# Patient Record
Sex: FEMALE | Race: WHITE | NOT HISPANIC OR LATINO | Employment: OTHER | URBAN - METROPOLITAN AREA
[De-identification: names, ages, dates, MRNs, and addresses within clinical notes are randomized per-mention and may not be internally consistent; named-entity substitution may affect disease eponyms.]

---

## 2020-03-14 ENCOUNTER — NURSE TRIAGE (OUTPATIENT)
Dept: ADMINISTRATIVE | Facility: CLINIC | Age: 40
End: 2020-03-14

## 2020-03-14 NOTE — TELEPHONE ENCOUNTER
Patient's fiance reports throat itching and burning, cough and test tightness and a fever. Patient's fiance wasn't present with the patient and was advised to have th patient with her when calling and also was advised to download anywhere care since the patient's fiance is concerned about patient having COVID-19. The patient's fiance verbalized understanding and was advised to call back with questions, concerns or worsening symptoms. The patient's fiance verbalized understanding.     Reason for Disposition   Question about upcoming scheduled test, no triage required and triager able to answer question    Additional Information   Negative: [1] Caller is not with the adult (patient) AND [2] reporting urgent symptoms   Negative: Lab result questions   Negative: Medication questions   Negative: Caller can't be reached by phone   Negative: Caller has already spoken to PCP or another triager   Negative: RN needs further essential information from caller in order to complete triage   Negative: Requesting regular office appointment   Negative: [1] Caller requesting NON-URGENT health information AND [2] PCP's office is the best resource   Negative: Health Information question, no triage required and triager able to answer question   Negative: General information question, no triage required and triager able to answer question    Protocols used: INFORMATION ONLY CALL-A-

## 2020-04-29 PROBLEM — Z12.11 SCREENING FOR COLORECTAL CANCER: Status: ACTIVE | Noted: 2020-04-29

## 2020-04-29 PROBLEM — Z12.12 SCREENING FOR COLORECTAL CANCER: Status: ACTIVE | Noted: 2020-04-29

## 2020-05-12 ENCOUNTER — NURSE TRIAGE (OUTPATIENT)
Dept: ADMINISTRATIVE | Facility: CLINIC | Age: 40
End: 2020-05-12

## 2020-05-12 NOTE — TELEPHONE ENCOUNTER
Asymptomatic per pt      Reason for Disposition   General information question, no triage required and triager able to answer question    Additional Information   Negative: [1] Caller is not with the adult (patient) AND [2] reporting urgent symptoms   Negative: Lab result questions   Negative: Medication questions   Negative: Caller can't be reached by phone   Negative: Caller has already spoken to PCP or another triager   Negative: RN needs further essential information from caller in order to complete triage   Negative: Requesting regular office appointment   Negative: [1] Caller requesting NON-URGENT health information AND [2] PCP's office is the best resource   Negative: Health Information question, no triage required and triager able to answer question    Protocols used: INFORMATION ONLY CALL-A-

## 2020-07-09 ENCOUNTER — HISTORICAL (OUTPATIENT)
Dept: ADMINISTRATIVE | Facility: HOSPITAL | Age: 40
End: 2020-07-09

## 2020-07-09 LAB
ALBUMIN SERPL BCP-MCNC: 3.9 G/DL (ref 3.5–5)
ALBUMIN/GLOB SERPL ELPH: 1.1 {RATIO} (ref 1.5–2.2)
ALP SERPL-CCNC: 69 U/L (ref 45–117)
ALT SERPL W P-5'-P-CCNC: 71 U/L (ref 13–56)
ANION GAP SERPL CALC-SCNC: 4.7 MEQ/L (ref 10–20)
AST SERPL-CCNC: 86 U/L (ref 15–37)
BASOPHILS NFR BLD: 0 10 (ref 0–0.1)
BASOPHILS NFR BLD: 0.4 % (ref 0–1.5)
BILIRUB SERPL-MCNC: 0.41 MG/DL (ref 0.2–1)
BUN SERPL-MCNC: 7 MG/DL (ref 7–18)
CALCIUM SERPL-MCNC: 9 MG/DL (ref 8.5–10.1)
CHLORIDE SERPL-SCNC: 108 MMOL/L (ref 98–107)
CO2 SERPL-SCNC: 29 MMOL/L (ref 22–32)
CREAT SERPL-MCNC: 1.09 MG/DL (ref 0.55–1.02)
EGFR: 59 ML/MIN/1.73M
EOSINOPHIL NFR BLD: 0.2 10 (ref 0–0.7)
EOSINOPHIL NFR BLD: 2.5 % (ref 0–7)
ERYTHROCYTE [DISTWIDTH] IN BLOOD BY AUTOMATED COUNT: 14.3 % (ref 11.5–14.5)
GLOBULIN: 3.7 G/DL (ref 2.3–3.5)
GLUCOSE SERPL-MCNC: 100 MG/DL (ref 70–99)
GRAN #: 4.16 10 (ref 2–7.5)
GRAN%: 0.3 %
GRAN%: 61.7 % (ref 50–80)
HCT VFR BLD AUTO: 42.3 % (ref 37.7–47.9)
HGB BLD-MCNC: 14.9 G/DL (ref 12.2–16.2)
IMMATURE GRANULOCYTES #: 0.02 10
IPF: 4.8
LIPASE SERPL-CCNC: 48 U/L (ref 73–393)
LYMPH #: 1.8 10 (ref 1–3.5)
LYMPH%: 26.9 % (ref 12–50)
MCH RBC QN AUTO: 34 PG (ref 27–31)
MCHC RBC AUTO-ENTMCNC: 35.2 G% (ref 32–35)
MCV RBC AUTO: 96.6 FL (ref 80–97)
MONO #: 0.6 10 (ref 0–0.8)
MONO%: 8.2 % (ref 0–12)
OSMOC: 274 MOSM/KG (ref 275–295)
PMV BLD AUTO: 269 10 (ref 142–424)
PMV BLD AUTO: 9.1 FL (ref 7.4–10.4)
POTASSIUM SERPL-SCNC: 3.7 MMOL/L (ref 3.5–5.1)
PROT SERPL-MCNC: 7.6 G/DL (ref 6.4–8.2)
RBC # BLD AUTO: 4.38 M/UL (ref 4.04–5.48)
SODIUM BLD-SCNC: 138 MMOL/L (ref 136–145)
WBC # BLD AUTO: 6.7 10 (ref 4–10.2)

## 2020-07-13 PROBLEM — K50.90 EXACERBATION OF CROHN'S DISEASE WITHOUT COMPLICATION: Status: ACTIVE | Noted: 2020-07-13

## 2020-07-13 PROBLEM — F32.A ANXIETY AND DEPRESSION: Status: ACTIVE | Noted: 2020-07-13

## 2020-07-13 PROBLEM — E78.5 HLD (HYPERLIPIDEMIA): Status: ACTIVE | Noted: 2020-07-13

## 2020-07-13 PROBLEM — K50.90 CROHN DISEASE: Status: ACTIVE | Noted: 2020-07-13

## 2020-07-13 PROBLEM — F41.9 ANXIETY AND DEPRESSION: Status: ACTIVE | Noted: 2020-07-13

## 2020-07-14 PROBLEM — N17.9 AKI (ACUTE KIDNEY INJURY): Status: ACTIVE | Noted: 2020-07-14

## 2020-07-15 PROBLEM — R63.8 INCREASED NUTRITIONAL NEEDS: Status: ACTIVE | Noted: 2020-07-15

## 2020-10-07 LAB
HPV APTIMA: POSITIVE
HPV GENOTYPE 16: NEGATIVE
HPV GENOTYPE 18,45: POSITIVE
PAP RECOMMENDATION EXT: NORMAL

## 2021-01-07 ENCOUNTER — HOSPITAL ENCOUNTER (EMERGENCY)
Facility: HOSPITAL | Age: 41
Discharge: HOME | End: 2021-01-07
Attending: EMERGENCY MEDICINE
Payer: MEDICAID

## 2021-01-07 VITALS
DIASTOLIC BLOOD PRESSURE: 77 MMHG | BODY MASS INDEX: 37.95 KG/M2 | HEART RATE: 80 BPM | HEIGHT: 60 IN | OXYGEN SATURATION: 99 % | TEMPERATURE: 98 F | WEIGHT: 193.31 LBS | SYSTOLIC BLOOD PRESSURE: 133 MMHG | RESPIRATION RATE: 14 BRPM

## 2021-01-07 DIAGNOSIS — K04.7 DENTAL ABSCESS: Primary | ICD-10-CM

## 2021-01-07 PROCEDURE — 99284 EMERGENCY DEPT VISIT MOD MDM: CPT

## 2021-01-07 PROCEDURE — 25000003 PHARM REV CODE 250: Performed by: EMERGENCY MEDICINE

## 2021-01-07 RX ORDER — HYDROCODONE BITARTRATE AND ACETAMINOPHEN 7.5; 325 MG/15ML; MG/15ML
5 SOLUTION ORAL
Status: COMPLETED | OUTPATIENT
Start: 2021-01-07 | End: 2021-01-07

## 2021-01-07 RX ORDER — AMOXICILLIN AND CLAVULANATE POTASSIUM 400; 57 MG/5ML; MG/5ML
800 POWDER, FOR SUSPENSION ORAL
Status: COMPLETED | OUTPATIENT
Start: 2021-01-07 | End: 2021-01-07

## 2021-01-07 RX ORDER — AMOXICILLIN AND CLAVULANATE POTASSIUM 400; 57 MG/5ML; MG/5ML
800 POWDER, FOR SUSPENSION ORAL 2 TIMES DAILY
Qty: 200 ML | Refills: 0 | Status: SHIPPED | OUTPATIENT
Start: 2021-01-07 | End: 2021-01-17

## 2021-01-07 RX ORDER — TRAMADOL HYDROCHLORIDE 50 MG/1
50 TABLET ORAL EVERY 6 HOURS PRN
Qty: 12 TABLET | Refills: 0 | Status: SHIPPED | OUTPATIENT
Start: 2021-01-07 | End: 2023-02-16

## 2021-01-07 RX ADMIN — HYDROCODONE BITARTRATE AND ACETAMINOPHEN 5 ML: 7.5; 325 SOLUTION ORAL at 09:01

## 2021-01-07 RX ADMIN — AMOXICILLIN AND CLAVULANATE POTASSIUM 800 MG: 400; 57 POWDER, FOR SUSPENSION ORAL at 09:01

## 2021-04-13 DIAGNOSIS — Z12.31 ENCOUNTER FOR SCREENING MAMMOGRAM FOR MALIGNANT NEOPLASM OF BREAST: Primary | ICD-10-CM

## 2021-05-13 ENCOUNTER — HOSPITAL ENCOUNTER (OUTPATIENT)
Dept: RADIOLOGY | Facility: HOSPITAL | Age: 41
Discharge: HOME | End: 2021-05-13
Attending: NURSE PRACTITIONER
Payer: MEDICAID

## 2021-05-13 VITALS — WEIGHT: 206 LBS | HEIGHT: 63 IN | BODY MASS INDEX: 36.5 KG/M2

## 2021-05-13 DIAGNOSIS — Z12.31 ENCOUNTER FOR SCREENING MAMMOGRAM FOR MALIGNANT NEOPLASM OF BREAST: ICD-10-CM

## 2021-05-13 PROCEDURE — 77067 SCR MAMMO BI INCL CAD: CPT | Mod: TC

## 2021-08-08 ENCOUNTER — HOSPITAL ENCOUNTER (EMERGENCY)
Facility: HOSPITAL | Age: 41
Discharge: HOME | End: 2021-08-08
Attending: EMERGENCY MEDICINE
Payer: MEDICAID

## 2021-08-08 VITALS
TEMPERATURE: 98 F | SYSTOLIC BLOOD PRESSURE: 120 MMHG | HEIGHT: 63 IN | RESPIRATION RATE: 16 BRPM | OXYGEN SATURATION: 98 % | HEART RATE: 91 BPM | WEIGHT: 204 LBS | DIASTOLIC BLOOD PRESSURE: 75 MMHG | BODY MASS INDEX: 36.14 KG/M2

## 2021-08-08 DIAGNOSIS — U07.1 COVID-19: Primary | ICD-10-CM

## 2021-08-08 LAB
CTP QC/QA: YES
SARS-COV-2 RDRP RESP QL NAA+PROBE: POSITIVE

## 2021-08-08 PROCEDURE — U0002 COVID-19 LAB TEST NON-CDC: HCPCS | Performed by: EMERGENCY MEDICINE

## 2021-08-08 PROCEDURE — 63700000 PHARM REV CODE 250 ALT 637 W/O HCPCS: Performed by: EMERGENCY MEDICINE

## 2021-08-08 PROCEDURE — 63600175 PHARM REV CODE 636 W HCPCS: Performed by: EMERGENCY MEDICINE

## 2021-08-08 PROCEDURE — 99284 EMERGENCY DEPT VISIT MOD MDM: CPT

## 2021-08-08 RX ORDER — PREDNISONE 10 MG/1
10 TABLET ORAL DAILY
Qty: 5 TABLET | Refills: 0 | Status: SHIPPED | OUTPATIENT
Start: 2021-08-08 | End: 2021-08-13

## 2021-08-08 RX ORDER — AZITHROMYCIN 500 MG/1
500 TABLET, FILM COATED ORAL DAILY
Qty: 4 TABLET | Refills: 0 | Status: SHIPPED | OUTPATIENT
Start: 2021-08-08 | End: 2021-08-12

## 2021-08-08 RX ORDER — ALBUTEROL SULFATE 90 UG/1
2 AEROSOL, METERED RESPIRATORY (INHALATION) EVERY 6 HOURS PRN
Qty: 6.7 G | Refills: 0 | Status: SHIPPED | OUTPATIENT
Start: 2021-08-08 | End: 2023-02-16

## 2021-08-08 RX ORDER — PREDNISONE 20 MG/1
40 TABLET ORAL
Status: COMPLETED | OUTPATIENT
Start: 2021-08-08 | End: 2021-08-08

## 2021-08-08 RX ORDER — AZITHROMYCIN 250 MG/1
500 TABLET, FILM COATED ORAL
Status: COMPLETED | OUTPATIENT
Start: 2021-08-08 | End: 2021-08-08

## 2021-08-08 RX ADMIN — PREDNISONE 40 MG: 20 TABLET ORAL at 11:08

## 2021-08-08 RX ADMIN — AZITHROMYCIN 500 MG: 250 TABLET, FILM COATED ORAL at 11:08

## 2021-08-09 DIAGNOSIS — U07.1 COVID-19 VIRUS DETECTED: ICD-10-CM

## 2021-08-19 ENCOUNTER — HOSPITAL ENCOUNTER (INPATIENT)
Facility: HOSPITAL | Age: 41
LOS: 6 days | Discharge: HOME | DRG: 177 | End: 2021-08-25
Attending: EMERGENCY MEDICINE | Admitting: INTERNAL MEDICINE
Payer: MEDICAID

## 2021-08-19 DIAGNOSIS — E87.6 HYPOKALEMIA: ICD-10-CM

## 2021-08-19 DIAGNOSIS — J12.82 PNEUMONIA DUE TO COVID-19 VIRUS: ICD-10-CM

## 2021-08-19 DIAGNOSIS — K52.9 GASTROENTERITIS: ICD-10-CM

## 2021-08-19 DIAGNOSIS — N17.9 ACUTE KIDNEY INJURY: ICD-10-CM

## 2021-08-19 DIAGNOSIS — U07.1 COVID-19 VIRUS INFECTION: Primary | ICD-10-CM

## 2021-08-19 DIAGNOSIS — R06.00 DYSPNEA: ICD-10-CM

## 2021-08-19 DIAGNOSIS — U07.1 PNEUMONIA DUE TO COVID-19 VIRUS: ICD-10-CM

## 2021-08-19 LAB
ALBUMIN SERPL BCP-MCNC: 3.5 G/DL (ref 3.5–5.2)
ALP SERPL-CCNC: 77 U/L (ref 55–135)
ALT SERPL W/O P-5'-P-CCNC: 53 U/L (ref 10–44)
ANION GAP SERPL CALC-SCNC: 17 MMOL/L (ref 8–16)
AST SERPL-CCNC: 113 U/L (ref 10–40)
BASOPHILS # BLD AUTO: ABNORMAL K/UL (ref 0–0.2)
BASOPHILS NFR BLD: 0 % (ref 0–1.9)
BILIRUB SERPL-MCNC: 0.4 MG/DL (ref 0.1–1)
BUN SERPL-MCNC: 39 MG/DL (ref 6–20)
CALCIUM SERPL-MCNC: 8.6 MG/DL (ref 8.7–10.5)
CHLORIDE SERPL-SCNC: 96 MMOL/L (ref 95–110)
CO2 SERPL-SCNC: 22 MMOL/L (ref 23–29)
CREAT SERPL-MCNC: 1.7 MG/DL (ref 0.5–1.4)
DIFFERENTIAL METHOD: ABNORMAL
EOSINOPHIL # BLD AUTO: ABNORMAL K/UL (ref 0–0.5)
EOSINOPHIL NFR BLD: 0 % (ref 0–8)
ERYTHROCYTE [DISTWIDTH] IN BLOOD BY AUTOMATED COUNT: 13.2 % (ref 11.5–14.5)
EST. GFR  (AFRICAN AMERICAN): 42.3 ML/MIN/1.73 M^2
EST. GFR  (NON AFRICAN AMERICAN): 36.7 ML/MIN/1.73 M^2
GLUCOSE SERPL-MCNC: 128 MG/DL (ref 70–110)
HCT VFR BLD AUTO: 39.3 % (ref 37–48.5)
HGB BLD-MCNC: 14.7 G/DL (ref 12–16)
IMM GRANULOCYTES # BLD AUTO: ABNORMAL K/UL (ref 0–0.04)
IMM GRANULOCYTES NFR BLD AUTO: ABNORMAL % (ref 0–0.5)
LACTATE SERPL-SCNC: 2.1 MMOL/L (ref 0.5–2.2)
LYMPHOCYTES # BLD AUTO: ABNORMAL K/UL (ref 1–4.8)
LYMPHOCYTES NFR BLD: 24 % (ref 18–48)
MAGNESIUM SERPL-MCNC: 2.1 MG/DL (ref 1.6–2.6)
MCH RBC QN AUTO: 38 PG (ref 27–31)
MCHC RBC AUTO-ENTMCNC: 37.4 G/DL (ref 32–36)
MCV RBC AUTO: 102 FL (ref 82–98)
MONOCYTES # BLD AUTO: ABNORMAL K/UL (ref 0.3–1)
MONOCYTES NFR BLD: 5 % (ref 4–15)
NEUTROPHILS NFR BLD: 67 % (ref 38–73)
NEUTS BAND NFR BLD MANUAL: 4 %
NRBC BLD-RTO: 0 /100 WBC
PLATELET # BLD AUTO: 116 K/UL (ref 150–450)
PLATELET BLD QL SMEAR: ABNORMAL
PMV BLD AUTO: 10.6 FL (ref 9.2–12.9)
POTASSIUM SERPL-SCNC: 2.6 MMOL/L (ref 3.5–5.1)
PROT SERPL-MCNC: 8.3 G/DL (ref 6–8.4)
RBC # BLD AUTO: 3.87 M/UL (ref 4–5.4)
SODIUM SERPL-SCNC: 135 MMOL/L (ref 136–145)
WBC # BLD AUTO: 3.17 K/UL (ref 3.9–12.7)

## 2021-08-19 PROCEDURE — 83735 ASSAY OF MAGNESIUM: CPT | Performed by: EMERGENCY MEDICINE

## 2021-08-19 PROCEDURE — 25000242 PHARM REV CODE 250 ALT 637 W/ HCPCS: Performed by: EMERGENCY MEDICINE

## 2021-08-19 PROCEDURE — 63600175 PHARM REV CODE 636 W HCPCS: Performed by: EMERGENCY MEDICINE

## 2021-08-19 PROCEDURE — 25000003 PHARM REV CODE 250: Performed by: EMERGENCY MEDICINE

## 2021-08-19 PROCEDURE — 85027 COMPLETE CBC AUTOMATED: CPT | Performed by: EMERGENCY MEDICINE

## 2021-08-19 PROCEDURE — 99900035 HC TECH TIME PER 15 MIN (STAT)

## 2021-08-19 PROCEDURE — 99285 EMERGENCY DEPT VISIT HI MDM: CPT | Mod: 25

## 2021-08-19 PROCEDURE — 96361 HYDRATE IV INFUSION ADD-ON: CPT

## 2021-08-19 PROCEDURE — 96374 THER/PROPH/DIAG INJ IV PUSH: CPT

## 2021-08-19 PROCEDURE — 94761 N-INVAS EAR/PLS OXIMETRY MLT: CPT

## 2021-08-19 PROCEDURE — 85007 BL SMEAR W/DIFF WBC COUNT: CPT | Performed by: EMERGENCY MEDICINE

## 2021-08-19 PROCEDURE — 87040 BLOOD CULTURE FOR BACTERIA: CPT | Mod: 59 | Performed by: EMERGENCY MEDICINE

## 2021-08-19 PROCEDURE — 83605 ASSAY OF LACTIC ACID: CPT | Performed by: EMERGENCY MEDICINE

## 2021-08-19 PROCEDURE — 27000207 HC ISOLATION

## 2021-08-19 PROCEDURE — 94640 AIRWAY INHALATION TREATMENT: CPT

## 2021-08-19 PROCEDURE — 11000001 HC ACUTE MED/SURG PRIVATE ROOM

## 2021-08-19 PROCEDURE — 80053 COMPREHEN METABOLIC PANEL: CPT | Performed by: EMERGENCY MEDICINE

## 2021-08-19 RX ORDER — POTASSIUM CHLORIDE 20 MEQ/1
40 TABLET, EXTENDED RELEASE ORAL
Status: COMPLETED | OUTPATIENT
Start: 2021-08-19 | End: 2021-08-19

## 2021-08-19 RX ORDER — ACETAMINOPHEN 325 MG/1
650 TABLET ORAL EVERY 8 HOURS PRN
Status: DISCONTINUED | OUTPATIENT
Start: 2021-08-19 | End: 2021-08-25 | Stop reason: HOSPADM

## 2021-08-19 RX ORDER — SODIUM CHLORIDE 0.9 % (FLUSH) 0.9 %
10 SYRINGE (ML) INJECTION
Status: DISCONTINUED | OUTPATIENT
Start: 2021-08-19 | End: 2021-08-25 | Stop reason: HOSPADM

## 2021-08-19 RX ORDER — ONDANSETRON 2 MG/ML
4 INJECTION INTRAMUSCULAR; INTRAVENOUS EVERY 8 HOURS PRN
Status: DISCONTINUED | OUTPATIENT
Start: 2021-08-19 | End: 2021-08-25 | Stop reason: HOSPADM

## 2021-08-19 RX ORDER — DEXAMETHASONE SODIUM PHOSPHATE 4 MG/ML
6 INJECTION, SOLUTION INTRA-ARTICULAR; INTRALESIONAL; INTRAMUSCULAR; INTRAVENOUS; SOFT TISSUE EVERY 24 HOURS
Status: DISCONTINUED | OUTPATIENT
Start: 2021-08-19 | End: 2021-08-25 | Stop reason: HOSPADM

## 2021-08-19 RX ORDER — ONDANSETRON 2 MG/ML
4 INJECTION INTRAMUSCULAR; INTRAVENOUS
Status: COMPLETED | OUTPATIENT
Start: 2021-08-19 | End: 2021-08-19

## 2021-08-19 RX ORDER — TALC
6 POWDER (GRAM) TOPICAL NIGHTLY PRN
Status: DISCONTINUED | OUTPATIENT
Start: 2021-08-19 | End: 2021-08-25 | Stop reason: HOSPADM

## 2021-08-19 RX ORDER — IPRATROPIUM BROMIDE AND ALBUTEROL SULFATE 2.5; .5 MG/3ML; MG/3ML
3 SOLUTION RESPIRATORY (INHALATION) EVERY 6 HOURS PRN
Status: DISCONTINUED | OUTPATIENT
Start: 2021-08-19 | End: 2021-08-25 | Stop reason: HOSPADM

## 2021-08-19 RX ORDER — IBUPROFEN 600 MG/1
600 TABLET ORAL
Status: COMPLETED | OUTPATIENT
Start: 2021-08-19 | End: 2021-08-19

## 2021-08-19 RX ORDER — ACETAMINOPHEN 325 MG/1
650 TABLET ORAL
Status: COMPLETED | OUTPATIENT
Start: 2021-08-19 | End: 2021-08-19

## 2021-08-19 RX ORDER — SODIUM CHLORIDE AND POTASSIUM CHLORIDE 150; 900 MG/100ML; MG/100ML
INJECTION, SOLUTION INTRAVENOUS CONTINUOUS
Status: DISCONTINUED | OUTPATIENT
Start: 2021-08-19 | End: 2021-08-22

## 2021-08-19 RX ORDER — IPRATROPIUM BROMIDE AND ALBUTEROL SULFATE 2.5; .5 MG/3ML; MG/3ML
3 SOLUTION RESPIRATORY (INHALATION)
Status: COMPLETED | OUTPATIENT
Start: 2021-08-19 | End: 2021-08-19

## 2021-08-19 RX ADMIN — ONDANSETRON 4 MG: 2 INJECTION INTRAMUSCULAR; INTRAVENOUS at 08:08

## 2021-08-19 RX ADMIN — IPRATROPIUM BROMIDE AND ALBUTEROL SULFATE 3 ML: .5; 3 SOLUTION RESPIRATORY (INHALATION) at 07:08

## 2021-08-19 RX ADMIN — ACETAMINOPHEN 650 MG: 325 TABLET ORAL at 08:08

## 2021-08-19 RX ADMIN — IBUPROFEN 600 MG: 600 TABLET, FILM COATED ORAL at 09:08

## 2021-08-19 RX ADMIN — SODIUM CHLORIDE 1000 ML: 0.9 INJECTION, SOLUTION INTRAVENOUS at 07:08

## 2021-08-19 RX ADMIN — POTASSIUM CHLORIDE 40 MEQ: 1500 TABLET, EXTENDED RELEASE ORAL at 09:08

## 2021-08-20 PROBLEM — E87.6 HYPOKALEMIA: Status: ACTIVE | Noted: 2021-08-20

## 2021-08-20 PROBLEM — J12.82 PNEUMONIA DUE TO COVID-19 VIRUS: Status: ACTIVE | Noted: 2021-08-19

## 2021-08-20 PROBLEM — R79.89 ELEVATED LFTS: Status: ACTIVE | Noted: 2021-08-20

## 2021-08-20 LAB
ALBUMIN SERPL BCP-MCNC: 3 G/DL (ref 3.5–5.2)
ALP SERPL-CCNC: 68 U/L (ref 55–135)
ALT SERPL W/O P-5'-P-CCNC: 47 U/L (ref 10–44)
ANION GAP SERPL CALC-SCNC: 9 MMOL/L (ref 8–16)
AST SERPL-CCNC: 106 U/L (ref 10–40)
BASOPHILS # BLD AUTO: ABNORMAL K/UL (ref 0–0.2)
BASOPHILS NFR BLD: 0 % (ref 0–1.9)
BILIRUB SERPL-MCNC: 0.4 MG/DL (ref 0.1–1)
BUN SERPL-MCNC: 36 MG/DL (ref 6–20)
CALCIUM SERPL-MCNC: 8.1 MG/DL (ref 8.7–10.5)
CHLORIDE SERPL-SCNC: 106 MMOL/L (ref 95–110)
CO2 SERPL-SCNC: 22 MMOL/L (ref 23–29)
CREAT SERPL-MCNC: 1.3 MG/DL (ref 0.5–1.4)
DIFFERENTIAL METHOD: ABNORMAL
EOSINOPHIL # BLD AUTO: ABNORMAL K/UL (ref 0–0.5)
EOSINOPHIL NFR BLD: 0 % (ref 0–8)
ERYTHROCYTE [DISTWIDTH] IN BLOOD BY AUTOMATED COUNT: 13.3 % (ref 11.5–14.5)
EST. GFR  (AFRICAN AMERICAN): 58.5 ML/MIN/1.73 M^2
EST. GFR  (NON AFRICAN AMERICAN): 50.7 ML/MIN/1.73 M^2
GLUCOSE SERPL-MCNC: 191 MG/DL (ref 70–110)
HCT VFR BLD AUTO: 36.2 % (ref 37–48.5)
HGB BLD-MCNC: 13.4 G/DL (ref 12–16)
IMM GRANULOCYTES # BLD AUTO: ABNORMAL K/UL (ref 0–0.04)
IMM GRANULOCYTES NFR BLD AUTO: ABNORMAL % (ref 0–0.5)
LYMPHOCYTES # BLD AUTO: ABNORMAL K/UL (ref 1–4.8)
LYMPHOCYTES NFR BLD: 16 % (ref 18–48)
MCH RBC QN AUTO: 38.3 PG (ref 27–31)
MCHC RBC AUTO-ENTMCNC: 37 G/DL (ref 32–36)
MCV RBC AUTO: 103 FL (ref 82–98)
MONOCYTES # BLD AUTO: ABNORMAL K/UL (ref 0.3–1)
MONOCYTES NFR BLD: 2 % (ref 4–15)
NEUTROPHILS NFR BLD: 78 % (ref 38–73)
NEUTS BAND NFR BLD MANUAL: 4 %
NRBC BLD-RTO: 0 /100 WBC
PLATELET # BLD AUTO: 101 K/UL (ref 150–450)
PMV BLD AUTO: 11.1 FL (ref 9.2–12.9)
POTASSIUM SERPL-SCNC: 3.3 MMOL/L (ref 3.5–5.1)
PROT SERPL-MCNC: 7.3 G/DL (ref 6–8.4)
RBC # BLD AUTO: 3.5 M/UL (ref 4–5.4)
SODIUM SERPL-SCNC: 137 MMOL/L (ref 136–145)
WBC # BLD AUTO: 3.75 K/UL (ref 3.9–12.7)

## 2021-08-20 PROCEDURE — 80053 COMPREHEN METABOLIC PANEL: CPT | Performed by: EMERGENCY MEDICINE

## 2021-08-20 PROCEDURE — 63600175 PHARM REV CODE 636 W HCPCS: Performed by: EMERGENCY MEDICINE

## 2021-08-20 PROCEDURE — 85027 COMPLETE CBC AUTOMATED: CPT | Performed by: EMERGENCY MEDICINE

## 2021-08-20 PROCEDURE — 63600175 PHARM REV CODE 636 W HCPCS: Performed by: INTERNAL MEDICINE

## 2021-08-20 PROCEDURE — 94761 N-INVAS EAR/PLS OXIMETRY MLT: CPT

## 2021-08-20 PROCEDURE — 36415 COLL VENOUS BLD VENIPUNCTURE: CPT | Performed by: EMERGENCY MEDICINE

## 2021-08-20 PROCEDURE — 99900035 HC TECH TIME PER 15 MIN (STAT)

## 2021-08-20 PROCEDURE — 85007 BL SMEAR W/DIFF WBC COUNT: CPT | Performed by: EMERGENCY MEDICINE

## 2021-08-20 PROCEDURE — 11000001 HC ACUTE MED/SURG PRIVATE ROOM

## 2021-08-20 PROCEDURE — 27000207 HC ISOLATION

## 2021-08-20 PROCEDURE — 25000003 PHARM REV CODE 250: Performed by: EMERGENCY MEDICINE

## 2021-08-20 PROCEDURE — 27000221 HC OXYGEN, UP TO 24 HOURS

## 2021-08-20 PROCEDURE — 25000003 PHARM REV CODE 250: Performed by: INTERNAL MEDICINE

## 2021-08-20 RX ORDER — POTASSIUM CHLORIDE 20 MEQ/1
20 TABLET, EXTENDED RELEASE ORAL 2 TIMES DAILY
Status: DISCONTINUED | OUTPATIENT
Start: 2021-08-20 | End: 2021-08-25 | Stop reason: HOSPADM

## 2021-08-20 RX ORDER — LACTULOSE 10 G/15ML
10 SOLUTION ORAL
Status: DISCONTINUED | OUTPATIENT
Start: 2021-08-20 | End: 2021-08-20

## 2021-08-20 RX ORDER — PSEUDOEPHEDRINE/ACETAMINOPHEN 30MG-500MG
100 TABLET ORAL
Status: DISCONTINUED | OUTPATIENT
Start: 2021-08-20 | End: 2021-08-20

## 2021-08-20 RX ORDER — ENOXAPARIN SODIUM 100 MG/ML
40 INJECTION SUBCUTANEOUS EVERY 24 HOURS
Status: DISCONTINUED | OUTPATIENT
Start: 2021-08-20 | End: 2021-08-25 | Stop reason: HOSPADM

## 2021-08-20 RX ORDER — SYRING-NEEDL,DISP,INSUL,0.3 ML 29 G X1/2"
296 SYRINGE, EMPTY DISPOSABLE MISCELLANEOUS
Status: DISCONTINUED | OUTPATIENT
Start: 2021-08-20 | End: 2021-08-20

## 2021-08-20 RX ORDER — LOPERAMIDE HYDROCHLORIDE 2 MG/1
4 CAPSULE ORAL
Status: COMPLETED | OUTPATIENT
Start: 2021-08-20 | End: 2021-08-20

## 2021-08-20 RX ADMIN — AZITHROMYCIN MONOHYDRATE 500 MG: 500 INJECTION, POWDER, LYOPHILIZED, FOR SOLUTION INTRAVENOUS at 12:08

## 2021-08-20 RX ADMIN — SODIUM CHLORIDE AND POTASSIUM CHLORIDE: 9; 1.49 INJECTION, SOLUTION INTRAVENOUS at 12:08

## 2021-08-20 RX ADMIN — LOPERAMIDE HYDROCHLORIDE 4 MG: 2 CAPSULE ORAL at 03:08

## 2021-08-20 RX ADMIN — REMDESIVIR 200 MG: 100 INJECTION, POWDER, LYOPHILIZED, FOR SOLUTION INTRAVENOUS at 09:08

## 2021-08-20 RX ADMIN — CEFTRIAXONE SODIUM 1 G: 1 INJECTION, POWDER, FOR SOLUTION INTRAMUSCULAR; INTRAVENOUS at 10:08

## 2021-08-20 RX ADMIN — SODIUM CHLORIDE AND POTASSIUM CHLORIDE: 9; 1.49 INJECTION, SOLUTION INTRAVENOUS at 09:08

## 2021-08-20 RX ADMIN — POTASSIUM CHLORIDE 20 MEQ: 1500 TABLET, EXTENDED RELEASE ORAL at 08:08

## 2021-08-20 RX ADMIN — POTASSIUM CHLORIDE 20 MEQ: 1500 TABLET, EXTENDED RELEASE ORAL at 09:08

## 2021-08-20 RX ADMIN — CEFTRIAXONE SODIUM 1 G: 1 INJECTION, POWDER, FOR SOLUTION INTRAMUSCULAR; INTRAVENOUS at 12:08

## 2021-08-20 RX ADMIN — DEXAMETHASONE SODIUM PHOSPHATE 6 MG: 4 INJECTION, SOLUTION INTRA-ARTICULAR; INTRALESIONAL; INTRAMUSCULAR; INTRAVENOUS; SOFT TISSUE at 12:08

## 2021-08-20 RX ADMIN — AZITHROMYCIN MONOHYDRATE 500 MG: 500 INJECTION, POWDER, LYOPHILIZED, FOR SOLUTION INTRAVENOUS at 11:08

## 2021-08-20 RX ADMIN — ENOXAPARIN SODIUM 40 MG: 40 INJECTION SUBCUTANEOUS at 09:08

## 2021-08-20 RX ADMIN — ONDANSETRON 4 MG: 2 INJECTION INTRAMUSCULAR; INTRAVENOUS at 08:08

## 2021-08-20 RX ADMIN — ONDANSETRON 4 MG: 2 INJECTION INTRAMUSCULAR; INTRAVENOUS at 10:08

## 2021-08-21 LAB
ALBUMIN SERPL BCP-MCNC: 2.5 G/DL (ref 3.5–5.2)
ALP SERPL-CCNC: 57 U/L (ref 55–135)
ALT SERPL W/O P-5'-P-CCNC: 41 U/L (ref 10–44)
ANION GAP SERPL CALC-SCNC: 9 MMOL/L (ref 8–16)
AST SERPL-CCNC: 70 U/L (ref 10–40)
BASOPHILS # BLD AUTO: ABNORMAL K/UL (ref 0–0.2)
BASOPHILS NFR BLD: 0 % (ref 0–1.9)
BILIRUB SERPL-MCNC: 0.3 MG/DL (ref 0.1–1)
BUN SERPL-MCNC: 17 MG/DL (ref 6–20)
CALCIUM SERPL-MCNC: 8.2 MG/DL (ref 8.7–10.5)
CHLORIDE SERPL-SCNC: 118 MMOL/L (ref 95–110)
CO2 SERPL-SCNC: 18 MMOL/L (ref 23–29)
CREAT SERPL-MCNC: 0.7 MG/DL (ref 0.5–1.4)
DIFFERENTIAL METHOD: ABNORMAL
EOSINOPHIL # BLD AUTO: ABNORMAL K/UL (ref 0–0.5)
EOSINOPHIL NFR BLD: 1 % (ref 0–8)
ERYTHROCYTE [DISTWIDTH] IN BLOOD BY AUTOMATED COUNT: 13.4 % (ref 11.5–14.5)
EST. GFR  (AFRICAN AMERICAN): >60 ML/MIN/1.73 M^2
EST. GFR  (NON AFRICAN AMERICAN): >60 ML/MIN/1.73 M^2
GLUCOSE SERPL-MCNC: 86 MG/DL (ref 70–110)
HCT VFR BLD AUTO: 29.6 % (ref 37–48.5)
HGB BLD-MCNC: 10.8 G/DL (ref 12–16)
IMM GRANULOCYTES # BLD AUTO: ABNORMAL K/UL (ref 0–0.04)
IMM GRANULOCYTES NFR BLD AUTO: ABNORMAL % (ref 0–0.5)
LYMPHOCYTES # BLD AUTO: ABNORMAL K/UL (ref 1–4.8)
LYMPHOCYTES NFR BLD: 21 % (ref 18–48)
MAGNESIUM SERPL-MCNC: 2.2 MG/DL (ref 1.6–2.6)
MCH RBC QN AUTO: 37.9 PG (ref 27–31)
MCHC RBC AUTO-ENTMCNC: 36.5 G/DL (ref 32–36)
MCV RBC AUTO: 104 FL (ref 82–98)
MONOCYTES # BLD AUTO: ABNORMAL K/UL (ref 0.3–1)
MONOCYTES NFR BLD: 3 % (ref 4–15)
NEUTROPHILS NFR BLD: 69 % (ref 38–73)
NEUTS BAND NFR BLD MANUAL: 6 %
NRBC BLD-RTO: 0 /100 WBC
PLATELET # BLD AUTO: 118 K/UL (ref 150–450)
PMV BLD AUTO: 11.4 FL (ref 9.2–12.9)
POTASSIUM SERPL-SCNC: 3.4 MMOL/L (ref 3.5–5.1)
PROT SERPL-MCNC: 6.1 G/DL (ref 6–8.4)
RBC # BLD AUTO: 2.85 M/UL (ref 4–5.4)
SODIUM SERPL-SCNC: 145 MMOL/L (ref 136–145)
WBC # BLD AUTO: 4.97 K/UL (ref 3.9–12.7)

## 2021-08-21 PROCEDURE — 11000001 HC ACUTE MED/SURG PRIVATE ROOM

## 2021-08-21 PROCEDURE — 27000221 HC OXYGEN, UP TO 24 HOURS

## 2021-08-21 PROCEDURE — 25000003 PHARM REV CODE 250: Performed by: INTERNAL MEDICINE

## 2021-08-21 PROCEDURE — 83735 ASSAY OF MAGNESIUM: CPT | Performed by: INTERNAL MEDICINE

## 2021-08-21 PROCEDURE — 27000207 HC ISOLATION

## 2021-08-21 PROCEDURE — 63600175 PHARM REV CODE 636 W HCPCS: Performed by: INTERNAL MEDICINE

## 2021-08-21 PROCEDURE — 25000242 PHARM REV CODE 250 ALT 637 W/ HCPCS: Performed by: INTERNAL MEDICINE

## 2021-08-21 PROCEDURE — 80053 COMPREHEN METABOLIC PANEL: CPT | Performed by: INTERNAL MEDICINE

## 2021-08-21 PROCEDURE — 94761 N-INVAS EAR/PLS OXIMETRY MLT: CPT

## 2021-08-21 PROCEDURE — 99900035 HC TECH TIME PER 15 MIN (STAT)

## 2021-08-21 PROCEDURE — 94640 AIRWAY INHALATION TREATMENT: CPT

## 2021-08-21 PROCEDURE — 85027 COMPLETE CBC AUTOMATED: CPT | Performed by: INTERNAL MEDICINE

## 2021-08-21 PROCEDURE — 99900031 HC PATIENT EDUCATION (STAT)

## 2021-08-21 PROCEDURE — 85007 BL SMEAR W/DIFF WBC COUNT: CPT | Performed by: INTERNAL MEDICINE

## 2021-08-21 RX ORDER — GUAIFENESIN/DEXTROMETHORPHAN 100-10MG/5
5 SYRUP ORAL EVERY 4 HOURS PRN
Status: DISCONTINUED | OUTPATIENT
Start: 2021-08-21 | End: 2021-08-21

## 2021-08-21 RX ORDER — PROMETHAZINE HYDROCHLORIDE AND CODEINE PHOSPHATE 6.25; 1 MG/5ML; MG/5ML
5 SOLUTION ORAL EVERY 4 HOURS PRN
Status: DISCONTINUED | OUTPATIENT
Start: 2021-08-21 | End: 2021-08-25 | Stop reason: HOSPADM

## 2021-08-21 RX ORDER — LOPERAMIDE HYDROCHLORIDE 2 MG/1
2 CAPSULE ORAL 4 TIMES DAILY PRN
Status: DISCONTINUED | OUTPATIENT
Start: 2021-08-21 | End: 2021-08-25 | Stop reason: HOSPADM

## 2021-08-21 RX ORDER — ZOLPIDEM TARTRATE 5 MG/1
5 TABLET ORAL NIGHTLY PRN
Status: DISCONTINUED | OUTPATIENT
Start: 2021-08-21 | End: 2021-08-25 | Stop reason: HOSPADM

## 2021-08-21 RX ORDER — GUAIFENESIN/DEXTROMETHORPHAN 100-10MG/5
10 SYRUP ORAL EVERY 4 HOURS PRN
Status: DISCONTINUED | OUTPATIENT
Start: 2021-08-21 | End: 2021-08-21

## 2021-08-21 RX ADMIN — SODIUM CHLORIDE AND POTASSIUM CHLORIDE: 9; 1.49 INJECTION, SOLUTION INTRAVENOUS at 04:08

## 2021-08-21 RX ADMIN — PROMETHAZINE HYDROCHLORIDE AND CODEINE PHOSPHATE 5 ML: 10; 6.25 SOLUTION ORAL at 09:08

## 2021-08-21 RX ADMIN — ACETAMINOPHEN 650 MG: 325 TABLET ORAL at 09:08

## 2021-08-21 RX ADMIN — ENOXAPARIN SODIUM 40 MG: 40 INJECTION SUBCUTANEOUS at 03:08

## 2021-08-21 RX ADMIN — IPRATROPIUM BROMIDE AND ALBUTEROL SULFATE 3 ML: .5; 2.5 SOLUTION RESPIRATORY (INHALATION) at 07:08

## 2021-08-21 RX ADMIN — LOPERAMIDE HYDROCHLORIDE 2 MG: 2 CAPSULE ORAL at 04:08

## 2021-08-21 RX ADMIN — GUAIFENESIN AND DEXTROMETHORPHAN 5 ML: 100; 10 SYRUP ORAL at 04:08

## 2021-08-21 RX ADMIN — POTASSIUM CHLORIDE 20 MEQ: 1500 TABLET, EXTENDED RELEASE ORAL at 09:08

## 2021-08-21 RX ADMIN — ZOLPIDEM TARTRATE 5 MG: 5 TABLET ORAL at 09:08

## 2021-08-21 RX ADMIN — DEXAMETHASONE SODIUM PHOSPHATE 6 MG: 4 INJECTION, SOLUTION INTRA-ARTICULAR; INTRALESIONAL; INTRAMUSCULAR; INTRAVENOUS; SOFT TISSUE at 09:08

## 2021-08-21 RX ADMIN — REMDESIVIR 100 MG: 100 INJECTION, POWDER, LYOPHILIZED, FOR SOLUTION INTRAVENOUS at 09:08

## 2021-08-22 LAB
ALBUMIN SERPL BCP-MCNC: 2.4 G/DL (ref 3.5–5.2)
ALP SERPL-CCNC: 57 U/L (ref 55–135)
ALT SERPL W/O P-5'-P-CCNC: 39 U/L (ref 10–44)
ANION GAP SERPL CALC-SCNC: 6 MMOL/L (ref 8–16)
AST SERPL-CCNC: 49 U/L (ref 10–40)
BASOPHILS # BLD AUTO: ABNORMAL K/UL (ref 0–0.2)
BASOPHILS NFR BLD: 0 % (ref 0–1.9)
BILIRUB SERPL-MCNC: 0.4 MG/DL (ref 0.1–1)
BUN SERPL-MCNC: 15 MG/DL (ref 6–20)
CALCIUM SERPL-MCNC: 8.2 MG/DL (ref 8.7–10.5)
CHLORIDE SERPL-SCNC: 118 MMOL/L (ref 95–110)
CO2 SERPL-SCNC: 21 MMOL/L (ref 23–29)
CREAT SERPL-MCNC: 0.7 MG/DL (ref 0.5–1.4)
DIFFERENTIAL METHOD: ABNORMAL
EOSINOPHIL # BLD AUTO: ABNORMAL K/UL (ref 0–0.5)
EOSINOPHIL NFR BLD: 0 % (ref 0–8)
ERYTHROCYTE [DISTWIDTH] IN BLOOD BY AUTOMATED COUNT: 13.7 % (ref 11.5–14.5)
EST. GFR  (AFRICAN AMERICAN): >60 ML/MIN/1.73 M^2
EST. GFR  (NON AFRICAN AMERICAN): >60 ML/MIN/1.73 M^2
GLUCOSE SERPL-MCNC: 89 MG/DL (ref 70–110)
HCT VFR BLD AUTO: 28 % (ref 37–48.5)
HGB BLD-MCNC: 10.1 G/DL (ref 12–16)
IMM GRANULOCYTES # BLD AUTO: ABNORMAL K/UL (ref 0–0.04)
IMM GRANULOCYTES NFR BLD AUTO: ABNORMAL % (ref 0–0.5)
LYMPHOCYTES # BLD AUTO: ABNORMAL K/UL (ref 1–4.8)
LYMPHOCYTES NFR BLD: 25 % (ref 18–48)
MAGNESIUM SERPL-MCNC: 2 MG/DL (ref 1.6–2.6)
MCH RBC QN AUTO: 38.1 PG (ref 27–31)
MCHC RBC AUTO-ENTMCNC: 36.1 G/DL (ref 32–36)
MCV RBC AUTO: 106 FL (ref 82–98)
MONOCYTES # BLD AUTO: ABNORMAL K/UL (ref 0.3–1)
MONOCYTES NFR BLD: 4 % (ref 4–15)
NEUTROPHILS NFR BLD: 66 % (ref 38–73)
NEUTS BAND NFR BLD MANUAL: 5 %
NRBC BLD-RTO: 0 /100 WBC
PLATELET # BLD AUTO: 114 K/UL (ref 150–450)
PMV BLD AUTO: 10.6 FL (ref 9.2–12.9)
POTASSIUM SERPL-SCNC: 3.6 MMOL/L (ref 3.5–5.1)
PROT SERPL-MCNC: 5.8 G/DL (ref 6–8.4)
RBC # BLD AUTO: 2.65 M/UL (ref 4–5.4)
SODIUM SERPL-SCNC: 145 MMOL/L (ref 136–145)
WBC # BLD AUTO: 4.17 K/UL (ref 3.9–12.7)

## 2021-08-22 PROCEDURE — 25000003 PHARM REV CODE 250: Performed by: INTERNAL MEDICINE

## 2021-08-22 PROCEDURE — 85027 COMPLETE CBC AUTOMATED: CPT | Performed by: INTERNAL MEDICINE

## 2021-08-22 PROCEDURE — 63600175 PHARM REV CODE 636 W HCPCS: Performed by: INTERNAL MEDICINE

## 2021-08-22 PROCEDURE — 99900035 HC TECH TIME PER 15 MIN (STAT)

## 2021-08-22 PROCEDURE — 99900031 HC PATIENT EDUCATION (STAT)

## 2021-08-22 PROCEDURE — 27000221 HC OXYGEN, UP TO 24 HOURS

## 2021-08-22 PROCEDURE — 83735 ASSAY OF MAGNESIUM: CPT | Performed by: INTERNAL MEDICINE

## 2021-08-22 PROCEDURE — 94761 N-INVAS EAR/PLS OXIMETRY MLT: CPT

## 2021-08-22 PROCEDURE — 80053 COMPREHEN METABOLIC PANEL: CPT | Performed by: INTERNAL MEDICINE

## 2021-08-22 PROCEDURE — 36415 COLL VENOUS BLD VENIPUNCTURE: CPT | Performed by: INTERNAL MEDICINE

## 2021-08-22 PROCEDURE — 27000207 HC ISOLATION

## 2021-08-22 PROCEDURE — 11000001 HC ACUTE MED/SURG PRIVATE ROOM

## 2021-08-22 PROCEDURE — 85007 BL SMEAR W/DIFF WBC COUNT: CPT | Performed by: INTERNAL MEDICINE

## 2021-08-22 RX ORDER — FUROSEMIDE 10 MG/ML
40 INJECTION INTRAMUSCULAR; INTRAVENOUS
Status: DISCONTINUED | OUTPATIENT
Start: 2021-08-22 | End: 2021-08-25 | Stop reason: HOSPADM

## 2021-08-22 RX ADMIN — POTASSIUM CHLORIDE 20 MEQ: 1500 TABLET, EXTENDED RELEASE ORAL at 08:08

## 2021-08-22 RX ADMIN — CEFTRIAXONE SODIUM 1 G: 1 INJECTION, POWDER, FOR SOLUTION INTRAMUSCULAR; INTRAVENOUS at 10:08

## 2021-08-22 RX ADMIN — AZITHROMYCIN MONOHYDRATE 500 MG: 500 INJECTION, POWDER, LYOPHILIZED, FOR SOLUTION INTRAVENOUS at 12:08

## 2021-08-22 RX ADMIN — ONDANSETRON 4 MG: 2 INJECTION INTRAMUSCULAR; INTRAVENOUS at 12:08

## 2021-08-22 RX ADMIN — AZITHROMYCIN MONOHYDRATE 500 MG: 500 INJECTION, POWDER, LYOPHILIZED, FOR SOLUTION INTRAVENOUS at 10:08

## 2021-08-22 RX ADMIN — FUROSEMIDE 40 MG: 10 INJECTION, SOLUTION INTRAVENOUS at 03:08

## 2021-08-22 RX ADMIN — ENOXAPARIN SODIUM 40 MG: 40 INJECTION SUBCUTANEOUS at 04:08

## 2021-08-22 RX ADMIN — POTASSIUM CHLORIDE 20 MEQ: 1500 TABLET, EXTENDED RELEASE ORAL at 09:08

## 2021-08-22 RX ADMIN — CEFTRIAXONE SODIUM 1 G: 1 INJECTION, POWDER, FOR SOLUTION INTRAMUSCULAR; INTRAVENOUS at 12:08

## 2021-08-22 RX ADMIN — PROMETHAZINE HYDROCHLORIDE AND CODEINE PHOSPHATE 5 ML: 10; 6.25 SOLUTION ORAL at 09:08

## 2021-08-22 RX ADMIN — REMDESIVIR 100 MG: 100 INJECTION, POWDER, LYOPHILIZED, FOR SOLUTION INTRAVENOUS at 09:08

## 2021-08-22 RX ADMIN — DEXAMETHASONE SODIUM PHOSPHATE 6 MG: 4 INJECTION, SOLUTION INTRA-ARTICULAR; INTRALESIONAL; INTRAMUSCULAR; INTRAVENOUS; SOFT TISSUE at 08:08

## 2021-08-22 RX ADMIN — ZOLPIDEM TARTRATE 5 MG: 5 TABLET ORAL at 09:08

## 2021-08-23 LAB
ALBUMIN SERPL BCP-MCNC: 3 G/DL (ref 3.5–5.2)
ALP SERPL-CCNC: 84 U/L (ref 55–135)
ALT SERPL W/O P-5'-P-CCNC: 45 U/L (ref 10–44)
ANION GAP SERPL CALC-SCNC: 9 MMOL/L (ref 8–16)
AST SERPL-CCNC: 39 U/L (ref 10–40)
BASOPHILS # BLD AUTO: ABNORMAL K/UL (ref 0–0.2)
BASOPHILS NFR BLD: 0 % (ref 0–1.9)
BILIRUB SERPL-MCNC: 0.5 MG/DL (ref 0.1–1)
BUN SERPL-MCNC: 15 MG/DL (ref 6–20)
CALCIUM SERPL-MCNC: 8.7 MG/DL (ref 8.7–10.5)
CHLORIDE SERPL-SCNC: 110 MMOL/L (ref 95–110)
CO2 SERPL-SCNC: 25 MMOL/L (ref 23–29)
CREAT SERPL-MCNC: 0.8 MG/DL (ref 0.5–1.4)
DIFFERENTIAL METHOD: ABNORMAL
EOSINOPHIL # BLD AUTO: ABNORMAL K/UL (ref 0–0.5)
EOSINOPHIL NFR BLD: 1 % (ref 0–8)
ERYTHROCYTE [DISTWIDTH] IN BLOOD BY AUTOMATED COUNT: 13.7 % (ref 11.5–14.5)
EST. GFR  (AFRICAN AMERICAN): >60 ML/MIN/1.73 M^2
EST. GFR  (NON AFRICAN AMERICAN): >60 ML/MIN/1.73 M^2
GLUCOSE SERPL-MCNC: 129 MG/DL (ref 70–110)
HCT VFR BLD AUTO: 34.1 % (ref 37–48.5)
HGB BLD-MCNC: 11.9 G/DL (ref 12–16)
IMM GRANULOCYTES # BLD AUTO: ABNORMAL K/UL (ref 0–0.04)
IMM GRANULOCYTES NFR BLD AUTO: ABNORMAL % (ref 0–0.5)
LYMPHOCYTES # BLD AUTO: ABNORMAL K/UL (ref 1–4.8)
LYMPHOCYTES NFR BLD: 25 % (ref 18–48)
MAGNESIUM SERPL-MCNC: 2.1 MG/DL (ref 1.6–2.6)
MCH RBC QN AUTO: 37.3 PG (ref 27–31)
MCHC RBC AUTO-ENTMCNC: 34.9 G/DL (ref 32–36)
MCV RBC AUTO: 107 FL (ref 82–98)
MONOCYTES # BLD AUTO: ABNORMAL K/UL (ref 0.3–1)
MONOCYTES NFR BLD: 2 % (ref 4–15)
NEUTROPHILS NFR BLD: 69 % (ref 38–73)
NEUTS BAND NFR BLD MANUAL: 3 %
NRBC BLD-RTO: 0 /100 WBC
PLATELET # BLD AUTO: 152 K/UL (ref 150–450)
PMV BLD AUTO: 11.1 FL (ref 9.2–12.9)
POTASSIUM SERPL-SCNC: 3.4 MMOL/L (ref 3.5–5.1)
PROT SERPL-MCNC: 7.3 G/DL (ref 6–8.4)
RBC # BLD AUTO: 3.19 M/UL (ref 4–5.4)
SODIUM SERPL-SCNC: 144 MMOL/L (ref 136–145)
WBC # BLD AUTO: 4.9 K/UL (ref 3.9–12.7)

## 2021-08-23 PROCEDURE — 63600175 PHARM REV CODE 636 W HCPCS: Performed by: INTERNAL MEDICINE

## 2021-08-23 PROCEDURE — 99900031 HC PATIENT EDUCATION (STAT)

## 2021-08-23 PROCEDURE — 27000221 HC OXYGEN, UP TO 24 HOURS

## 2021-08-23 PROCEDURE — 11000001 HC ACUTE MED/SURG PRIVATE ROOM

## 2021-08-23 PROCEDURE — 94761 N-INVAS EAR/PLS OXIMETRY MLT: CPT

## 2021-08-23 PROCEDURE — 25000003 PHARM REV CODE 250: Performed by: INTERNAL MEDICINE

## 2021-08-23 PROCEDURE — 83735 ASSAY OF MAGNESIUM: CPT | Performed by: INTERNAL MEDICINE

## 2021-08-23 PROCEDURE — 27000207 HC ISOLATION

## 2021-08-23 PROCEDURE — 85027 COMPLETE CBC AUTOMATED: CPT | Performed by: INTERNAL MEDICINE

## 2021-08-23 PROCEDURE — 97162 PT EVAL MOD COMPLEX 30 MIN: CPT

## 2021-08-23 PROCEDURE — 36415 COLL VENOUS BLD VENIPUNCTURE: CPT | Performed by: INTERNAL MEDICINE

## 2021-08-23 PROCEDURE — 99900035 HC TECH TIME PER 15 MIN (STAT)

## 2021-08-23 PROCEDURE — 85007 BL SMEAR W/DIFF WBC COUNT: CPT | Performed by: INTERNAL MEDICINE

## 2021-08-23 PROCEDURE — 80053 COMPREHEN METABOLIC PANEL: CPT | Performed by: INTERNAL MEDICINE

## 2021-08-23 RX ADMIN — FUROSEMIDE 40 MG: 10 INJECTION, SOLUTION INTRAVENOUS at 04:08

## 2021-08-23 RX ADMIN — ENOXAPARIN SODIUM 40 MG: 40 INJECTION SUBCUTANEOUS at 04:08

## 2021-08-23 RX ADMIN — POTASSIUM CHLORIDE 20 MEQ: 1500 TABLET, EXTENDED RELEASE ORAL at 09:08

## 2021-08-23 RX ADMIN — ZOLPIDEM TARTRATE 5 MG: 5 TABLET ORAL at 08:08

## 2021-08-23 RX ADMIN — REMDESIVIR 100 MG: 100 INJECTION, POWDER, LYOPHILIZED, FOR SOLUTION INTRAVENOUS at 09:08

## 2021-08-23 RX ADMIN — CEFTRIAXONE SODIUM 1 G: 1 INJECTION, POWDER, FOR SOLUTION INTRAMUSCULAR; INTRAVENOUS at 11:08

## 2021-08-23 RX ADMIN — DEXAMETHASONE SODIUM PHOSPHATE 6 MG: 4 INJECTION, SOLUTION INTRA-ARTICULAR; INTRALESIONAL; INTRAMUSCULAR; INTRAVENOUS; SOFT TISSUE at 09:08

## 2021-08-23 RX ADMIN — AZITHROMYCIN MONOHYDRATE 500 MG: 500 INJECTION, POWDER, LYOPHILIZED, FOR SOLUTION INTRAVENOUS at 10:08

## 2021-08-23 RX ADMIN — PROMETHAZINE HYDROCHLORIDE AND CODEINE PHOSPHATE 5 ML: 10; 6.25 SOLUTION ORAL at 08:08

## 2021-08-23 RX ADMIN — POTASSIUM CHLORIDE 20 MEQ: 1500 TABLET, EXTENDED RELEASE ORAL at 08:08

## 2021-08-23 RX ADMIN — ONDANSETRON 4 MG: 2 INJECTION INTRAMUSCULAR; INTRAVENOUS at 01:08

## 2021-08-24 LAB
ALBUMIN SERPL BCP-MCNC: 2.6 G/DL (ref 3.5–5.2)
ALP SERPL-CCNC: 75 U/L (ref 55–135)
ALT SERPL W/O P-5'-P-CCNC: 47 U/L (ref 10–44)
ANION GAP SERPL CALC-SCNC: 8 MMOL/L (ref 8–16)
AST SERPL-CCNC: 36 U/L (ref 10–40)
BASOPHILS # BLD AUTO: ABNORMAL K/UL (ref 0–0.2)
BASOPHILS NFR BLD: 0 % (ref 0–1.9)
BILIRUB SERPL-MCNC: 0.4 MG/DL (ref 0.1–1)
BUN SERPL-MCNC: 17 MG/DL (ref 6–20)
CALCIUM SERPL-MCNC: 8.3 MG/DL (ref 8.7–10.5)
CHLORIDE SERPL-SCNC: 111 MMOL/L (ref 95–110)
CO2 SERPL-SCNC: 26 MMOL/L (ref 23–29)
CREAT SERPL-MCNC: 0.7 MG/DL (ref 0.5–1.4)
DIFFERENTIAL METHOD: ABNORMAL
EOSINOPHIL # BLD AUTO: ABNORMAL K/UL (ref 0–0.5)
EOSINOPHIL NFR BLD: 0 % (ref 0–8)
ERYTHROCYTE [DISTWIDTH] IN BLOOD BY AUTOMATED COUNT: 13.6 % (ref 11.5–14.5)
EST. GFR  (AFRICAN AMERICAN): >60 ML/MIN/1.73 M^2
EST. GFR  (NON AFRICAN AMERICAN): >60 ML/MIN/1.73 M^2
GLUCOSE SERPL-MCNC: 106 MG/DL (ref 70–110)
HCT VFR BLD AUTO: 30.8 % (ref 37–48.5)
HGB BLD-MCNC: 11 G/DL (ref 12–16)
IMM GRANULOCYTES # BLD AUTO: ABNORMAL K/UL (ref 0–0.04)
IMM GRANULOCYTES NFR BLD AUTO: ABNORMAL % (ref 0–0.5)
LYMPHOCYTES # BLD AUTO: ABNORMAL K/UL (ref 1–4.8)
LYMPHOCYTES NFR BLD: 38 % (ref 18–48)
MAGNESIUM SERPL-MCNC: 2.2 MG/DL (ref 1.6–2.6)
MCH RBC QN AUTO: 37.9 PG (ref 27–31)
MCHC RBC AUTO-ENTMCNC: 35.7 G/DL (ref 32–36)
MCV RBC AUTO: 106 FL (ref 82–98)
MONOCYTES # BLD AUTO: ABNORMAL K/UL (ref 0.3–1)
MONOCYTES NFR BLD: 5 % (ref 4–15)
NEUTROPHILS NFR BLD: 56 % (ref 38–73)
NEUTS BAND NFR BLD MANUAL: 1 %
NRBC BLD-RTO: 0 /100 WBC
PLATELET # BLD AUTO: 143 K/UL (ref 150–450)
PMV BLD AUTO: 10.6 FL (ref 9.2–12.9)
POTASSIUM SERPL-SCNC: 3.6 MMOL/L (ref 3.5–5.1)
PROT SERPL-MCNC: 6.2 G/DL (ref 6–8.4)
RBC # BLD AUTO: 2.9 M/UL (ref 4–5.4)
SODIUM SERPL-SCNC: 145 MMOL/L (ref 136–145)
WBC # BLD AUTO: 3.81 K/UL (ref 3.9–12.7)

## 2021-08-24 PROCEDURE — 36415 COLL VENOUS BLD VENIPUNCTURE: CPT | Performed by: INTERNAL MEDICINE

## 2021-08-24 PROCEDURE — 25000003 PHARM REV CODE 250: Performed by: INTERNAL MEDICINE

## 2021-08-24 PROCEDURE — 63600175 PHARM REV CODE 636 W HCPCS: Performed by: INTERNAL MEDICINE

## 2021-08-24 PROCEDURE — 11000001 HC ACUTE MED/SURG PRIVATE ROOM

## 2021-08-24 PROCEDURE — 27000221 HC OXYGEN, UP TO 24 HOURS

## 2021-08-24 PROCEDURE — 99900035 HC TECH TIME PER 15 MIN (STAT)

## 2021-08-24 PROCEDURE — 85007 BL SMEAR W/DIFF WBC COUNT: CPT | Performed by: INTERNAL MEDICINE

## 2021-08-24 PROCEDURE — 94761 N-INVAS EAR/PLS OXIMETRY MLT: CPT

## 2021-08-24 PROCEDURE — 27000207 HC ISOLATION

## 2021-08-24 PROCEDURE — 99900031 HC PATIENT EDUCATION (STAT)

## 2021-08-24 PROCEDURE — 63700000 PHARM REV CODE 250 ALT 637 W/O HCPCS: Performed by: NURSE PRACTITIONER

## 2021-08-24 PROCEDURE — 85027 COMPLETE CBC AUTOMATED: CPT | Performed by: INTERNAL MEDICINE

## 2021-08-24 PROCEDURE — 80053 COMPREHEN METABOLIC PANEL: CPT | Performed by: INTERNAL MEDICINE

## 2021-08-24 PROCEDURE — 83735 ASSAY OF MAGNESIUM: CPT | Performed by: INTERNAL MEDICINE

## 2021-08-24 RX ORDER — FLUCONAZOLE 100 MG/1
200 TABLET ORAL DAILY
Status: DISCONTINUED | OUTPATIENT
Start: 2021-08-24 | End: 2021-08-25 | Stop reason: HOSPADM

## 2021-08-24 RX ADMIN — POTASSIUM CHLORIDE 20 MEQ: 1500 TABLET, EXTENDED RELEASE ORAL at 09:08

## 2021-08-24 RX ADMIN — ENOXAPARIN SODIUM 40 MG: 40 INJECTION SUBCUTANEOUS at 04:08

## 2021-08-24 RX ADMIN — PROMETHAZINE HYDROCHLORIDE AND CODEINE PHOSPHATE 5 ML: 10; 6.25 SOLUTION ORAL at 09:08

## 2021-08-24 RX ADMIN — ZOLPIDEM TARTRATE 5 MG: 5 TABLET ORAL at 09:08

## 2021-08-24 RX ADMIN — ONDANSETRON 4 MG: 2 INJECTION INTRAMUSCULAR; INTRAVENOUS at 10:08

## 2021-08-24 RX ADMIN — FLUCONAZOLE 200 MG: 100 TABLET ORAL at 12:08

## 2021-08-24 RX ADMIN — REMDESIVIR 100 MG: 100 INJECTION, POWDER, LYOPHILIZED, FOR SOLUTION INTRAVENOUS at 09:08

## 2021-08-24 RX ADMIN — AZITHROMYCIN MONOHYDRATE 500 MG: 500 INJECTION, POWDER, LYOPHILIZED, FOR SOLUTION INTRAVENOUS at 11:08

## 2021-08-24 RX ADMIN — CEFTRIAXONE SODIUM 1 G: 1 INJECTION, POWDER, FOR SOLUTION INTRAMUSCULAR; INTRAVENOUS at 11:08

## 2021-08-24 RX ADMIN — FUROSEMIDE 40 MG: 10 INJECTION, SOLUTION INTRAVENOUS at 03:08

## 2021-08-24 RX ADMIN — DEXAMETHASONE SODIUM PHOSPHATE 6 MG: 4 INJECTION, SOLUTION INTRA-ARTICULAR; INTRALESIONAL; INTRAMUSCULAR; INTRAVENOUS; SOFT TISSUE at 09:08

## 2021-08-25 VITALS
TEMPERATURE: 98 F | HEIGHT: 63 IN | SYSTOLIC BLOOD PRESSURE: 101 MMHG | BODY MASS INDEX: 36.14 KG/M2 | WEIGHT: 204 LBS | RESPIRATION RATE: 14 BRPM | HEART RATE: 68 BPM | DIASTOLIC BLOOD PRESSURE: 72 MMHG | OXYGEN SATURATION: 97 %

## 2021-08-25 LAB
ALBUMIN SERPL BCP-MCNC: 2.8 G/DL (ref 3.5–5.2)
ALP SERPL-CCNC: 77 U/L (ref 55–135)
ALT SERPL W/O P-5'-P-CCNC: 64 U/L (ref 10–44)
ANION GAP SERPL CALC-SCNC: 8 MMOL/L (ref 8–16)
AST SERPL-CCNC: 37 U/L (ref 10–40)
BACTERIA BLD CULT: NORMAL
BACTERIA BLD CULT: NORMAL
BASOPHILS # BLD AUTO: ABNORMAL K/UL (ref 0–0.2)
BASOPHILS NFR BLD: 0 % (ref 0–1.9)
BILIRUB SERPL-MCNC: 0.4 MG/DL (ref 0.1–1)
BUN SERPL-MCNC: 19 MG/DL (ref 6–20)
CALCIUM SERPL-MCNC: 8.2 MG/DL (ref 8.7–10.5)
CHLORIDE SERPL-SCNC: 107 MMOL/L (ref 95–110)
CO2 SERPL-SCNC: 28 MMOL/L (ref 23–29)
CREAT SERPL-MCNC: 0.7 MG/DL (ref 0.5–1.4)
DIFFERENTIAL METHOD: ABNORMAL
EOSINOPHIL # BLD AUTO: ABNORMAL K/UL (ref 0–0.5)
EOSINOPHIL NFR BLD: 0 % (ref 0–8)
ERYTHROCYTE [DISTWIDTH] IN BLOOD BY AUTOMATED COUNT: 13.3 % (ref 11.5–14.5)
EST. GFR  (AFRICAN AMERICAN): >60 ML/MIN/1.73 M^2
EST. GFR  (NON AFRICAN AMERICAN): >60 ML/MIN/1.73 M^2
GLUCOSE SERPL-MCNC: 112 MG/DL (ref 70–110)
HCT VFR BLD AUTO: 32.2 % (ref 37–48.5)
HGB BLD-MCNC: 11.6 G/DL (ref 12–16)
IMM GRANULOCYTES # BLD AUTO: ABNORMAL K/UL (ref 0–0.04)
IMM GRANULOCYTES NFR BLD AUTO: ABNORMAL % (ref 0–0.5)
LYMPHOCYTES # BLD AUTO: ABNORMAL K/UL (ref 1–4.8)
LYMPHOCYTES NFR BLD: 40 % (ref 18–48)
MAGNESIUM SERPL-MCNC: 1.9 MG/DL (ref 1.6–2.6)
MCH RBC QN AUTO: 37.9 PG (ref 27–31)
MCHC RBC AUTO-ENTMCNC: 36 G/DL (ref 32–36)
MCV RBC AUTO: 105 FL (ref 82–98)
MONOCYTES # BLD AUTO: ABNORMAL K/UL (ref 0.3–1)
MONOCYTES NFR BLD: 5 % (ref 4–15)
NEUTROPHILS NFR BLD: 53 % (ref 38–73)
NEUTS BAND NFR BLD MANUAL: 2 %
NRBC BLD-RTO: 0 /100 WBC
PLATELET # BLD AUTO: 139 K/UL (ref 150–450)
PMV BLD AUTO: 11.1 FL (ref 9.2–12.9)
POTASSIUM SERPL-SCNC: 3.6 MMOL/L (ref 3.5–5.1)
PROT SERPL-MCNC: 6.7 G/DL (ref 6–8.4)
RBC # BLD AUTO: 3.06 M/UL (ref 4–5.4)
SODIUM SERPL-SCNC: 143 MMOL/L (ref 136–145)
WBC # BLD AUTO: 3.32 K/UL (ref 3.9–12.7)

## 2021-08-25 PROCEDURE — 80053 COMPREHEN METABOLIC PANEL: CPT | Performed by: INTERNAL MEDICINE

## 2021-08-25 PROCEDURE — 85007 BL SMEAR W/DIFF WBC COUNT: CPT | Performed by: INTERNAL MEDICINE

## 2021-08-25 PROCEDURE — 85027 COMPLETE CBC AUTOMATED: CPT | Performed by: INTERNAL MEDICINE

## 2021-08-25 PROCEDURE — 25000003 PHARM REV CODE 250: Performed by: INTERNAL MEDICINE

## 2021-08-25 PROCEDURE — 63600175 PHARM REV CODE 636 W HCPCS: Performed by: INTERNAL MEDICINE

## 2021-08-25 PROCEDURE — 36415 COLL VENOUS BLD VENIPUNCTURE: CPT | Performed by: INTERNAL MEDICINE

## 2021-08-25 PROCEDURE — 63700000 PHARM REV CODE 250 ALT 637 W/O HCPCS: Performed by: NURSE PRACTITIONER

## 2021-08-25 PROCEDURE — 83735 ASSAY OF MAGNESIUM: CPT | Performed by: INTERNAL MEDICINE

## 2021-08-25 RX ORDER — PROMETHAZINE HYDROCHLORIDE AND DEXTROMETHORPHAN HYDROBROMIDE 6.25; 15 MG/5ML; MG/5ML
5 SYRUP ORAL EVERY 6 HOURS PRN
Qty: 100 ML | Refills: 0 | Status: SHIPPED | OUTPATIENT
Start: 2021-08-25 | End: 2021-09-04

## 2021-08-25 RX ORDER — CEFUROXIME AXETIL 500 MG/1
500 TABLET ORAL EVERY 12 HOURS
Qty: 10 TABLET | Refills: 0 | Status: SHIPPED | OUTPATIENT
Start: 2021-08-25 | End: 2021-08-30

## 2021-08-25 RX ORDER — FLUCONAZOLE 200 MG/1
200 TABLET ORAL DAILY
Qty: 1 TABLET | Refills: 0 | Status: SHIPPED | OUTPATIENT
Start: 2021-08-26 | End: 2021-08-27

## 2021-08-25 RX ORDER — PREDNISONE 20 MG/1
20 TABLET ORAL DAILY
Qty: 7 TABLET | Refills: 0 | Status: SHIPPED | OUTPATIENT
Start: 2021-08-25 | End: 2021-09-01

## 2021-08-25 RX ORDER — LOPERAMIDE HYDROCHLORIDE 2 MG/1
2 CAPSULE ORAL 4 TIMES DAILY PRN
Refills: 0
Start: 2021-08-25 | End: 2021-09-04

## 2021-08-25 RX ADMIN — POTASSIUM CHLORIDE 20 MEQ: 1500 TABLET, EXTENDED RELEASE ORAL at 08:08

## 2021-08-25 RX ADMIN — FUROSEMIDE 40 MG: 10 INJECTION, SOLUTION INTRAVENOUS at 03:08

## 2021-08-25 RX ADMIN — FLUCONAZOLE 200 MG: 100 TABLET ORAL at 08:08

## 2021-08-25 RX ADMIN — DEXAMETHASONE SODIUM PHOSPHATE 6 MG: 4 INJECTION, SOLUTION INTRA-ARTICULAR; INTRALESIONAL; INTRAMUSCULAR; INTRAVENOUS; SOFT TISSUE at 08:08

## 2021-09-16 ENCOUNTER — HOSPITAL ENCOUNTER (OUTPATIENT)
Dept: RADIOLOGY | Facility: HOSPITAL | Age: 41
Discharge: HOME | End: 2021-09-16
Attending: NURSE PRACTITIONER
Payer: MEDICAID

## 2021-09-16 DIAGNOSIS — R05.9 COUGH: ICD-10-CM

## 2021-09-16 DIAGNOSIS — R05.9 COUGH: Primary | ICD-10-CM

## 2021-09-16 PROCEDURE — 71046 X-RAY EXAM CHEST 2 VIEWS: CPT | Mod: TC

## 2021-10-25 DIAGNOSIS — R05.9 COUGH: Primary | ICD-10-CM

## 2021-10-26 ENCOUNTER — HOSPITAL ENCOUNTER (EMERGENCY)
Facility: HOSPITAL | Age: 41
Discharge: HOME | End: 2021-10-26
Attending: STUDENT IN AN ORGANIZED HEALTH CARE EDUCATION/TRAINING PROGRAM
Payer: MEDICAID

## 2021-10-26 VITALS
WEIGHT: 207 LBS | RESPIRATION RATE: 16 BRPM | OXYGEN SATURATION: 98 % | DIASTOLIC BLOOD PRESSURE: 74 MMHG | TEMPERATURE: 99 F | HEART RATE: 75 BPM | SYSTOLIC BLOOD PRESSURE: 132 MMHG | BODY MASS INDEX: 36.67 KG/M2

## 2021-10-26 DIAGNOSIS — M25.571 ACUTE RIGHT ANKLE PAIN: ICD-10-CM

## 2021-10-26 DIAGNOSIS — S99.921A INJURY OF RIGHT FOOT, INITIAL ENCOUNTER: Primary | ICD-10-CM

## 2021-10-26 DIAGNOSIS — M25.579 ANKLE PAIN: ICD-10-CM

## 2021-10-26 PROCEDURE — 99283 EMERGENCY DEPT VISIT LOW MDM: CPT | Mod: 25

## 2022-02-10 ENCOUNTER — HOSPITAL ENCOUNTER (EMERGENCY)
Facility: HOSPITAL | Age: 42
Discharge: HOME | End: 2022-02-10
Attending: EMERGENCY MEDICINE
Payer: MEDICAID

## 2022-02-10 VITALS
SYSTOLIC BLOOD PRESSURE: 131 MMHG | DIASTOLIC BLOOD PRESSURE: 82 MMHG | HEART RATE: 88 BPM | BODY MASS INDEX: 37.55 KG/M2 | RESPIRATION RATE: 18 BRPM | TEMPERATURE: 98 F | WEIGHT: 212 LBS | OXYGEN SATURATION: 97 %

## 2022-02-10 DIAGNOSIS — J06.9 UPPER RESPIRATORY TRACT INFECTION, UNSPECIFIED TYPE: Primary | ICD-10-CM

## 2022-02-10 LAB
CTP QC/QA: YES
CTP QC/QA: YES
POC MOLECULAR INFLUENZA A AGN: NEGATIVE
POC MOLECULAR INFLUENZA B AGN: NEGATIVE
SARS-COV-2 RDRP RESP QL NAA+PROBE: NEGATIVE

## 2022-02-10 PROCEDURE — 99282 EMERGENCY DEPT VISIT SF MDM: CPT

## 2022-02-10 PROCEDURE — U0002 COVID-19 LAB TEST NON-CDC: HCPCS | Performed by: EMERGENCY MEDICINE

## 2022-02-10 NOTE — ED PROVIDER NOTES
Encounter Date: 2/10/2022       History     Chief Complaint   Patient presents with    Diarrhea     Pt c/o fever, nausea, body aches and diarrhea.  Onset yesterday.     41-year-old female presents the emergency department with body aches, sinus congestion, cough, nausea diarrhea for the past 2 days.  States having fever at home as well to 102 this morning, took Tylenol.  Denies any other issues.  She is not ill appearing, denies any shortness of breath.  Alert oriented x4, GCS is 15, afebrile        Review of patient's allergies indicates:   Allergen Reactions    Clindamycin Swelling     Tongue swellong    Iodine and iodide containing products Nausea And Vomiting     Past Medical History:   Diagnosis Date    Allergies     Arthritis     Chronic nausea     Crohn's colitis     Heart murmur     High cholesterol     History of rectal surgery     Neuropathy      Past Surgical History:   Procedure Laterality Date    APPENDECTOMY       SECTION      CHOLECYSTECTOMY      COLONOSCOPY N/A 2020    Procedure: COLONOSCOPY;  Surgeon: Brittnee Dueñas MD;  Location: [facility];  Service: Endoscopy;  Laterality: N/A;    COLONOSCOPY N/A 2021    Procedure: COLONOSCOPY;  Surgeon: Brittnee Dueñas MD;  Location: [facility];  Service: Endoscopy;  Laterality: N/A;  needs rapid COVID    NASAL FRACTURE SURGERY      tubes in ears 6 times       Family History   Problem Relation Age of Onset    Breast cancer Mother     Ovarian cancer Mother     Lung cancer Mother     No Known Problems Father     No Known Problems Sister     No Known Problems Daughter     No Known Problems Maternal Aunt     No Known Problems Maternal Uncle     No Known Problems Paternal Aunt     No Known Problems Paternal Uncle     No Known Problems Maternal Grandmother     No Known Problems Maternal Grandfather     No Known Problems Paternal Grandmother     No Known Problems Paternal Grandfather     BRCA 1/2 Neg Hx       Social History     Tobacco Use    Smoking status: Current Every Day Smoker     Packs/day: 1.00     Years: 19.00     Pack years: 19.00     Types: Cigarettes    Smokeless tobacco: Never Used   Substance Use Topics    Alcohol use: Never    Drug use: Not Currently     Types: Marijuana     Review of Systems   Constitutional: Negative for fever.   HENT: Positive for congestion. Negative for sore throat.    Respiratory: Negative for shortness of breath.    Cardiovascular: Negative for chest pain.   Gastrointestinal: Positive for diarrhea and nausea.   Genitourinary: Negative for dysuria.   Musculoskeletal: Positive for myalgias. Negative for back pain.   Skin: Negative for rash.   Neurological: Negative for weakness.   Hematological: Does not bruise/bleed easily.   All other systems reviewed and are negative.      Physical Exam     Initial Vitals [02/10/22 0718]   BP Pulse Resp Temp SpO2   131/82 88 18 98.3 °F (36.8 °C) 97 %      MAP       --         Physical Exam    Nursing note and vitals reviewed.  Constitutional: She appears well-developed and well-nourished. She is not diaphoretic. No distress.   HENT:   Head: Normocephalic and atraumatic.   Eyes: Conjunctivae and EOM are normal. Pupils are equal, round, and reactive to light.   Neck: Neck supple.   Normal range of motion.  Cardiovascular: Normal rate, regular rhythm, normal heart sounds and intact distal pulses.   No murmur heard.  Pulmonary/Chest: Breath sounds normal. No respiratory distress. She has no wheezes. She has no rhonchi. She has no rales. She exhibits no tenderness.   Abdominal: Abdomen is soft. Bowel sounds are normal.   Musculoskeletal:         General: No tenderness or edema. Normal range of motion.      Cervical back: Normal range of motion and neck supple.     Neurological: She is alert and oriented to person, place, and time. She has normal strength. No cranial nerve deficit. GCS score is 15. GCS eye subscore is 4. GCS verbal subscore is 5.  GCS motor subscore is 6.   Skin: Skin is warm and dry. Capillary refill takes less than 2 seconds.         ED Course   Procedures  Labs Reviewed   SARS-COV-2 RDRP GENE    Narrative:     This test utilizes isothermal nucleic acid amplification   technology to detect the SARS-CoV-2 RdRp nucleic acid segment.   The analytical sensitivity (limit of detection) is 125 genome   equivalents/mL.   A POSITIVE result implies infection with the SARS-CoV-2 virus;   the patient is presumed to be contagious.     A NEGATIVE result means that SARS-CoV-2 nucleic acids are not   present above the limit of detection. A NEGATIVE result should be   treated as presumptive. It does not rule out the possibility of   COVID-19 and should not be the sole basis for treatment decisions.   If COVID-19 is strongly suspected based on clinical and exposure   history, re-testing using an alternate molecular assay should be   considered.   This test is only for use under the Food and Drug   Administration s Emergency Use Authorization (EUA).   Commercial kits are provided by [employer].   Performance characteristics of the EUA have been independently   verified by [facility] Department of   Pathology and Laboratory Medicine.   _________________________________________________________________   The authorized Fact Sheet for Healthcare Providers and the authorized Fact   Sheet for Patients of the ID NOW COVID-19 are available on the FDA   website:     https://www.fda.gov/media/845298/download  https://www.fda.gov/media/613643/download         POCT INFLUENZA A/B MOLECULAR          Imaging Results    None          Medications - No data to display  Medical Decision Making:   Differential Diagnosis:   URI, viral syndrome, COVID-19, influenza                      Clinical Impression:   Final diagnoses:  [J06.9] Upper respiratory tract infection, unspecified type (Primary)          ED Disposition Condition    Discharge Stable        ED  Prescriptions     None        Follow-up Information     Follow up With Specialties Details Why Contact Info    Primary care physician  In 2 days             Satinder Rosa MD  02/10/22 8176

## 2022-02-14 ENCOUNTER — PATIENT OUTREACH (OUTPATIENT)
Dept: EMERGENCY MEDICINE | Facility: HOSPITAL | Age: 42
End: 2022-02-14
Payer: MEDICAID

## 2022-02-14 NOTE — PROGRESS NOTES
Patient declined assistance making a follow-up appointment with her PCP at this time. Patient declined ED navigation assessment and denied any needs/resources at this time.     Regina Tijerina  ED Navigator- [place]

## 2022-07-27 ENCOUNTER — HOSPITAL ENCOUNTER (EMERGENCY)
Facility: HOSPITAL | Age: 42
Discharge: HOME | End: 2022-07-27
Attending: EMERGENCY MEDICINE
Payer: MEDICAID

## 2022-07-27 VITALS
TEMPERATURE: 99 F | RESPIRATION RATE: 18 BRPM | BODY MASS INDEX: 34.73 KG/M2 | WEIGHT: 196 LBS | OXYGEN SATURATION: 99 % | HEIGHT: 63 IN | DIASTOLIC BLOOD PRESSURE: 85 MMHG | HEART RATE: 97 BPM | SYSTOLIC BLOOD PRESSURE: 139 MMHG

## 2022-07-27 DIAGNOSIS — J06.9 UPPER RESPIRATORY TRACT INFECTION, UNSPECIFIED TYPE: Primary | ICD-10-CM

## 2022-07-27 LAB
CTP QC/QA: YES
SARS-COV-2 RDRP RESP QL NAA+PROBE: NEGATIVE

## 2022-07-27 PROCEDURE — 99284 EMERGENCY DEPT VISIT MOD MDM: CPT | Mod: 25

## 2022-07-27 PROCEDURE — U0002 COVID-19 LAB TEST NON-CDC: HCPCS | Performed by: CLINICAL NURSE SPECIALIST

## 2022-07-27 RX ORDER — PREDNISONE 20 MG/1
20 TABLET ORAL DAILY
Qty: 5 TABLET | Refills: 0 | Status: SHIPPED | OUTPATIENT
Start: 2022-07-27 | End: 2022-08-01

## 2022-07-27 RX ORDER — BENZONATATE 100 MG/1
100 CAPSULE ORAL 3 TIMES DAILY PRN
Qty: 20 CAPSULE | Refills: 0 | Status: SHIPPED | OUTPATIENT
Start: 2022-07-27 | End: 2022-08-06

## 2022-07-27 RX ORDER — MECLIZINE HYDROCHLORIDE 25 MG/1
25 TABLET ORAL 3 TIMES DAILY PRN
Qty: 20 TABLET | Refills: 0 | Status: SHIPPED | OUTPATIENT
Start: 2022-07-27 | End: 2023-02-16

## 2022-07-27 NOTE — Clinical Note
"Sadaf "Alin Dhaliwal was seen and treated in our emergency department on 7/27/2022.     COVID-19 is present in our communities across the state. There is limited testing for COVID at this time, so not all patients can be tested. In this situation, your employee meets the following criteria:    Sadaf Dhaliwal has met the criteria for COVID-19 testing and has a NEGATIVE result. The employee can return to work once they are asymptomatic for 24 hours without the use of fever reducing medications (Tylenol, Motrin, etc).     If the employee is not fully vaccinated and had a close contact:  · Retest at 5 to 7 days post-exposure  · If possible, it is recommended that they quarantine for 5 days from the time of contact regardless of their test status.  · A mask should be worn post quarantine for 5 days.    If you have any questions or concerns, or if I can be of further assistance, please do not hesitate to contact me.    Sincerely,             Gavin Phillips MD"

## 2022-07-27 NOTE — Clinical Note
"Sadaf Marie" Madhuri was seen and treated in our emergency department on 7/27/2022.  She may return to work on 07/30/2022.       If you have any questions or concerns, please don't hesitate to call.      Shayla Campos RN    "

## 2022-07-28 NOTE — ED PROVIDER NOTES
Encounter Date: 2022       History     Chief Complaint   Patient presents with    COVID-19 Concerns     Cough, sore throat, weakness, dizziness x 4 days. Reports covid exposure.      Sadaf Dhaliwal is an 41 y.o. female who complains of cough, sore throat, weakness, dizziness for the last 4 days.  Positive COVID exposure.  Requesting COVID swab.        Review of patient's allergies indicates:   Allergen Reactions    Clindamycin Swelling     Tongue swellong    Iodine and iodide containing products Nausea And Vomiting     Past Medical History:   Diagnosis Date    Allergies     Arthritis     Chronic nausea     Crohn's colitis     Heart murmur     High cholesterol     History of rectal surgery     Neuropathy      Past Surgical History:   Procedure Laterality Date    APPENDECTOMY       SECTION      CHOLECYSTECTOMY      COLONOSCOPY N/A 2020    Procedure: COLONOSCOPY;  Surgeon: Brittnee Dueñas MD;  Location: [facility];  Service: Endoscopy;  Laterality: N/A;    COLONOSCOPY N/A 2021    Procedure: COLONOSCOPY;  Surgeon: Brittnee Dueñas MD;  Location: [facility];  Service: Endoscopy;  Laterality: N/A;  needs rapid COVID    NASAL FRACTURE SURGERY      tubes in ears 6 times       Family History   Problem Relation Age of Onset    Breast cancer Mother     Ovarian cancer Mother     Lung cancer Mother     No Known Problems Father     No Known Problems Sister     No Known Problems Daughter     No Known Problems Maternal Aunt     No Known Problems Maternal Uncle     No Known Problems Paternal Aunt     No Known Problems Paternal Uncle     No Known Problems Maternal Grandmother     No Known Problems Maternal Grandfather     No Known Problems Paternal Grandmother     No Known Problems Paternal Grandfather     BRCA 1/2 Neg Hx      Social History     Tobacco Use    Smoking status: Current Every Day Smoker     Packs/day: 1.00     Years: 19.00     Pack years: 19.00      Types: Cigarettes    Smokeless tobacco: Never Used   Substance Use Topics    Alcohol use: Never    Drug use: Not Currently     Types: Marijuana     Review of Systems   Constitutional: Negative for fever.   HENT: Positive for sore throat.    Respiratory: Positive for cough. Negative for shortness of breath.    Cardiovascular: Negative for chest pain.   Gastrointestinal: Negative for nausea.   Genitourinary: Negative for dysuria.   Musculoskeletal: Negative for back pain.   Skin: Negative for rash.   Neurological: Positive for dizziness and weakness.   Hematological: Does not bruise/bleed easily.   All other systems reviewed and are negative.      Physical Exam     Initial Vitals [07/27/22 1900]   BP Pulse Resp Temp SpO2   139/85 97 18 99.1 °F (37.3 °C) 99 %      MAP       --         Physical Exam    Nursing note and vitals reviewed.  Constitutional: She appears well-developed and well-nourished.   HENT:   Head: Normocephalic and atraumatic.   Eyes: Pupils are equal, round, and reactive to light.   Neck:   Normal range of motion.  Cardiovascular: Normal rate and regular rhythm.   Pulmonary/Chest: She has wheezes.   Abdominal: Abdomen is soft. Bowel sounds are normal.   Musculoskeletal:         General: Normal range of motion.      Cervical back: Normal range of motion.     Neurological: She is alert and oriented to person, place, and time.   Skin: Skin is warm and dry.   Psychiatric: She has a normal mood and affect.         ED Course   Procedures  Labs Reviewed   SARS-COV-2 RDRP GENE          Imaging Results    None          Medications - No data to display  Medical Decision Making:   Differential Diagnosis:   COVID, URI  Clinical Tests:   Lab Tests: Ordered and Reviewed                      Clinical Impression:   Final diagnoses:  [J06.9] Upper respiratory tract infection, unspecified type (Primary)          ED Disposition Condition    Discharge Stable        ED Prescriptions     Medication Sig Dispense Start  Date End Date Auth. Provider    benzonatate (TESSALON) 100 MG capsule Take 1 capsule (100 mg total) by mouth 3 (three) times daily as needed for Cough. 20 capsule 7/27/2022 8/6/2022 Ella Tolentino NP    predniSONE (DELTASONE) 20 MG tablet Take 1 tablet (20 mg total) by mouth once daily. for 5 days 5 tablet 7/27/2022 8/1/2022 Ella Tolentino NP    meclizine (ANTIVERT) 25 mg tablet Take 1 tablet (25 mg total) by mouth 3 (three) times daily as needed for Dizziness. 20 tablet 7/27/2022  Ella Tolentino NP        Follow-up Information     Follow up With Specialties Details Why Contact Info    Myranda Tolentino NP Family Medicine  As needed [address] [place] [id number]  [phone]             Ella Tolentino NP  07/27/22 2041

## 2023-02-16 ENCOUNTER — OFFICE VISIT (OUTPATIENT)
Dept: PRIMARY CARE CLINIC | Facility: CLINIC | Age: 43
End: 2023-02-16
Payer: MEDICAID

## 2023-02-16 VITALS
OXYGEN SATURATION: 98 % | DIASTOLIC BLOOD PRESSURE: 100 MMHG | SYSTOLIC BLOOD PRESSURE: 175 MMHG | HEIGHT: 63 IN | RESPIRATION RATE: 14 BRPM | HEART RATE: 90 BPM | WEIGHT: 195 LBS | BODY MASS INDEX: 34.55 KG/M2 | TEMPERATURE: 99 F

## 2023-02-16 DIAGNOSIS — E03.9 HYPOTHYROIDISM, UNSPECIFIED TYPE: ICD-10-CM

## 2023-02-16 DIAGNOSIS — I10 PRIMARY HYPERTENSION: Primary | ICD-10-CM

## 2023-02-16 DIAGNOSIS — F32.A ANXIETY AND DEPRESSION: ICD-10-CM

## 2023-02-16 DIAGNOSIS — F41.9 RECURRENT MILD MAJOR DEPRESSIVE DISORDER WITH ANXIETY: ICD-10-CM

## 2023-02-16 DIAGNOSIS — E66.9 CLASS 1 OBESITY WITH SERIOUS COMORBIDITY AND BODY MASS INDEX (BMI) OF 34.0 TO 34.9 IN ADULT, UNSPECIFIED OBESITY TYPE: ICD-10-CM

## 2023-02-16 DIAGNOSIS — F41.9 ANXIETY AND DEPRESSION: ICD-10-CM

## 2023-02-16 DIAGNOSIS — M25.512 ACUTE PAIN OF LEFT SHOULDER: ICD-10-CM

## 2023-02-16 DIAGNOSIS — Z59.9 FINANCIAL DIFFICULTIES: ICD-10-CM

## 2023-02-16 DIAGNOSIS — F33.0 RECURRENT MILD MAJOR DEPRESSIVE DISORDER WITH ANXIETY: ICD-10-CM

## 2023-02-16 DIAGNOSIS — K50.114 CROHN'S DISEASE OF LARGE INTESTINE WITH ABSCESS: ICD-10-CM

## 2023-02-16 DIAGNOSIS — R79.89 ELEVATED LFTS: ICD-10-CM

## 2023-02-16 DIAGNOSIS — E78.5 HYPERLIPIDEMIA, UNSPECIFIED HYPERLIPIDEMIA TYPE: ICD-10-CM

## 2023-02-16 DIAGNOSIS — Z12.39 ENCOUNTER FOR SCREENING FOR MALIGNANT NEOPLASM OF BREAST, UNSPECIFIED SCREENING MODALITY: ICD-10-CM

## 2023-02-16 PROBLEM — E66.01 SEVERE OBESITY (BMI >= 40): Status: ACTIVE | Noted: 2023-02-16

## 2023-02-16 PROBLEM — E66.811 CLASS 1 OBESITY WITH SERIOUS COMORBIDITY AND BODY MASS INDEX (BMI) OF 34.0 TO 34.9 IN ADULT: Status: ACTIVE | Noted: 2023-02-16

## 2023-02-16 LAB
ALBUMIN SERPL BCP-MCNC: 3.8 G/DL (ref 3.5–5.2)
ALP SERPL-CCNC: 115 U/L (ref 55–135)
ALT SERPL W/O P-5'-P-CCNC: 39 U/L (ref 10–44)
ANION GAP SERPL CALC-SCNC: 3 MMOL/L (ref 8–16)
AST SERPL-CCNC: 31 U/L (ref 10–40)
BILIRUB SERPL-MCNC: 0.2 MG/DL (ref 0.1–1)
BUN SERPL-MCNC: 8 MG/DL (ref 6–20)
CALCIUM SERPL-MCNC: 9.1 MG/DL (ref 8.7–10.5)
CHLORIDE SERPL-SCNC: 109 MMOL/L (ref 95–110)
CHOLEST SERPL-MCNC: 284 MG/DL (ref 120–199)
CHOLEST/HDLC SERPL: 7.9 {RATIO} (ref 2–5)
CO2 SERPL-SCNC: 29 MMOL/L (ref 23–29)
CREAT SERPL-MCNC: 0.9 MG/DL (ref 0.5–1.4)
EST. GFR  (NO RACE VARIABLE): >60 ML/MIN/1.73 M^2
GLUCOSE SERPL-MCNC: 73 MG/DL (ref 70–110)
HDLC SERPL-MCNC: 36 MG/DL (ref 40–75)
HDLC SERPL: 12.7 % (ref 20–50)
LDLC SERPL CALC-MCNC: 208.6 MG/DL (ref 63–159)
NONHDLC SERPL-MCNC: 248 MG/DL
POTASSIUM SERPL-SCNC: 4 MMOL/L (ref 3.5–5.1)
PROT SERPL-MCNC: 8.1 G/DL (ref 6–8.4)
SODIUM SERPL-SCNC: 141 MMOL/L (ref 136–145)
T4 FREE SERPL-MCNC: 0.86 NG/DL (ref 0.71–1.51)
TRIGL SERPL-MCNC: 197 MG/DL (ref 30–150)
TSH SERPL DL<=0.005 MIU/L-ACNC: 2.58 UIU/ML (ref 0.4–4)

## 2023-02-16 PROCEDURE — 3077F PR MOST RECENT SYSTOLIC BLOOD PRESSURE >= 140 MM HG: ICD-10-PCS | Mod: CPTII,,, | Performed by: STUDENT IN AN ORGANIZED HEALTH CARE EDUCATION/TRAINING PROGRAM

## 2023-02-16 PROCEDURE — 3008F BODY MASS INDEX DOCD: CPT | Mod: CPTII,,, | Performed by: STUDENT IN AN ORGANIZED HEALTH CARE EDUCATION/TRAINING PROGRAM

## 2023-02-16 PROCEDURE — 99999 PR PBB SHADOW E&M-EST. PATIENT-LVL V: ICD-10-PCS | Mod: PBBFAC,,, | Performed by: STUDENT IN AN ORGANIZED HEALTH CARE EDUCATION/TRAINING PROGRAM

## 2023-02-16 PROCEDURE — 99204 PR OFFICE/OUTPT VISIT, NEW, LEVL IV, 45-59 MIN: ICD-10-PCS | Mod: S$PBB,,, | Performed by: STUDENT IN AN ORGANIZED HEALTH CARE EDUCATION/TRAINING PROGRAM

## 2023-02-16 PROCEDURE — 4010F PR ACE/ARB THEARPY RXD/TAKEN: ICD-10-PCS | Mod: CPTII,,, | Performed by: STUDENT IN AN ORGANIZED HEALTH CARE EDUCATION/TRAINING PROGRAM

## 2023-02-16 PROCEDURE — 1159F MED LIST DOCD IN RCRD: CPT | Mod: CPTII,,, | Performed by: STUDENT IN AN ORGANIZED HEALTH CARE EDUCATION/TRAINING PROGRAM

## 2023-02-16 PROCEDURE — 4010F ACE/ARB THERAPY RXD/TAKEN: CPT | Mod: CPTII,,, | Performed by: STUDENT IN AN ORGANIZED HEALTH CARE EDUCATION/TRAINING PROGRAM

## 2023-02-16 PROCEDURE — 99204 OFFICE O/P NEW MOD 45 MIN: CPT | Mod: S$PBB,,, | Performed by: STUDENT IN AN ORGANIZED HEALTH CARE EDUCATION/TRAINING PROGRAM

## 2023-02-16 PROCEDURE — 99215 OFFICE O/P EST HI 40 MIN: CPT | Mod: PBBFAC,PN | Performed by: STUDENT IN AN ORGANIZED HEALTH CARE EDUCATION/TRAINING PROGRAM

## 2023-02-16 PROCEDURE — 1159F PR MEDICATION LIST DOCUMENTED IN MEDICAL RECORD: ICD-10-PCS | Mod: CPTII,,, | Performed by: STUDENT IN AN ORGANIZED HEALTH CARE EDUCATION/TRAINING PROGRAM

## 2023-02-16 PROCEDURE — 3008F PR BODY MASS INDEX (BMI) DOCUMENTED: ICD-10-PCS | Mod: CPTII,,, | Performed by: STUDENT IN AN ORGANIZED HEALTH CARE EDUCATION/TRAINING PROGRAM

## 2023-02-16 PROCEDURE — 3077F SYST BP >= 140 MM HG: CPT | Mod: CPTII,,, | Performed by: STUDENT IN AN ORGANIZED HEALTH CARE EDUCATION/TRAINING PROGRAM

## 2023-02-16 PROCEDURE — 99999 PR PBB SHADOW E&M-EST. PATIENT-LVL V: CPT | Mod: PBBFAC,,, | Performed by: STUDENT IN AN ORGANIZED HEALTH CARE EDUCATION/TRAINING PROGRAM

## 2023-02-16 PROCEDURE — 80061 LIPID PANEL: CPT | Performed by: STUDENT IN AN ORGANIZED HEALTH CARE EDUCATION/TRAINING PROGRAM

## 2023-02-16 PROCEDURE — 84439 ASSAY OF FREE THYROXINE: CPT | Performed by: STUDENT IN AN ORGANIZED HEALTH CARE EDUCATION/TRAINING PROGRAM

## 2023-02-16 PROCEDURE — 80053 COMPREHEN METABOLIC PANEL: CPT | Performed by: STUDENT IN AN ORGANIZED HEALTH CARE EDUCATION/TRAINING PROGRAM

## 2023-02-16 PROCEDURE — 3080F PR MOST RECENT DIASTOLIC BLOOD PRESSURE >= 90 MM HG: ICD-10-PCS | Mod: CPTII,,, | Performed by: STUDENT IN AN ORGANIZED HEALTH CARE EDUCATION/TRAINING PROGRAM

## 2023-02-16 PROCEDURE — 84443 ASSAY THYROID STIM HORMONE: CPT | Performed by: STUDENT IN AN ORGANIZED HEALTH CARE EDUCATION/TRAINING PROGRAM

## 2023-02-16 PROCEDURE — 3080F DIAST BP >= 90 MM HG: CPT | Mod: CPTII,,, | Performed by: STUDENT IN AN ORGANIZED HEALTH CARE EDUCATION/TRAINING PROGRAM

## 2023-02-16 RX ORDER — VENLAFAXINE HYDROCHLORIDE 150 MG/1
150 CAPSULE, EXTENDED RELEASE ORAL DAILY
Qty: 30 CAPSULE | Refills: 11 | Status: SHIPPED | OUTPATIENT
Start: 2023-02-16 | End: 2023-12-12

## 2023-02-16 RX ORDER — HYDROCHLOROTHIAZIDE 12.5 MG/1
12.5 TABLET ORAL DAILY
Qty: 30 TABLET | Refills: 2 | Status: SHIPPED | OUTPATIENT
Start: 2023-02-16 | End: 2023-11-21

## 2023-02-16 RX ORDER — GALCANEZUMAB 120 MG/ML
INJECTION, SOLUTION SUBCUTANEOUS
COMMUNITY
Start: 2023-01-24

## 2023-02-16 RX ORDER — PROPRANOLOL HYDROCHLORIDE 20 MG/1
20 TABLET ORAL DAILY PRN
COMMUNITY
Start: 2023-02-03 | End: 2023-02-16 | Stop reason: SDUPTHER

## 2023-02-16 RX ORDER — LOSARTAN POTASSIUM 100 MG/1
100 TABLET ORAL DAILY
Qty: 30 TABLET | Refills: 2 | Status: SHIPPED | OUTPATIENT
Start: 2023-02-16 | End: 2023-09-26

## 2023-02-16 RX ORDER — ROSUVASTATIN CALCIUM 40 MG/1
TABLET, COATED ORAL
COMMUNITY
End: 2023-02-16 | Stop reason: SDUPTHER

## 2023-02-16 RX ORDER — HYDROCHLOROTHIAZIDE 12.5 MG/1
12.5 TABLET ORAL
COMMUNITY
Start: 2023-02-03 | End: 2023-02-16 | Stop reason: SDUPTHER

## 2023-02-16 RX ORDER — VENLAFAXINE HYDROCHLORIDE 225 MG/1
1 TABLET, EXTENDED RELEASE ORAL EVERY MORNING
COMMUNITY
Start: 2023-02-03 | End: 2023-02-16

## 2023-02-16 RX ORDER — SUMATRIPTAN SUCCINATE 3 MG/1
INJECTION, SOLUTION SUBCUTANEOUS
COMMUNITY
Start: 2023-02-03

## 2023-02-16 RX ORDER — ROSUVASTATIN CALCIUM 40 MG/1
40 TABLET, COATED ORAL DAILY
Qty: 30 TABLET | Refills: 5 | Status: SHIPPED | OUTPATIENT
Start: 2023-02-16 | End: 2023-03-02

## 2023-02-16 RX ORDER — PROPRANOLOL HYDROCHLORIDE 20 MG/1
20 TABLET ORAL DAILY
Qty: 30 TABLET | Refills: 2 | Status: SHIPPED | OUTPATIENT
Start: 2023-02-16 | End: 2024-03-27

## 2023-02-16 RX ORDER — LEVOTHYROXINE SODIUM 88 UG/1
88 TABLET ORAL
COMMUNITY
Start: 2022-12-09 | End: 2023-12-07

## 2023-02-16 RX ORDER — DEXTROMETHORPHAN HYDROBROMIDE, GUAIFENESIN 5; 100 MG/5ML; MG/5ML
650 LIQUID ORAL EVERY 8 HOURS
Qty: 42 TABLET | Refills: 1 | Status: SHIPPED | OUTPATIENT
Start: 2023-02-16 | End: 2023-03-02

## 2023-02-16 RX ORDER — LOSARTAN POTASSIUM 100 MG/1
100 TABLET ORAL
COMMUNITY
Start: 2023-02-03 | End: 2023-02-16 | Stop reason: SDUPTHER

## 2023-02-16 RX ORDER — AMITRIPTYLINE HYDROCHLORIDE 75 MG/1
75 TABLET ORAL NIGHTLY
COMMUNITY
Start: 2023-02-03

## 2023-02-16 RX ORDER — BACLOFEN 10 MG/1
10 TABLET ORAL 2 TIMES DAILY
COMMUNITY
Start: 2023-02-03

## 2023-02-16 SDOH — SOCIAL DETERMINANTS OF HEALTH (SDOH): PROBLEM RELATED TO HOUSING AND ECONOMIC CIRCUMSTANCES, UNSPECIFIED: Z59.9

## 2023-02-16 NOTE — ASSESSMENT & PLAN NOTE
Per patient stable, denies new symptoms at this time. Currently taking Humira and azathioprine.  - follow up with GI in 3 months (May/June 2023)

## 2023-02-16 NOTE — ASSESSMENT & PLAN NOTE
Left hand dominant. ROM in left reduced compared to left, passive ROM intact bilaterally. Encourage daily physical activity as tolerated.  - heating pad application to site of pain, 15-20 minutes 2-3 times daily for pain relief and promote circulation  - when pain well controlled, then keep joints mobile  - massage the tender areas as tolerated, stretch, wrist flexion and extension/RD/UD isometrics as demonstrated  - avoid all activities that are provocative or painful to area of tenderness  - strengthen muscles surrounding to protect the joint space   - provided shoulder exercises to incorporate  - f/u 4 weeks

## 2023-02-16 NOTE — ASSESSMENT & PLAN NOTE
Patient has persistent depression which is moderate and is currently controlled. Will adjust anti-depressant medications. Patient does not display psychosis at this time. Continue to monitor closely and adjust plan of care as needed.  - reduce venlafaxine 150 mg daily  - continue amitriptyline 75 mg daily  - practice good sleep hygiene as discussed  - follow-up 4 weeks

## 2023-02-16 NOTE — PROGRESS NOTES
[facility] Note    HPI:  Sadaf Dhaliwal is a 42 y.o. female who presents today for Establish Care    Shoulder Pain  Patient complains of left shoulder pain. The symptoms began about a month ago. Aggravating factors: repetitive activity, denies any trauma or fall to shoulder. Pain is located around the acromioclavicular (AC) joint. Discomfort is described as aching, sharp/stabbing, and throbbing. Symptoms are exacerbated by repetitive movements, overhead movements, and lying on the shoulder. Evaluation to date: none. Therapy to date includes: nothing specific.  Migraine headaches are currently managed and controlled with monthly injectable medications per neurology specialist.   Denies fever, chills, excessive headache, vision changes, eye pain ear pain, neck pain, chest pain, palpitation, shortness of breath, nausea, vomiting.    ROS   A review of systems was performed and was negative except as noted above.    I personally reviewed allergies, past medical, surgical, social and family history and updated as appropriate.    Medications:    Current Outpatient Medications:     adalimumab (HUMIRA PEN CROHNS-UC-HS START) 40 mg/0.8 mL PnKt, , Disp: , Rfl:     amitriptyline (ELAVIL) 75 MG tablet, Take 75 mg by mouth every evening., Disp: , Rfl:     azaTHIOprine (IMURAN) 50 mg Tab, , Disp: , Rfl:     baclofen (LIORESAL) 10 MG tablet, Take 10 mg by mouth 2 (two) times daily., Disp: , Rfl:     EMGALITY  mg/mL PnIj, Inject into the skin., Disp: , Rfl:     levothyroxine (SYNTHROID) 88 MCG tablet, Take 88 mcg by mouth., Disp: , Rfl:     ondansetron (ZOFRAN) 8 MG tablet, Take 1 tablet (8 mg total) by mouth every 8 (eight) hours as needed for Nausea., Disp: 60 tablet, Rfl: 2    valACYclovir (VALTREX) 500 MG tablet, Take 500 mg by mouth 2 (two) times daily., Disp: , Rfl:     ZEMBRACE SYMTOUCH 3 mg/0.5 mL PnIj, Inject into the skin., Disp: , Rfl:     acetaminophen (TYLENOL) 650 MG TbSR, Take 1 tablet  (650 mg total) by mouth every 8 (eight) hours. for 14 days, Disp: 42 tablet, Rfl: 1    albuterol (PROVENTIL/VENTOLIN HFA) 90 mcg/actuation inhaler, Inhale 2 puffs into the lungs every 6 (six) hours as needed for Wheezing. Rescue (Patient not taking: Reported on 2/16/2023), Disp: 6.7 g, Rfl: 0    cetirizine (ZYRTEC) 10 MG tablet, Take 10 mg by mouth once daily., Disp: , Rfl:     hydroCHLOROthiazide (HYDRODIURIL) 12.5 MG Tab, Take 1 tablet (12.5 mg total) by mouth once daily., Disp: 30 tablet, Rfl: 2    levocetirizine (XYZAL) 5 MG tablet, Take 5 mg by mouth every evening., Disp: , Rfl:     losartan (COZAAR) 100 MG tablet, Take 1 tablet (100 mg total) by mouth once daily., Disp: 30 tablet, Rfl: 2    propranoloL (INDERAL) 20 MG tablet, Take 1 tablet (20 mg total) by mouth once daily., Disp: 30 tablet, Rfl: 2    rosuvastatin (CRESTOR) 40 MG Tab, Take 1 tablet (40 mg total) by mouth once daily., Disp: 30 tablet, Rfl: 5    venlafaxine (EFFEXOR-XR) 150 MG Cp24, Take 1 capsule (150 mg total) by mouth once daily., Disp: 30 capsule, Rfl: 11     Health Maintenance:    There is no immunization history on file for this patient.   Health Maintenance   Topic Date Due    Lipid Panel  Never done    TETANUS VACCINE  Never done    Mammogram  05/13/2022    Hepatitis C Screening  Completed     Health Maintenance Topics with due status: Not Due       Topic Last Completion Date    Hemoglobin A1c (Diabetic Prevention Screening) 06/24/2021     Health Maintenance Due   Topic Date Due    Cervical Cancer Screening  Never done    Lipid Panel  Never done    Pneumococcal Vaccines (Age 0-64) (1 - PCV) Never done    HIV Screening  Never done    TETANUS VACCINE  Never done    Mammogram  05/13/2022     PHYSICAL EXAM:  Vitals:    02/16/23 1314 02/16/23 1343   BP: (!) 157/89 (!) 175/100   BP Location: Right arm    Patient Position: Sitting    BP Method: Large (Automatic)    Pulse: 90    Resp: 14    Temp: 98.9 °F (37.2 °C)    TempSrc: Oral    SpO2: 98%   "  Weight: 88.5 kg (195 lb)    Height: 5' 3" (1.6 m)      Body mass index is 34.54 kg/m².  Physical Exam  Vitals reviewed.   Constitutional:       General: She is not in acute distress.     Appearance: She is normal weight. She is not ill-appearing or toxic-appearing.      Comments: Poor hygiene   HENT:      Head: Normocephalic and atraumatic.      Right Ear: External ear normal.      Left Ear: External ear normal.      Nose: Nose normal.      Mouth/Throat:      Mouth: Mucous membranes are moist.      Pharynx: Oropharynx is clear.   Eyes:      Extraocular Movements: Extraocular movements intact.      Conjunctiva/sclera: Conjunctivae normal.   Cardiovascular:      Rate and Rhythm: Normal rate and regular rhythm.      Pulses: Normal pulses.      Heart sounds: Normal heart sounds.   Pulmonary:      Effort: Pulmonary effort is normal. No respiratory distress.      Breath sounds: No stridor. No wheezing, rhonchi or rales.   Abdominal:      General: Abdomen is flat. Bowel sounds are normal.      Palpations: Abdomen is soft.      Tenderness: There is no right CVA tenderness or left CVA tenderness.   Musculoskeletal:         General: No tenderness. Normal range of motion.      Cervical back: Normal range of motion and neck supple. No rigidity or tenderness.      Right lower leg: No edema.      Left lower leg: No edema.   Skin:     General: Skin is warm and dry.      Capillary Refill: Capillary refill takes less than 2 seconds.      Findings: No erythema or rash.   Neurological:      General: No focal deficit present.      Mental Status: She is alert and oriented to person, place, and time. Mental status is at baseline.      Motor: No weakness.      Gait: Gait normal.   Psychiatric:         Mood and Affect: Mood normal.         Behavior: Behavior normal.         Thought Content: Thought content normal.         Judgment: Judgment normal.      ASSESSMENT/PLAN:  1. Primary hypertension  -     losartan (COZAAR) 100 MG tablet; " Take 1 tablet (100 mg total) by mouth once daily.  Dispense: 30 tablet; Refill: 2  -     hydroCHLOROthiazide (HYDRODIURIL) 12.5 MG Tab; Take 1 tablet (12.5 mg total) by mouth once daily.  Dispense: 30 tablet; Refill: 2  -     propranoloL (INDERAL) 20 MG tablet; Take 1 tablet (20 mg total) by mouth once daily.  Dispense: 30 tablet; Refill: 2  -     Comprehensive Metabolic Panel; Future; Expected date: 02/16/2023    2. Encounter for screening for malignant neoplasm of breast, unspecified screening modality  -     Mammo Digital Screening Bilat w/ [place]; Future; Expected date: 02/16/2023    3. Hypothyroidism, unspecified type  -     TSH; Future; Expected date: 02/16/2023  -     T4, Free; Future; Expected date: 02/16/2023    4. Hyperlipidemia, unspecified hyperlipidemia type  Assessment & Plan:  Continue pravastatin 40 mg daily  Follow-up lipid panel      Orders:  -     Lipid Panel; Future; Expected date: 02/16/2023  -     rosuvastatin (CRESTOR) 40 MG Tab; Take 1 tablet (40 mg total) by mouth once daily.  Dispense: 30 tablet; Refill: 5    5. Acute pain of left shoulder  Assessment & Plan:  Left hand dominant. ROM in left reduced compared to left, passive ROM intact bilaterally. Encourage daily physical activity as tolerated.  - heating pad application to site of pain, 15-20 minutes 2-3 times daily for pain relief and promote circulation  - when pain well controlled, then keep joints mobile  - massage the tender areas as tolerated, stretch, wrist flexion and extension/RD/UD isometrics as demonstrated  - avoid all activities that are provocative or painful to area of tenderness  - strengthen muscles surrounding to protect the joint space   - provided shoulder exercises to incorporate  - f/u 4 weeks      Orders:  -     acetaminophen (TYLENOL) 650 MG TbSR; Take 1 tablet (650 mg total) by mouth every 8 (eight) hours. for 14 days  Dispense: 42 tablet; Refill: 1    6. Anxiety and depression  Overview:  In the past took  -  Deysrel, Paxil, and Buspar  Now taking daily,   - Amitriptyline 75 mg  - Venlafaxine 225 mg        Assessment & Plan:  Patient has persistent depression which is moderate and is currently controlled. Will adjust anti-depressant medications. Patient does not display psychosis at this time. Continue to monitor closely and adjust plan of care as needed.  - reduce venlafaxine 150 mg daily  - continue amitriptyline 75 mg daily  - practice good sleep hygiene as discussed  - follow-up 4 weeks          7. Recurrent mild major depressive disorder with anxiety  Overview:  In the past took  - Deysrel, Paxil, and Buspar  Now taking daily,   - Amitriptyline 75 mg  - Venlafaxine 225 mg        Assessment & Plan:  Patient has persistent depression which is moderate and is currently controlled. Will adjust anti-depressant medications. Patient does not display psychosis at this time. Continue to monitor closely and adjust plan of care as needed.  - reduce venlafaxine 150 mg daily  - continue amitriptyline 75 mg daily  - practice good sleep hygiene as discussed  - follow-up 4 weeks        Orders:  -     venlafaxine (EFFEXOR-XR) 150 MG Cp24; Take 1 capsule (150 mg total) by mouth once daily.  Dispense: 30 capsule; Refill: 11    8. Crohn's disease of large intestine with abscess  Assessment & Plan:  Per patient stable, denies new symptoms at this time. Currently taking Humira and azathioprine.  - follow up with GI in 3 months (May/June 2023)           9. Elevated LFTs  Assessment & Plan:  Follow up LFTs.         10. Financial difficulties  -     Ambulatory referral/consult to Social Work; Future; Expected date: 02/23/2023    11. Class 1 obesity with serious comorbidity and body mass index (BMI) of 34.0 to 34.9 in adult, unspecified obesity type  Assessment & Plan:  Body mass index is 34.54 kg/m². Morbid obesity complicates all aspects of disease management from diagnostic modalities to treatment. Weight loss encouraged and health  benefits explained to patient.               Other than changes above, continue current medications and maintain follow up with specialists.      Follow up in about 3 months (around 5/16/2023) for BP check, Med recheck.   No results found for this or any previous visit (from the past 2016 hour(s)).      Kazumi G Yoshinaga, DO [facility]

## 2023-03-02 ENCOUNTER — OFFICE VISIT (OUTPATIENT)
Dept: PRIMARY CARE CLINIC | Facility: CLINIC | Age: 43
End: 2023-03-02
Payer: MEDICAID

## 2023-03-02 VITALS
SYSTOLIC BLOOD PRESSURE: 132 MMHG | OXYGEN SATURATION: 95 % | BODY MASS INDEX: 34.91 KG/M2 | WEIGHT: 197 LBS | HEIGHT: 63 IN | TEMPERATURE: 98 F | HEART RATE: 85 BPM | RESPIRATION RATE: 15 BRPM | DIASTOLIC BLOOD PRESSURE: 70 MMHG
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
## 2023-03-02 DIAGNOSIS — K04.7 TOOTH ABSCESS: ICD-10-CM

## 2023-03-02 DIAGNOSIS — E66.9 CLASS 1 OBESITY WITH SERIOUS COMORBIDITY AND BODY MASS INDEX (BMI) OF 34.0 TO 34.9 IN ADULT, UNSPECIFIED OBESITY TYPE: ICD-10-CM

## 2023-03-02 DIAGNOSIS — K50.114 CROHN'S DISEASE OF LARGE INTESTINE WITH ABSCESS: ICD-10-CM

## 2023-03-02 DIAGNOSIS — E78.5 HYPERLIPIDEMIA, UNSPECIFIED HYPERLIPIDEMIA TYPE: ICD-10-CM

## 2023-03-02 DIAGNOSIS — K50.10 CROHN'S DISEASE OF COLON WITHOUT COMPLICATION: ICD-10-CM

## 2023-03-02 DIAGNOSIS — E78.01 FAMILIAL HYPERCHOLESTEROLEMIA: ICD-10-CM

## 2023-03-02 DIAGNOSIS — I10 PRIMARY HYPERTENSION: Primary | ICD-10-CM

## 2023-03-02 DIAGNOSIS — E03.9 HYPOTHYROIDISM, UNSPECIFIED TYPE: ICD-10-CM

## 2023-03-02 PROCEDURE — 90715 TDAP VACCINE 7 YRS/> IM: CPT | Mod: PBBFAC,PN

## 2023-03-02 PROCEDURE — 99999 PR PBB SHADOW E&M-EST. PATIENT-LVL V: CPT | Mod: PBBFAC,,, | Performed by: STUDENT IN AN ORGANIZED HEALTH CARE EDUCATION/TRAINING PROGRAM

## 2023-03-02 PROCEDURE — 99214 OFFICE O/P EST MOD 30 MIN: CPT | Mod: S$PBB,,, | Performed by: STUDENT IN AN ORGANIZED HEALTH CARE EDUCATION/TRAINING PROGRAM

## 2023-03-02 PROCEDURE — 99999 PR PBB SHADOW E&M-EST. PATIENT-LVL V: ICD-10-PCS | Mod: PBBFAC,,, | Performed by: STUDENT IN AN ORGANIZED HEALTH CARE EDUCATION/TRAINING PROGRAM

## 2023-03-02 PROCEDURE — 3078F PR MOST RECENT DIASTOLIC BLOOD PRESSURE < 80 MM HG: ICD-10-PCS | Mod: CPTII,,, | Performed by: STUDENT IN AN ORGANIZED HEALTH CARE EDUCATION/TRAINING PROGRAM

## 2023-03-02 PROCEDURE — 1159F MED LIST DOCD IN RCRD: CPT | Mod: CPTII,,, | Performed by: STUDENT IN AN ORGANIZED HEALTH CARE EDUCATION/TRAINING PROGRAM

## 2023-03-02 PROCEDURE — 3075F PR MOST RECENT SYSTOLIC BLOOD PRESS GE 130-139MM HG: ICD-10-PCS | Mod: CPTII,,, | Performed by: STUDENT IN AN ORGANIZED HEALTH CARE EDUCATION/TRAINING PROGRAM

## 2023-03-02 PROCEDURE — 90471 IMMUNIZATION ADMIN: CPT | Mod: PBBFAC,PN

## 2023-03-02 PROCEDURE — 99215 OFFICE O/P EST HI 40 MIN: CPT | Mod: PBBFAC,PN | Performed by: STUDENT IN AN ORGANIZED HEALTH CARE EDUCATION/TRAINING PROGRAM

## 2023-03-02 PROCEDURE — 99214 PR OFFICE/OUTPT VISIT, EST, LEVL IV, 30-39 MIN: ICD-10-PCS | Mod: S$PBB,,, | Performed by: STUDENT IN AN ORGANIZED HEALTH CARE EDUCATION/TRAINING PROGRAM

## 2023-03-02 PROCEDURE — 3075F SYST BP GE 130 - 139MM HG: CPT | Mod: CPTII,,, | Performed by: STUDENT IN AN ORGANIZED HEALTH CARE EDUCATION/TRAINING PROGRAM

## 2023-03-02 PROCEDURE — 3078F DIAST BP <80 MM HG: CPT | Mod: CPTII,,, | Performed by: STUDENT IN AN ORGANIZED HEALTH CARE EDUCATION/TRAINING PROGRAM

## 2023-03-02 PROCEDURE — 4010F ACE/ARB THERAPY RXD/TAKEN: CPT | Mod: CPTII,,, | Performed by: STUDENT IN AN ORGANIZED HEALTH CARE EDUCATION/TRAINING PROGRAM

## 2023-03-02 PROCEDURE — 1160F PR REVIEW ALL MEDS BY PRESCRIBER/CLIN PHARMACIST DOCUMENTED: ICD-10-PCS | Mod: CPTII,,, | Performed by: STUDENT IN AN ORGANIZED HEALTH CARE EDUCATION/TRAINING PROGRAM

## 2023-03-02 PROCEDURE — 4010F PR ACE/ARB THEARPY RXD/TAKEN: ICD-10-PCS | Mod: CPTII,,, | Performed by: STUDENT IN AN ORGANIZED HEALTH CARE EDUCATION/TRAINING PROGRAM

## 2023-03-02 PROCEDURE — 1160F RVW MEDS BY RX/DR IN RCRD: CPT | Mod: CPTII,,, | Performed by: STUDENT IN AN ORGANIZED HEALTH CARE EDUCATION/TRAINING PROGRAM

## 2023-03-02 PROCEDURE — 3008F PR BODY MASS INDEX (BMI) DOCUMENTED: ICD-10-PCS | Mod: CPTII,,, | Performed by: STUDENT IN AN ORGANIZED HEALTH CARE EDUCATION/TRAINING PROGRAM

## 2023-03-02 PROCEDURE — 3008F BODY MASS INDEX DOCD: CPT | Mod: CPTII,,, | Performed by: STUDENT IN AN ORGANIZED HEALTH CARE EDUCATION/TRAINING PROGRAM

## 2023-03-02 PROCEDURE — 1159F PR MEDICATION LIST DOCUMENTED IN MEDICAL RECORD: ICD-10-PCS | Mod: CPTII,,, | Performed by: STUDENT IN AN ORGANIZED HEALTH CARE EDUCATION/TRAINING PROGRAM

## 2023-03-02 RX ORDER — AMOXICILLIN AND CLAVULANATE POTASSIUM 875; 125 MG/1; MG/1
1 TABLET, FILM COATED ORAL EVERY 12 HOURS
Qty: 20 TABLET | Refills: 0 | Status: SHIPPED | OUTPATIENT
Start: 2023-03-02 | End: 2023-03-12

## 2023-03-02 RX ORDER — ROSUVASTATIN CALCIUM 40 MG/1
40 TABLET, COATED ORAL DAILY
Qty: 90 TABLET | Refills: 3 | Status: SHIPPED | OUTPATIENT
Start: 2023-03-02 | End: 2023-03-30 | Stop reason: SDUPTHER

## 2023-03-02 RX ORDER — CHLORHEXIDINE GLUCONATE ORAL RINSE 1.2 MG/ML
15 SOLUTION DENTAL 2 TIMES DAILY
Qty: 473 ML | Refills: 0 | Status: SHIPPED | OUTPATIENT
Start: 2023-03-02 | End: 2023-03-16

## 2023-03-02 NOTE — PROGRESS NOTES
[facility] Note    HPI:  Sadaf Dhaliwal is a 42 y.o. female who presents today for Follow-up (2 week follow up)  This past week, patient was bit by a dog, puncture wounds over right dorsum of hand, swelling and erythema over wound, no bleeding, or purulent drainage, ROM diminished.   Patient denies fever, chills, excessive headaches, vision changes, chest pain, palpitations, shortness of breath, excessive abdominal pain, changes in bowel habits, nausea, vomiting, constipation. Denies bloody diarrhea, dysuria.     ROS   A review of systems was performed and was negative except as noted above.    I personally reviewed allergies, past medical, surgical, social and family history and updated as appropriate.    Medications:    Current Outpatient Medications:     adalimumab (HUMIRA PEN CROHNS-UC-HS START) 40 mg/0.8 mL PnKt, , Disp: , Rfl:     amitriptyline (ELAVIL) 75 MG tablet, Take 75 mg by mouth every evening., Disp: , Rfl:     azaTHIOprine (IMURAN) 50 mg Tab, , Disp: , Rfl:     baclofen (LIORESAL) 10 MG tablet, Take 10 mg by mouth 2 (two) times daily., Disp: , Rfl:     EMGALITY  mg/mL PnIj, Inject into the skin., Disp: , Rfl:     hydroCHLOROthiazide (HYDRODIURIL) 12.5 MG Tab, Take 1 tablet (12.5 mg total) by mouth once daily., Disp: 30 tablet, Rfl: 2    levocetirizine (XYZAL) 5 MG tablet, Take 5 mg by mouth every evening., Disp: , Rfl:     levothyroxine (SYNTHROID) 88 MCG tablet, Take 88 mcg by mouth., Disp: , Rfl:     losartan (COZAAR) 100 MG tablet, Take 1 tablet (100 mg total) by mouth once daily., Disp: 30 tablet, Rfl: 2    ondansetron (ZOFRAN) 8 MG tablet, Take 1 tablet (8 mg total) by mouth every 8 (eight) hours as needed for Nausea., Disp: 60 tablet, Rfl: 2    propranoloL (INDERAL) 20 MG tablet, Take 1 tablet (20 mg total) by mouth once daily., Disp: 30 tablet, Rfl: 2    valACYclovir (VALTREX) 500 MG tablet, Take 500 mg by mouth 2 (two) times daily., Disp: , Rfl:     venlafaxine  "(EFFEXOR-XR) 150 MG Cp24, Take 1 capsule (150 mg total) by mouth once daily., Disp: 30 capsule, Rfl: 11    ZEMBRACE SYMTOUCH 3 mg/0.5 mL PnIj, Inject into the skin., Disp: , Rfl:     amoxicillin-clavulanate 875-125mg (AUGMENTIN) 875-125 mg per tablet, Take 1 tablet by mouth every 12 (twelve) hours. for 10 days, Disp: 20 tablet, Rfl: 0    chlorhexidine (PERIDEX) 0.12 % solution, Use as directed 15 mLs in the mouth or throat 2 (two) times daily. for 14 days, Disp: 473 mL, Rfl: 0    Lactobacillus acidoph-L.bulgar 1 million cell Chew, Take 4 tablets by mouth 3 (three) times daily with meals., Disp: 360 tablet, Rfl: 1    rosuvastatin (CRESTOR) 40 MG Tab, Take 1 tablet (40 mg total) by mouth once daily., Disp: 90 tablet, Rfl: 3     Health Maintenance:  Immunization History   Administered Date(s) Administered    Tdap 03/02/2023      Health Maintenance   Topic Date Due    Mammogram  05/13/2022    TETANUS VACCINE  03/02/2033    Hepatitis C Screening  Completed    Lipid Panel  Completed     Health Maintenance Topics with due status: Not Due       Topic Last Completion Date    Hemoglobin A1c (Diabetic Prevention Screening) 07/14/2020    TETANUS VACCINE 03/02/2023     Health Maintenance Due   Topic Date Due    Cervical Cancer Screening  Never done    Pneumococcal Vaccines (Age 0-64) (1 - PCV) Never done    HIV Screening  Never done    Mammogram  05/13/2022       PHYSICAL EXAM:  Vitals:    03/02/23 1405   BP: 132/70   BP Location: Right arm   Patient Position: Sitting   BP Method: Large (Automatic)   Pulse: 85   Resp: 15   Temp: 98.4 °F (36.9 °C)   TempSrc: Oral   SpO2: 95%   Weight: 89.4 kg (197 lb)   Height: 5' 3" (1.6 m)     Body mass index is 34.9 kg/m².  Physical Exam  Vitals reviewed.   Constitutional:       General: She is not in acute distress.     Appearance: Normal appearance. She is normal weight. She is not ill-appearing or toxic-appearing.      Comments: Poor hygiene   HENT:      Head: Normocephalic and atraumatic. "      Right Ear: External ear normal.      Left Ear: External ear normal.      Nose: Nose normal.      Mouth/Throat:      Pharynx: Oropharynx is clear.      Comments: Poor dentition, multiple caries, left molar chipped tooth, pulp exposed, gum swelling  Eyes:      Extraocular Movements: Extraocular movements intact.      Conjunctiva/sclera: Conjunctivae normal.   Cardiovascular:      Rate and Rhythm: Normal rate and regular rhythm.      Pulses: Normal pulses.      Heart sounds: Normal heart sounds.   Pulmonary:      Effort: Pulmonary effort is normal. No respiratory distress.      Breath sounds: No stridor. No wheezing, rhonchi or rales.   Abdominal:      General: Abdomen is flat. There is no distension.      Palpations: Abdomen is soft. There is no mass.      Tenderness: There is no abdominal tenderness. There is no right CVA tenderness, left CVA tenderness or guarding.   Musculoskeletal:         General: No swelling or tenderness. Normal range of motion.      Cervical back: Normal range of motion and neck supple. No rigidity or tenderness.      Right lower leg: No edema.      Left lower leg: No edema.   Lymphadenopathy:      Cervical: No cervical adenopathy.   Skin:     General: Skin is warm and dry.      Capillary Refill: Capillary refill takes less than 2 seconds.      Coloration: Skin is not pale.      Findings: Erythema and lesion present. No bruising or rash.      Comments: Right hand two puncture wounds scabbed, no bleeding, no purulent drainage. Mild tenderness over wound site, erythema localized to wound sites.    Neurological:      General: No focal deficit present.      Mental Status: She is alert and oriented to person, place, and time. Mental status is at baseline.      Motor: No weakness.      Gait: Gait normal.   Psychiatric:         Mood and Affect: Mood normal.         Behavior: Behavior normal.         Thought Content: Thought content normal.         Judgment: Judgment normal.       ASSESSMENT/PLAN:  1. Primary hypertension  Overview:  - losartan 100 mg daily  - HCTZ 12.5 mg daily  - propranolol 20 mg daily  - low sodium diet <2 g or 2 teaspoons daily  - increase daily whole foods, vegetables, fruits, meats in diet    Assessment & Plan:  Normotensive. Patient back on track taking BP meds regularly.   - continue with current regimen    Orders:  -     Hypertension Digital Medicine (HDMP) Enrollment Order  -     Hypertension Digital Medicine (HDMP): Assign Onboarding Questionnaires    2. Hyperlipidemia, unspecified hyperlipidemia type  -     rosuvastatin (CRESTOR) 40 MG Tab; Take 1 tablet (40 mg total) by mouth once daily.  Dispense: 90 tablet; Refill: 3  -     Lipids Digital Medicine (LDMP) Enrollment Order  -     Lipids Digital Medicine (LDMP): Assign Onboarding Questionnaires  -     Ambulatory referral/consult to Cardiology; Future; Expected date: 03/09/2023  -     Ambulatory referral/consult to Genetics; Future; Expected date: 03/09/2023    3. Familial hypercholesterolemia  Assessment & Plan:  Follow up referral to specialist. Patient with strong family history of CAD.       4. Dog bite, initial encounter  -     (In Office Administered) Tdap Vaccine  -     amoxicillin-clavulanate 875-125mg (AUGMENTIN) 875-125 mg per tablet; Take 1 tablet by mouth every 12 (twelve) hours. for 10 days  Dispense: 20 tablet; Refill: 0  -     Lactobacillus acidoph-L.bulgar 1 million cell Chew; Take 4 tablets by mouth 3 (three) times daily with meals.  Dispense: 360 tablet; Refill: 1    5. Tooth abscess  -     amoxicillin-clavulanate 875-125mg (AUGMENTIN) 875-125 mg per tablet; Take 1 tablet by mouth every 12 (twelve) hours. for 10 days  Dispense: 20 tablet; Refill: 0  -     chlorhexidine (PERIDEX) 0.12 % solution; Use as directed 15 mLs in the mouth or throat 2 (two) times daily. for 14 days  Dispense: 473 mL; Refill: 0  -     Lactobacillus acidoph-L.bulgar 1 million cell Chew; Take 4 tablets by mouth 3 (three)  times daily with meals.  Dispense: 360 tablet; Refill: 1    6. Crohn's disease of colon without complication    7. Class 1 obesity with serious comorbidity and body mass index (BMI) of 34.0 to 34.9 in adult, unspecified obesity type  Overview:  Wt Readings from Last 8 Encounters:   03/02/23 89.4 kg (197 lb)   02/16/23 88.5 kg (195 lb)   10/12/22 88.9 kg (196 lb)   07/27/22 88.9 kg (196 lb)   02/10/22 96.2 kg (212 lb)   10/26/21 93.9 kg (207 lb)   08/19/21 92.5 kg (204 lb)   08/08/21 92.5 kg (204 lb)   Recommendations:   Stay physically active. As tolerated alternate resistance training with stretching and cardio. Goal of 150 minutes per week of moderate intensity activity or 7,500 - 10,000 steps per day. Follow the Mediterranean Diet. Include whole fresh fruits, vegetables, olive oil, seeds, nuts, whole grains, cold water fish, salmon, mackerel and lean cuts of meat.  Do not drink sugary/diet carbonated beverages. Decrease portion sizes slightly which will result in an approximately 500-calorie deficit. Avoid fast or fried and processed food, especially canned foods. Avoid refined carbohydrates, white starchy foods, flour, white potato, bread, muffins, and cakes. Consider substituting one meal a day with a meal replacement such as Slim fast, lean cuisine, or weight watcher's. Follow a healthy diet that includes enough calcium, vitamin D and proteins for bone health.        8. Hypothyroidism, unspecified type  Assessment & Plan:  Lab Results   Component Value Date    TSH 2.580 02/16/2023    FREET4 0.86 02/16/2023   Thyroid function within normal limits. Continue with current levothyroxine 88 mcg daily.   - f/u 6 months        9. Crohn's disease of large intestine with abscess  Assessment & Plan:  Per patient stable, denies new symptoms at this time. Continue Humira and azathioprine.  - follow up with GI is coming up in May/June 2023             Other than changes above, continue current medications and maintain  follow up with specialists.      Follow up in about 3 months (around 6/2/2023) for Annual, Med recheck.   Recent Results (from the past 2016 hour(s))   Lipid Panel    Collection Time: 02/16/23  2:25 PM   Result Value Ref Range    Cholesterol 284 (H) 120 - 199 mg/dL    Triglycerides 197 (H) 30 - 150 mg/dL    HDL 36 (L) 40 - 75 mg/dL    LDL Cholesterol 208.6 (H) 63.0 - 159.0 mg/dL    HDL/Cholesterol Ratio 12.7 (L) 20.0 - 50.0 %    Total Cholesterol/HDL Ratio 7.9 (H) 2.0 - 5.0    Non-HDL Cholesterol 248 mg/dL   T4, Free    Collection Time: 02/16/23  2:25 PM   Result Value Ref Range    Free T4 0.86 0.71 - 1.51 ng/dL   TSH    Collection Time: 02/16/23  2:25 PM   Result Value Ref Range    TSH 2.580 0.400 - 4.000 uIU/mL   Comprehensive Metabolic Panel    Collection Time: 02/16/23  2:25 PM   Result Value Ref Range    Sodium 141 136 - 145 mmol/L    Potassium 4.0 3.5 - 5.1 mmol/L    Chloride 109 95 - 110 mmol/L    CO2 29 23 - 29 mmol/L    Glucose 73 70 - 110 mg/dL    BUN 8 6 - 20 mg/dL    Creatinine 0.9 0.5 - 1.4 mg/dL    Calcium 9.1 8.7 - 10.5 mg/dL    Total Protein 8.1 6.0 - 8.4 g/dL    Albumin 3.8 3.5 - 5.2 g/dL    Total Bilirubin 0.2 0.1 - 1.0 mg/dL    Alkaline Phosphatase 115 55 - 135 U/L    AST 31 10 - 40 U/L    ALT 39 10 - 44 U/L    Anion Gap 3 (L) 8 - 16 mmol/L    eGFR >60.0 >60 mL/min/1.73 m^2       Kazumi G Yoshinaga, DO [facility]

## 2023-03-02 NOTE — ASSESSMENT & PLAN NOTE
Lab Results   Component Value Date    TSH 2.580 02/16/2023    FREET4 0.86 02/16/2023   Thyroid function within normal limits. Continue with current levothyroxine 88 mcg daily.   - f/u 6 months

## 2023-03-02 NOTE — ASSESSMENT & PLAN NOTE
Per patient stable, denies new symptoms at this time. Continue Humira and azathioprine.  - follow up with GI is coming up in May/June 2023

## 2023-03-14 ENCOUNTER — HOSPITAL ENCOUNTER (OUTPATIENT)
Dept: RADIOLOGY | Facility: HOSPITAL | Age: 43
Discharge: HOME | End: 2023-03-14
Attending: STUDENT IN AN ORGANIZED HEALTH CARE EDUCATION/TRAINING PROGRAM
Payer: MEDICAID

## 2023-03-14 VITALS — HEIGHT: 63 IN | WEIGHT: 197 LBS | BODY MASS INDEX: 34.91 KG/M2

## 2023-03-14 DIAGNOSIS — Z12.39 ENCOUNTER FOR SCREENING FOR MALIGNANT NEOPLASM OF BREAST, UNSPECIFIED SCREENING MODALITY: ICD-10-CM

## 2023-03-14 PROCEDURE — 77067 SCR MAMMO BI INCL CAD: CPT | Mod: TC

## 2023-03-15 ENCOUNTER — PATIENT OUTREACH (OUTPATIENT)
Dept: ADMINISTRATIVE | Facility: HOSPITAL | Age: 43
End: 2023-03-15
Payer: MEDICAID

## 2023-03-16 ENCOUNTER — PATIENT OUTREACH (OUTPATIENT)
Dept: ADMINISTRATIVE | Facility: HOSPITAL | Age: 43
End: 2023-03-16
Payer: MEDICAID

## 2023-03-27 ENCOUNTER — PATIENT OUTREACH (OUTPATIENT)
Dept: ADMINISTRATIVE | Facility: HOSPITAL | Age: 43
End: 2023-03-27
Payer: MEDICAID

## 2023-03-30 ENCOUNTER — OFFICE VISIT (OUTPATIENT)
Dept: PRIMARY CARE CLINIC | Facility: CLINIC | Age: 43
End: 2023-03-30
Payer: MEDICAID

## 2023-03-30 VITALS
RESPIRATION RATE: 16 BRPM | TEMPERATURE: 98 F | WEIGHT: 193 LBS | HEART RATE: 83 BPM | DIASTOLIC BLOOD PRESSURE: 89 MMHG | SYSTOLIC BLOOD PRESSURE: 141 MMHG | HEIGHT: 63 IN | BODY MASS INDEX: 34.2 KG/M2 | OXYGEN SATURATION: 98 %

## 2023-03-30 DIAGNOSIS — R05.8 POST-VIRAL COUGH SYNDROME: Primary | ICD-10-CM

## 2023-03-30 DIAGNOSIS — E78.5 HYPERLIPIDEMIA, UNSPECIFIED HYPERLIPIDEMIA TYPE: ICD-10-CM

## 2023-03-30 PROCEDURE — 3079F PR MOST RECENT DIASTOLIC BLOOD PRESSURE 80-89 MM HG: ICD-10-PCS | Mod: CPTII,,, | Performed by: STUDENT IN AN ORGANIZED HEALTH CARE EDUCATION/TRAINING PROGRAM

## 2023-03-30 PROCEDURE — 3008F BODY MASS INDEX DOCD: CPT | Mod: CPTII,,, | Performed by: STUDENT IN AN ORGANIZED HEALTH CARE EDUCATION/TRAINING PROGRAM

## 2023-03-30 PROCEDURE — 3079F DIAST BP 80-89 MM HG: CPT | Mod: CPTII,,, | Performed by: STUDENT IN AN ORGANIZED HEALTH CARE EDUCATION/TRAINING PROGRAM

## 2023-03-30 PROCEDURE — 99215 OFFICE O/P EST HI 40 MIN: CPT | Mod: PBBFAC,PN | Performed by: STUDENT IN AN ORGANIZED HEALTH CARE EDUCATION/TRAINING PROGRAM

## 2023-03-30 PROCEDURE — 99999 PR PBB SHADOW E&M-EST. PATIENT-LVL V: CPT | Mod: PBBFAC,,, | Performed by: STUDENT IN AN ORGANIZED HEALTH CARE EDUCATION/TRAINING PROGRAM

## 2023-03-30 PROCEDURE — 1160F RVW MEDS BY RX/DR IN RCRD: CPT | Mod: CPTII,,, | Performed by: STUDENT IN AN ORGANIZED HEALTH CARE EDUCATION/TRAINING PROGRAM

## 2023-03-30 PROCEDURE — 3077F PR MOST RECENT SYSTOLIC BLOOD PRESSURE >= 140 MM HG: ICD-10-PCS | Mod: CPTII,,, | Performed by: STUDENT IN AN ORGANIZED HEALTH CARE EDUCATION/TRAINING PROGRAM

## 2023-03-30 PROCEDURE — 99999 PR PBB SHADOW E&M-EST. PATIENT-LVL V: ICD-10-PCS | Mod: PBBFAC,,, | Performed by: STUDENT IN AN ORGANIZED HEALTH CARE EDUCATION/TRAINING PROGRAM

## 2023-03-30 PROCEDURE — 3077F SYST BP >= 140 MM HG: CPT | Mod: CPTII,,, | Performed by: STUDENT IN AN ORGANIZED HEALTH CARE EDUCATION/TRAINING PROGRAM

## 2023-03-30 PROCEDURE — 99213 PR OFFICE/OUTPT VISIT, EST, LEVL III, 20-29 MIN: ICD-10-PCS | Mod: S$PBB,,, | Performed by: STUDENT IN AN ORGANIZED HEALTH CARE EDUCATION/TRAINING PROGRAM

## 2023-03-30 PROCEDURE — 1159F PR MEDICATION LIST DOCUMENTED IN MEDICAL RECORD: ICD-10-PCS | Mod: CPTII,,, | Performed by: STUDENT IN AN ORGANIZED HEALTH CARE EDUCATION/TRAINING PROGRAM

## 2023-03-30 PROCEDURE — 3008F PR BODY MASS INDEX (BMI) DOCUMENTED: ICD-10-PCS | Mod: CPTII,,, | Performed by: STUDENT IN AN ORGANIZED HEALTH CARE EDUCATION/TRAINING PROGRAM

## 2023-03-30 PROCEDURE — 99213 OFFICE O/P EST LOW 20 MIN: CPT | Mod: S$PBB,,, | Performed by: STUDENT IN AN ORGANIZED HEALTH CARE EDUCATION/TRAINING PROGRAM

## 2023-03-30 PROCEDURE — 1160F PR REVIEW ALL MEDS BY PRESCRIBER/CLIN PHARMACIST DOCUMENTED: ICD-10-PCS | Mod: CPTII,,, | Performed by: STUDENT IN AN ORGANIZED HEALTH CARE EDUCATION/TRAINING PROGRAM

## 2023-03-30 PROCEDURE — 4010F PR ACE/ARB THEARPY RXD/TAKEN: ICD-10-PCS | Mod: CPTII,,, | Performed by: STUDENT IN AN ORGANIZED HEALTH CARE EDUCATION/TRAINING PROGRAM

## 2023-03-30 PROCEDURE — 4010F ACE/ARB THERAPY RXD/TAKEN: CPT | Mod: CPTII,,, | Performed by: STUDENT IN AN ORGANIZED HEALTH CARE EDUCATION/TRAINING PROGRAM

## 2023-03-30 PROCEDURE — 1159F MED LIST DOCD IN RCRD: CPT | Mod: CPTII,,, | Performed by: STUDENT IN AN ORGANIZED HEALTH CARE EDUCATION/TRAINING PROGRAM

## 2023-03-30 RX ORDER — ROSUVASTATIN CALCIUM 40 MG/1
40 TABLET, COATED ORAL DAILY
Qty: 90 TABLET | Refills: 3 | Status: SHIPPED | OUTPATIENT
Start: 2023-03-30 | End: 2023-07-24

## 2023-03-30 RX ORDER — PROMETHAZINE HYDROCHLORIDE AND DEXTROMETHORPHAN HYDROBROMIDE 6.25; 15 MG/5ML; MG/5ML
5 SYRUP ORAL EVERY 8 HOURS PRN
Qty: 180 ML | Refills: 0 | Status: SHIPPED | OUTPATIENT
Start: 2023-03-30 | End: 2023-04-09

## 2023-03-30 RX ORDER — FLUTICASONE PROPIONATE 50 MCG
2 SPRAY, SUSPENSION (ML) NASAL DAILY
Qty: 16 G | Refills: 0 | Status: SHIPPED | OUTPATIENT
Start: 2023-03-30 | End: 2023-04-26

## 2023-03-30 RX ORDER — BENZONATATE 100 MG/1
100 CAPSULE ORAL 3 TIMES DAILY PRN
Qty: 30 CAPSULE | Refills: 1 | Status: SHIPPED | OUTPATIENT
Start: 2023-03-30 | End: 2023-04-09

## 2023-03-30 NOTE — PROGRESS NOTES
[facility] Note    HPI:  Sadaf Dhaliwal is a 42 y.o. female who presents today for Follow-up  Endorses overcoming recent upper respiratory infection. Has been able to focus on school work and completing assignments.   Denies fever, chills, nausea, vomiting, chest pain, palpitations.     Review of Systems   HENT:  Negative for hearing loss.    Eyes:  Negative for discharge.   Respiratory:  Positive for wheezing.    Cardiovascular:  Negative for chest pain and palpitations.   Gastrointestinal:  Negative for blood in stool, constipation, diarrhea and vomiting.   Genitourinary:  Negative for dysuria and hematuria.   Musculoskeletal:  Positive for neck pain.   Neurological:  Positive for headaches. Negative for weakness.   Endo/Heme/Allergies:  Negative for polydipsia.    A review of systems was performed and was negative except as noted above.  I personally reviewed allergies, past medical, surgical, social and family history and updated as appropriate.    Medications:    Current Outpatient Medications:     adalimumab (HUMIRA PEN CROHNS-UC-HS START) 40 mg/0.8 mL PnKt, , Disp: , Rfl:     amitriptyline (ELAVIL) 75 MG tablet, Take 75 mg by mouth every evening., Disp: , Rfl:     azaTHIOprine (IMURAN) 50 mg Tab, , Disp: , Rfl:     baclofen (LIORESAL) 10 MG tablet, Take 10 mg by mouth 2 (two) times daily., Disp: , Rfl:     EMGALITY  mg/mL PnIj, Inject into the skin., Disp: , Rfl:     hydroCHLOROthiazide (HYDRODIURIL) 12.5 MG Tab, Take 1 tablet (12.5 mg total) by mouth once daily., Disp: 30 tablet, Rfl: 2    Lactobacillus acidoph-L.bulgar 1 million cell Chew, Take 4 tablets by mouth 3 (three) times daily with meals., Disp: 360 tablet, Rfl: 1    levocetirizine (XYZAL) 5 MG tablet, Take 5 mg by mouth every evening., Disp: , Rfl:     levothyroxine (SYNTHROID) 88 MCG tablet, Take 88 mcg by mouth., Disp: , Rfl:     losartan (COZAAR) 100 MG tablet, Take 1 tablet (100 mg total) by mouth once daily.,  Disp: 30 tablet, Rfl: 2    propranoloL (INDERAL) 20 MG tablet, Take 1 tablet (20 mg total) by mouth once daily., Disp: 30 tablet, Rfl: 2    valACYclovir (VALTREX) 500 MG tablet, Take 500 mg by mouth 2 (two) times daily., Disp: , Rfl:     venlafaxine (EFFEXOR-XR) 150 MG Cp24, Take 1 capsule (150 mg total) by mouth once daily., Disp: 30 capsule, Rfl: 11    ZEMBRACE SYMTOUCH 3 mg/0.5 mL PnIj, Inject into the skin., Disp: , Rfl:     benzonatate (TESSALON) 100 MG capsule, Take 1 capsule (100 mg total) by mouth 3 (three) times daily as needed for Cough., Disp: 30 capsule, Rfl: 1    fluticasone propionate (FLONASE) 50 mcg/actuation nasal spray, 2 sprays (100 mcg total) by Each Nostril route once daily. for 14 days, Disp: 16 g, Rfl: 0    promethazine-dextromethorphan (PROMETHAZINE-DM) 6.25-15 mg/5 mL Syrp, Take 5 mLs by mouth every 8 (eight) hours as needed., Disp: 180 mL, Rfl: 0    rosuvastatin (CRESTOR) 40 MG Tab, Take 1 tablet (40 mg total) by mouth once daily., Disp: 90 tablet, Rfl: 3     Health Maintenance:  Immunization History   Administered Date(s) Administered    Tdap 03/02/2023      Health Maintenance   Topic Date Due    Mammogram  03/14/2024    TETANUS VACCINE  03/02/2033    Hepatitis C Screening  Completed    Lipid Panel  Completed     Health Maintenance Topics with due status: Not Due       Topic Last Completion Date    Hemoglobin A1c (Diabetic Prevention Screening) 07/14/2020    Cervical Cancer Screening 10/07/2020    TETANUS VACCINE 03/02/2023    Mammogram 03/14/2023     Health Maintenance Due   Topic Date Due    Pneumococcal Vaccines (Age 0-64) (1 - PCV) Never done    HIV Screening  Never done       PHYSICAL EXAM:  Vitals:    03/30/23 1430 03/30/23 1434   BP: (!) 146/93 (!) 141/89   BP Location: Right arm Right arm   Patient Position: Sitting Sitting   BP Method: Large (Automatic) Large (Automatic)   Pulse: 83    Resp: 16    Temp: 98.3 °F (36.8 °C)    TempSrc: Oral    SpO2: 98%    Weight: 87.5 kg (193 lb)   "  Height: 5' 3" (1.6 m)      Body mass index is 34.19 kg/m².  Physical Exam  Vitals and nursing note reviewed.   Constitutional:       General: She is not in acute distress.     Appearance: Normal appearance. She is normal weight. She is not ill-appearing or toxic-appearing.      Comments: Poor hygiene   HENT:      Head: Normocephalic and atraumatic.      Right Ear: External ear normal.      Left Ear: External ear normal.      Nose: Congestion and rhinorrhea present.      Comments: Hyperemic mucosa, +swelling     Mouth/Throat:      Mouth: Mucous membranes are dry.      Pharynx: Oropharynx is clear. No oropharyngeal exudate or posterior oropharyngeal erythema.      Comments: Poor dentition, multiple caries, left molar chipped tooth, pulp exposed, no bleeding, no swelling  Eyes:      Extraocular Movements: Extraocular movements intact.      Conjunctiva/sclera: Conjunctivae normal.   Cardiovascular:      Rate and Rhythm: Normal rate and regular rhythm.      Pulses: Normal pulses.      Heart sounds: Normal heart sounds.   Pulmonary:      Effort: Pulmonary effort is normal. No respiratory distress.      Breath sounds: No stridor. No wheezing, rhonchi or rales.   Abdominal:      General: Abdomen is flat. There is no distension.      Palpations: Abdomen is soft. There is no mass.      Tenderness: There is no abdominal tenderness. There is no right CVA tenderness, left CVA tenderness or guarding.   Musculoskeletal:         General: No swelling or tenderness. Normal range of motion.      Cervical back: Normal range of motion and neck supple. No rigidity or tenderness.      Right lower leg: No edema.      Left lower leg: No edema.   Lymphadenopathy:      Cervical: No cervical adenopathy.   Skin:     General: Skin is warm and dry.      Capillary Refill: Capillary refill takes less than 2 seconds.      Coloration: Skin is not pale.      Findings: No bruising or rash.   Neurological:      General: No focal deficit present.      " Mental Status: She is alert and oriented to person, place, and time. Mental status is at baseline.      Motor: No weakness.      Gait: Gait normal.   Psychiatric:         Mood and Affect: Mood normal.         Behavior: Behavior normal.         Thought Content: Thought content normal.         Judgment: Judgment normal.      ASSESSMENT/PLAN:  1. Post-viral cough syndrome  -     promethazine-dextromethorphan (PROMETHAZINE-DM) 6.25-15 mg/5 mL Syrp; Take 5 mLs by mouth every 8 (eight) hours as needed.  Dispense: 180 mL; Refill: 0  -     benzonatate (TESSALON) 100 MG capsule; Take 1 capsule (100 mg total) by mouth 3 (three) times daily as needed for Cough.  Dispense: 30 capsule; Refill: 1  -     fluticasone propionate (FLONASE) 50 mcg/actuation nasal spray; 2 sprays (100 mcg total) by Each Nostril route once daily. for 14 days  Dispense: 16 g; Refill: 0    2. Hyperlipidemia, unspecified hyperlipidemia type  -     rosuvastatin (CRESTOR) 40 MG Tab; Take 1 tablet (40 mg total) by mouth once daily.  Dispense: 90 tablet; Refill: 3      Other than changes above, continue current medications and maintain follow up with specialists.      No follow-ups on file.   Recent Results (from the past 2016 hour(s))   Lipid Panel    Collection Time: 02/16/23  2:25 PM   Result Value Ref Range    Cholesterol 284 (H) 120 - 199 mg/dL    Triglycerides 197 (H) 30 - 150 mg/dL    HDL 36 (L) 40 - 75 mg/dL    LDL Cholesterol 208.6 (H) 63.0 - 159.0 mg/dL    HDL/Cholesterol Ratio 12.7 (L) 20.0 - 50.0 %    Total Cholesterol/HDL Ratio 7.9 (H) 2.0 - 5.0    Non-HDL Cholesterol 248 mg/dL   T4, Free    Collection Time: 02/16/23  2:25 PM   Result Value Ref Range    Free T4 0.86 0.71 - 1.51 ng/dL   TSH    Collection Time: 02/16/23  2:25 PM   Result Value Ref Range    TSH 2.580 0.400 - 4.000 uIU/mL   Comprehensive Metabolic Panel    Collection Time: 02/16/23  2:25 PM   Result Value Ref Range    Sodium 141 136 - 145 mmol/L    Potassium 4.0 3.5 - 5.1 mmol/L     Chloride 109 95 - 110 mmol/L    CO2 29 23 - 29 mmol/L    Glucose 73 70 - 110 mg/dL    BUN 8 6 - 20 mg/dL    Creatinine 0.9 0.5 - 1.4 mg/dL    Calcium 9.1 8.7 - 10.5 mg/dL    Total Protein 8.1 6.0 - 8.4 g/dL    Albumin 3.8 3.5 - 5.2 g/dL    Total Bilirubin 0.2 0.1 - 1.0 mg/dL    Alkaline Phosphatase 115 55 - 135 U/L    AST 31 10 - 40 U/L    ALT 39 10 - 44 U/L    Anion Gap 3 (L) 8 - 16 mmol/L    eGFR >60.0 >60 mL/min/1.73 m^2         Kazumi G Yoshinaga, DO [facility]    Answers submitted by the patient for this visit:  Review of Systems Questionnaire (Submitted on 3/29/2023)  activity change: No  unexpected weight change: No  rhinorrhea: Yes  trouble swallowing: Yes  visual disturbance: No  chest tightness: No  polyuria: No  difficulty urinating: No  menstrual problem: No  joint swelling: No  arthralgias: No  confusion: No  dysphoric mood: No

## 2023-04-11 ENCOUNTER — PATIENT OUTREACH (OUTPATIENT)
Dept: ADMINISTRATIVE | Facility: HOSPITAL | Age: 43
End: 2023-04-11
Payer: MEDICAID

## 2023-04-26 ENCOUNTER — OFFICE VISIT (OUTPATIENT)
Dept: PRIMARY CARE CLINIC | Facility: CLINIC | Age: 43
End: 2023-04-26
Payer: MEDICAID

## 2023-04-26 VITALS
WEIGHT: 198 LBS | OXYGEN SATURATION: 95 % | RESPIRATION RATE: 16 BRPM | DIASTOLIC BLOOD PRESSURE: 78 MMHG | BODY MASS INDEX: 35.08 KG/M2 | SYSTOLIC BLOOD PRESSURE: 128 MMHG | HEIGHT: 63 IN | TEMPERATURE: 99 F | HEART RATE: 103 BPM

## 2023-04-26 DIAGNOSIS — I10 PRIMARY HYPERTENSION: ICD-10-CM

## 2023-04-26 DIAGNOSIS — J30.89 ENVIRONMENTAL AND SEASONAL ALLERGIES: ICD-10-CM

## 2023-04-26 DIAGNOSIS — R09.81 NOSE CONGESTION: Primary | ICD-10-CM

## 2023-04-26 PROCEDURE — 1159F MED LIST DOCD IN RCRD: CPT | Mod: CPTII,,, | Performed by: STUDENT IN AN ORGANIZED HEALTH CARE EDUCATION/TRAINING PROGRAM

## 2023-04-26 PROCEDURE — 1160F RVW MEDS BY RX/DR IN RCRD: CPT | Mod: CPTII,,, | Performed by: STUDENT IN AN ORGANIZED HEALTH CARE EDUCATION/TRAINING PROGRAM

## 2023-04-26 PROCEDURE — 1160F PR REVIEW ALL MEDS BY PRESCRIBER/CLIN PHARMACIST DOCUMENTED: ICD-10-PCS | Mod: CPTII,,, | Performed by: STUDENT IN AN ORGANIZED HEALTH CARE EDUCATION/TRAINING PROGRAM

## 2023-04-26 PROCEDURE — 99999 PR PBB SHADOW E&M-EST. PATIENT-LVL IV: ICD-10-PCS | Mod: PBBFAC,,, | Performed by: STUDENT IN AN ORGANIZED HEALTH CARE EDUCATION/TRAINING PROGRAM

## 2023-04-26 PROCEDURE — 1159F PR MEDICATION LIST DOCUMENTED IN MEDICAL RECORD: ICD-10-PCS | Mod: CPTII,,, | Performed by: STUDENT IN AN ORGANIZED HEALTH CARE EDUCATION/TRAINING PROGRAM

## 2023-04-26 PROCEDURE — 99213 OFFICE O/P EST LOW 20 MIN: CPT | Mod: S$PBB,,, | Performed by: STUDENT IN AN ORGANIZED HEALTH CARE EDUCATION/TRAINING PROGRAM

## 2023-04-26 PROCEDURE — 99213 PR OFFICE/OUTPT VISIT, EST, LEVL III, 20-29 MIN: ICD-10-PCS | Mod: S$PBB,,, | Performed by: STUDENT IN AN ORGANIZED HEALTH CARE EDUCATION/TRAINING PROGRAM

## 2023-04-26 PROCEDURE — 3008F PR BODY MASS INDEX (BMI) DOCUMENTED: ICD-10-PCS | Mod: CPTII,,, | Performed by: STUDENT IN AN ORGANIZED HEALTH CARE EDUCATION/TRAINING PROGRAM

## 2023-04-26 PROCEDURE — 4010F ACE/ARB THERAPY RXD/TAKEN: CPT | Mod: CPTII,,, | Performed by: STUDENT IN AN ORGANIZED HEALTH CARE EDUCATION/TRAINING PROGRAM

## 2023-04-26 PROCEDURE — 99999 PR PBB SHADOW E&M-EST. PATIENT-LVL IV: CPT | Mod: PBBFAC,,, | Performed by: STUDENT IN AN ORGANIZED HEALTH CARE EDUCATION/TRAINING PROGRAM

## 2023-04-26 PROCEDURE — 4010F PR ACE/ARB THEARPY RXD/TAKEN: ICD-10-PCS | Mod: CPTII,,, | Performed by: STUDENT IN AN ORGANIZED HEALTH CARE EDUCATION/TRAINING PROGRAM

## 2023-04-26 PROCEDURE — 3074F PR MOST RECENT SYSTOLIC BLOOD PRESSURE < 130 MM HG: ICD-10-PCS | Mod: CPTII,,, | Performed by: STUDENT IN AN ORGANIZED HEALTH CARE EDUCATION/TRAINING PROGRAM

## 2023-04-26 PROCEDURE — 99214 OFFICE O/P EST MOD 30 MIN: CPT | Mod: PBBFAC,PN | Performed by: STUDENT IN AN ORGANIZED HEALTH CARE EDUCATION/TRAINING PROGRAM

## 2023-04-26 PROCEDURE — 3078F DIAST BP <80 MM HG: CPT | Mod: CPTII,,, | Performed by: STUDENT IN AN ORGANIZED HEALTH CARE EDUCATION/TRAINING PROGRAM

## 2023-04-26 PROCEDURE — 3008F BODY MASS INDEX DOCD: CPT | Mod: CPTII,,, | Performed by: STUDENT IN AN ORGANIZED HEALTH CARE EDUCATION/TRAINING PROGRAM

## 2023-04-26 PROCEDURE — 3074F SYST BP LT 130 MM HG: CPT | Mod: CPTII,,, | Performed by: STUDENT IN AN ORGANIZED HEALTH CARE EDUCATION/TRAINING PROGRAM

## 2023-04-26 PROCEDURE — 3078F PR MOST RECENT DIASTOLIC BLOOD PRESSURE < 80 MM HG: ICD-10-PCS | Mod: CPTII,,, | Performed by: STUDENT IN AN ORGANIZED HEALTH CARE EDUCATION/TRAINING PROGRAM

## 2023-04-26 RX ORDER — PREDNISONE 20 MG/1
40 TABLET ORAL DAILY
Qty: 10 TABLET | Refills: 0 | Status: SHIPPED | OUTPATIENT
Start: 2023-04-26 | End: 2023-05-01

## 2023-04-26 RX ORDER — FLUTICASONE PROPIONATE 50 MCG
1 SPRAY, SUSPENSION (ML) NASAL DAILY
Qty: 10 ML | Refills: 2 | Status: CANCELLED | OUTPATIENT
Start: 2023-04-26 | End: 2023-05-26

## 2023-04-26 RX ORDER — LEVOCETIRIZINE DIHYDROCHLORIDE 5 MG/1
5 TABLET, FILM COATED ORAL NIGHTLY
Qty: 30 TABLET | Refills: 2 | Status: SHIPPED | OUTPATIENT
Start: 2023-04-26 | End: 2023-06-22

## 2023-04-26 RX ORDER — GUAIFENESIN 600 MG/1
1200 TABLET, EXTENDED RELEASE ORAL 2 TIMES DAILY
Qty: 56 TABLET | Refills: 0 | Status: SHIPPED | OUTPATIENT
Start: 2023-04-26 | End: 2023-05-10

## 2023-04-26 NOTE — ASSESSMENT & PLAN NOTE
Patient with sensitivity to environmental allergen.   - nasal mucosal hyperemic, flonase did not help, will give short course prednisone  - start zyxal   - follow up 2 weeks or sooner with worsening

## 2023-04-26 NOTE — PROGRESS NOTES
[facility] Note    HPI:  Sadaf Dhaliwal is a 42 y.o. female who presents today for Hypertension (Blood pressure check.   ) and Nasal Congestion (Patient wants something for congestion she been having since having covid. )  Endorses overcoming recent upper respiratory infection. Has been able to focus on school work and completing assignments.   Denies fever, chills, excessive headaches, dizziness, wheezing, coughing, nausea, vomiting, chest pain, palpitations.   Flonase did not work last time.      A review of systems was performed and was negative except as noted above.  I personally reviewed allergies, past medical, surgical, social and family history and updated as appropriate.    Medications:    Current Outpatient Medications:     adalimumab (HUMIRA PEN CROHNS-UC-HS START) 40 mg/0.8 mL PnKt, , Disp: , Rfl:     amitriptyline (ELAVIL) 75 MG tablet, Take 75 mg by mouth every evening., Disp: , Rfl:     azaTHIOprine (IMURAN) 50 mg Tab, , Disp: , Rfl:     baclofen (LIORESAL) 10 MG tablet, Take 10 mg by mouth 2 (two) times daily., Disp: , Rfl:     EMGALITY  mg/mL PnIj, Inject into the skin., Disp: , Rfl:     hydroCHLOROthiazide (HYDRODIURIL) 12.5 MG Tab, Take 1 tablet (12.5 mg total) by mouth once daily., Disp: 30 tablet, Rfl: 2    Lactobacillus acidoph-L.bulgar 1 million cell Chew, Take 4 tablets by mouth 3 (three) times daily with meals., Disp: 360 tablet, Rfl: 1    levocetirizine (XYZAL) 5 MG tablet, Take 5 mg by mouth every evening., Disp: , Rfl:     levothyroxine (SYNTHROID) 88 MCG tablet, Take 88 mcg by mouth., Disp: , Rfl:     losartan (COZAAR) 100 MG tablet, Take 1 tablet (100 mg total) by mouth once daily., Disp: 30 tablet, Rfl: 2    propranoloL (INDERAL) 20 MG tablet, Take 1 tablet (20 mg total) by mouth once daily., Disp: 30 tablet, Rfl: 2    rosuvastatin (CRESTOR) 40 MG Tab, Take 1 tablet (40 mg total) by mouth once daily., Disp: 90 tablet, Rfl: 3    valACYclovir (VALTREX) 500  "MG tablet, Take 500 mg by mouth 2 (two) times daily., Disp: , Rfl:     venlafaxine (EFFEXOR-XR) 150 MG Cp24, Take 1 capsule (150 mg total) by mouth once daily., Disp: 30 capsule, Rfl: 11    ZEMBRACE SYMTOUCH 3 mg/0.5 mL PnIj, Inject into the skin., Disp: , Rfl:     guaiFENesin (MUCINEX) 600 mg 12 hr tablet, Take 2 tablets (1,200 mg total) by mouth 2 (two) times daily. for 14 days, Disp: 56 tablet, Rfl: 0    levocetirizine (XYZAL) 5 MG tablet, Take 1 tablet (5 mg total) by mouth every evening., Disp: 30 tablet, Rfl: 2    predniSONE (DELTASONE) 20 MG tablet, Take 2 tablets (40 mg total) by mouth once daily. for 5 days, Disp: 10 tablet, Rfl: 0     Health Maintenance:  Immunization History   Administered Date(s) Administered    Tdap 03/02/2023      Health Maintenance   Topic Date Due    Mammogram  03/14/2024    TETANUS VACCINE  03/02/2033    Hepatitis C Screening  Completed    Lipid Panel  Completed     Health Maintenance Topics with due status: Not Due       Topic Last Completion Date    Hemoglobin A1c (Diabetic Prevention Screening) 07/14/2020    Cervical Cancer Screening 10/07/2020    TETANUS VACCINE 03/02/2023    Mammogram 03/14/2023     Health Maintenance Due   Topic Date Due    Pneumococcal Vaccines (Age 0-64) (1 - PCV) Never done    HIV Screening  Never done       PHYSICAL EXAM:  Vitals:    04/26/23 1517   BP: 128/78   BP Location: Left arm   Patient Position: Sitting   BP Method: Large (Automatic)   Pulse: 103   Resp: 16   Temp: 98.6 °F (37 °C)   TempSrc: Oral   SpO2: 95%   Weight: 89.8 kg (198 lb)   Height: 5' 3" (1.6 m)     Body mass index is 35.07 kg/m².  Physical Exam  Vitals and nursing note reviewed.   Constitutional:       General: She is not in acute distress.     Appearance: Normal appearance. She is normal weight. She is not ill-appearing or toxic-appearing.      Comments: Poor hygiene   HENT:      Head: Normocephalic and atraumatic.      Right Ear: External ear normal.      Left Ear: External ear " normal.      Nose: Congestion and rhinorrhea present.      Comments: Hyperemic mucosa, +swelling     Mouth/Throat:      Mouth: Mucous membranes are dry.      Pharynx: Oropharynx is clear. No oropharyngeal exudate or posterior oropharyngeal erythema.      Comments: Poor dentition, multiple caries, left molar chipped tooth, pulp exposed, no bleeding, no swelling  Eyes:      Extraocular Movements: Extraocular movements intact.      Conjunctiva/sclera: Conjunctivae normal.   Cardiovascular:      Rate and Rhythm: Normal rate and regular rhythm.      Pulses: Normal pulses.      Heart sounds: Normal heart sounds.   Pulmonary:      Effort: Pulmonary effort is normal. No respiratory distress.      Breath sounds: No stridor. No wheezing, rhonchi or rales.   Abdominal:      General: Abdomen is flat. There is no distension.      Palpations: Abdomen is soft. There is no mass.      Tenderness: There is no abdominal tenderness. There is no right CVA tenderness, left CVA tenderness or guarding.   Musculoskeletal:         General: No swelling or tenderness. Normal range of motion.      Cervical back: Normal range of motion and neck supple. No rigidity or tenderness.      Right lower leg: No edema.      Left lower leg: No edema.   Lymphadenopathy:      Cervical: No cervical adenopathy.   Skin:     General: Skin is warm and dry.      Capillary Refill: Capillary refill takes less than 2 seconds.      Coloration: Skin is not pale.      Findings: No bruising or rash.   Neurological:      General: No focal deficit present.      Mental Status: She is alert and oriented to person, place, and time. Mental status is at baseline.      Motor: No weakness.      Gait: Gait normal.   Psychiatric:         Mood and Affect: Mood normal.         Behavior: Behavior normal.         Thought Content: Thought content normal.         Judgment: Judgment normal.      ASSESSMENT/PLAN:  1. Nose congestion  -     levocetirizine (XYZAL) 5 MG tablet; Take 1 tablet  (5 mg total) by mouth every evening.  Dispense: 30 tablet; Refill: 2  -     guaiFENesin (MUCINEX) 600 mg 12 hr tablet; Take 2 tablets (1,200 mg total) by mouth 2 (two) times daily. for 14 days  Dispense: 56 tablet; Refill: 0  -     predniSONE (DELTASONE) 20 MG tablet; Take 2 tablets (40 mg total) by mouth once daily. for 5 days  Dispense: 10 tablet; Refill: 0    2. Primary hypertension  Overview:  - losartan 100 mg daily  - HCTZ 12.5 mg daily  - propranolol 20 mg daily  - low sodium diet <2 g or 2 teaspoons daily  - increase daily whole foods, vegetables, fruits, meats in diet    Assessment & Plan:  Normotensive. Patient back on track taking BP meds regularly.   - continue with current regimen  - encouraged daily water intake of at least 1.5 to 2L       3. Environmental and seasonal allergies  Assessment & Plan:  Patient with sensitivity to environmental allergen.   - nasal mucosal hyperemic, flonase did not help, will give short course prednisone  - start zyxal   - follow up 2 weeks or sooner with worsening        Orders:  -     levocetirizine (XYZAL) 5 MG tablet; Take 1 tablet (5 mg total) by mouth every evening.  Dispense: 30 tablet; Refill: 2  -     guaiFENesin (MUCINEX) 600 mg 12 hr tablet; Take 2 tablets (1,200 mg total) by mouth 2 (two) times daily. for 14 days  Dispense: 56 tablet; Refill: 0  -     predniSONE (DELTASONE) 20 MG tablet; Take 2 tablets (40 mg total) by mouth once daily. for 5 days  Dispense: 10 tablet; Refill: 0      Other than changes above, continue current medications and maintain follow up with specialists.      Follow up in about 4 weeks (around 5/24/2023), or if symptoms worsen or fail to improve, for Med recheck.   Recent Results (from the past 2016 hour(s))   Lipid Panel    Collection Time: 02/16/23  2:25 PM   Result Value Ref Range    Cholesterol 284 (H) 120 - 199 mg/dL    Triglycerides 197 (H) 30 - 150 mg/dL    HDL 36 (L) 40 - 75 mg/dL    LDL Cholesterol 208.6 (H) 63.0 - 159.0 mg/dL     HDL/Cholesterol Ratio 12.7 (L) 20.0 - 50.0 %    Total Cholesterol/HDL Ratio 7.9 (H) 2.0 - 5.0    Non-HDL Cholesterol 248 mg/dL   T4, Free    Collection Time: 02/16/23  2:25 PM   Result Value Ref Range    Free T4 0.86 0.71 - 1.51 ng/dL   TSH    Collection Time: 02/16/23  2:25 PM   Result Value Ref Range    TSH 2.580 0.400 - 4.000 uIU/mL   Comprehensive Metabolic Panel    Collection Time: 02/16/23  2:25 PM   Result Value Ref Range    Sodium 141 136 - 145 mmol/L    Potassium 4.0 3.5 - 5.1 mmol/L    Chloride 109 95 - 110 mmol/L    CO2 29 23 - 29 mmol/L    Glucose 73 70 - 110 mg/dL    BUN 8 6 - 20 mg/dL    Creatinine 0.9 0.5 - 1.4 mg/dL    Calcium 9.1 8.7 - 10.5 mg/dL    Total Protein 8.1 6.0 - 8.4 g/dL    Albumin 3.8 3.5 - 5.2 g/dL    Total Bilirubin 0.2 0.1 - 1.0 mg/dL    Alkaline Phosphatase 115 55 - 135 U/L    AST 31 10 - 40 U/L    ALT 39 10 - 44 U/L    Anion Gap 3 (L) 8 - 16 mmol/L    eGFR >60.0 >60 mL/min/1.73 m^2     Kazumi G Yoshinaga, DO [facility]

## 2023-04-26 NOTE — ASSESSMENT & PLAN NOTE
Normotensive. Patient back on track taking BP meds regularly.   - continue with current regimen  - encouraged daily water intake of at least 1.5 to 2L

## 2023-05-08 ENCOUNTER — OFFICE VISIT (OUTPATIENT)
Dept: OBSTETRICS AND GYNECOLOGY | Facility: CLINIC | Age: 43
End: 2023-05-08
Payer: MEDICAID

## 2023-05-08 VITALS
DIASTOLIC BLOOD PRESSURE: 100 MMHG | BODY MASS INDEX: 35.44 KG/M2 | WEIGHT: 200 LBS | HEIGHT: 63 IN | SYSTOLIC BLOOD PRESSURE: 162 MMHG | HEART RATE: 89 BPM

## 2023-05-08 DIAGNOSIS — I10 PRIMARY HYPERTENSION: ICD-10-CM

## 2023-05-08 DIAGNOSIS — Z12.4 SCREENING FOR MALIGNANT NEOPLASM OF THE CERVIX: Primary | ICD-10-CM

## 2023-05-08 DIAGNOSIS — Z78.0 POSTMENOPAUSAL: ICD-10-CM

## 2023-05-08 DIAGNOSIS — E66.9 OBESITY (BMI 30-39.9): ICD-10-CM

## 2023-05-08 DIAGNOSIS — Z01.419 ENCOUNTER FOR ANNUAL ROUTINE GYNECOLOGICAL EXAMINATION: ICD-10-CM

## 2023-05-08 PROCEDURE — 99999 PR PBB SHADOW E&M-EST. PATIENT-LVL III: CPT | Mod: PBBFAC,,, | Performed by: OBSTETRICS & GYNECOLOGY

## 2023-05-08 PROCEDURE — 1160F RVW MEDS BY RX/DR IN RCRD: CPT | Mod: CPTII,,, | Performed by: OBSTETRICS & GYNECOLOGY

## 2023-05-08 PROCEDURE — 3077F PR MOST RECENT SYSTOLIC BLOOD PRESSURE >= 140 MM HG: ICD-10-PCS | Mod: CPTII,,, | Performed by: OBSTETRICS & GYNECOLOGY

## 2023-05-08 PROCEDURE — 3080F PR MOST RECENT DIASTOLIC BLOOD PRESSURE >= 90 MM HG: ICD-10-PCS | Mod: CPTII,,, | Performed by: OBSTETRICS & GYNECOLOGY

## 2023-05-08 PROCEDURE — 99386 PREV VISIT NEW AGE 40-64: CPT | Mod: S$PBB,,, | Performed by: OBSTETRICS & GYNECOLOGY

## 2023-05-08 PROCEDURE — 3008F BODY MASS INDEX DOCD: CPT | Mod: CPTII,,, | Performed by: OBSTETRICS & GYNECOLOGY

## 2023-05-08 PROCEDURE — 99386 PR PREVENTIVE VISIT,NEW,40-64: ICD-10-PCS | Mod: S$PBB,,, | Performed by: OBSTETRICS & GYNECOLOGY

## 2023-05-08 PROCEDURE — 3008F PR BODY MASS INDEX (BMI) DOCUMENTED: ICD-10-PCS | Mod: CPTII,,, | Performed by: OBSTETRICS & GYNECOLOGY

## 2023-05-08 PROCEDURE — 99999 PR PBB SHADOW E&M-EST. PATIENT-LVL III: ICD-10-PCS | Mod: PBBFAC,,, | Performed by: OBSTETRICS & GYNECOLOGY

## 2023-05-08 PROCEDURE — 4010F ACE/ARB THERAPY RXD/TAKEN: CPT | Mod: CPTII,,, | Performed by: OBSTETRICS & GYNECOLOGY

## 2023-05-08 PROCEDURE — 1159F MED LIST DOCD IN RCRD: CPT | Mod: CPTII,,, | Performed by: OBSTETRICS & GYNECOLOGY

## 2023-05-08 PROCEDURE — 3080F DIAST BP >= 90 MM HG: CPT | Mod: CPTII,,, | Performed by: OBSTETRICS & GYNECOLOGY

## 2023-05-08 PROCEDURE — 3077F SYST BP >= 140 MM HG: CPT | Mod: CPTII,,, | Performed by: OBSTETRICS & GYNECOLOGY

## 2023-05-08 PROCEDURE — 1160F PR REVIEW ALL MEDS BY PRESCRIBER/CLIN PHARMACIST DOCUMENTED: ICD-10-PCS | Mod: CPTII,,, | Performed by: OBSTETRICS & GYNECOLOGY

## 2023-05-08 PROCEDURE — 4010F PR ACE/ARB THEARPY RXD/TAKEN: ICD-10-PCS | Mod: CPTII,,, | Performed by: OBSTETRICS & GYNECOLOGY

## 2023-05-08 PROCEDURE — 1159F PR MEDICATION LIST DOCUMENTED IN MEDICAL RECORD: ICD-10-PCS | Mod: CPTII,,, | Performed by: OBSTETRICS & GYNECOLOGY

## 2023-05-08 PROCEDURE — 99213 OFFICE O/P EST LOW 20 MIN: CPT | Mod: PBBFAC | Performed by: OBSTETRICS & GYNECOLOGY

## 2023-05-08 NOTE — PROGRESS NOTES
Subjective:    Patient ID: Sadaf Dhaliwal is a 42 y.o. female.     Chief Complaint: Annual Well Woman Exam     History of Present Illness:  Sadaf presents today for Annual Well Woman exam. .Patient's last menstrual period was 2021.. She is currently using no hormone therapy and she reports problems with Hot flashes. She denies breast tenderness, denies masses, denies nipple discharge. She denies difficulty with urination. Bowel movements have not significantly changed.     Patient states her Crohn's disease made her go into menopause      Menstrual History:   Patient's last menstrual period was 2021..     OB History          1    Para   1    Term   1            AB        Living             SAB        IAB        Ectopic        Multiple        Live Births                     Review of Systems   Constitutional:  Negative for chills, fatigue and fever.   Respiratory:  Negative for shortness of breath.    Cardiovascular:  Negative for chest pain.   Gastrointestinal:  Negative for abdominal pain, constipation, diarrhea and nausea.   Genitourinary:  Negative for bladder incontinence, dysuria, hot flashes, pelvic pain and vaginal bleeding.   Neurological:  Negative for headaches.   Psychiatric/Behavioral:  Negative for depression.        Objective:    Vital Signs:  Vitals:    23 1444   BP: (!) 162/100   Pulse: 89       Physical Exam:  General:  alert, no distress, mildly obese   Skin:  Skin color, texture, turgor normal. No rashes or lesions   HEENT:  conjunctivae/corneas clear. PERRL., poor dentition   Neck: supple, trachea midline, no adenopathy or thyromegaly   Respiratory:  clear to auscultation bilaterally   Heart:  regular rate and rhythm, S1, S2 normal, no murmur, click, rub or gallop   Breasts: Left Breast: Nipple protruding. No nipple discharge. No palpable masses, erythema, skin changes, tenderness, or adenopathy.  Right Breast: Nipple protruding. No nipple discharge. No  palpable masses, erythema, skin changes, tenderness, or adenopathy.   Abdomen:   Soft, obese, nontender.  No masses   Pelvis: External genitalia: normal general appearance  Urinary system: urethral meatus normal, bladder nontender  Vaginal: normal mucosa without prolapse or lesions  Cervix: normal appearance  Uterus: normal single, nontender  Adnexa: normal bimanual exam   Extremities: Normal ROM; no edema, no cyanosis   Neurological: Normal strength and tone. No focal numbness or weakness. Reflexes 2+ and equal.   Psychiatric: normal mood, speech, dress, and thought processes             Assessment:      1. Screening for malignant neoplasm of the cervix    2. Encounter for annual routine gynecological examination    3. Postmenopausal    4. Primary hypertension    5. Obesity (BMI 30-39.9)          Plan:      Screening for malignant neoplasm of the cervix  -     Liquid-Based Pap Smear, Screening    Encounter for annual routine gynecological examination    Postmenopausal    Primary hypertension    Obesity (BMI 30-39.9)    Return for labs to check hormone levels in regard to menopause    Health Maintenance and Screening:   Sadaf was counseled on A.C.O.G. Pap guidelines and recommendations for yearly pelvic exams in addition to recommendations for yearly mammograms and monthly self breast exams, and adequate calcium and vitamin D in her diet.         Arabella Morfin MD FACOG    05/08/2023  2:59 PM

## 2023-05-11 LAB
HPV16+18+H RISK 12 DNA CVX-IMP: NORMAL
LIQUID BASED PAP SMEAR, SCREENING: NORMAL

## 2023-05-24 ENCOUNTER — CLINICAL SUPPORT (OUTPATIENT)
Dept: OBSTETRICS AND GYNECOLOGY | Facility: CLINIC | Age: 43
End: 2023-05-24
Payer: MEDICAID

## 2023-05-24 DIAGNOSIS — N91.2 AMENORRHEA: ICD-10-CM

## 2023-05-24 DIAGNOSIS — Z00.00 ROUTINE GENERAL MEDICAL EXAMINATION AT A HEALTH CARE FACILITY: Primary | ICD-10-CM

## 2023-05-24 LAB
ALBUMIN SERPL BCP-MCNC: 3.7 G/DL (ref 3.5–5.2)
ALP SERPL-CCNC: 124 U/L (ref 55–135)
ALT SERPL W/O P-5'-P-CCNC: 45 U/L (ref 10–44)
ANION GAP SERPL CALC-SCNC: 4 MMOL/L (ref 8–16)
AST SERPL-CCNC: 30 U/L (ref 10–40)
BASOPHILS # BLD AUTO: 0.04 K/UL (ref 0–0.2)
BASOPHILS NFR BLD: 0.6 % (ref 0–1.9)
BILIRUB SERPL-MCNC: 0.2 MG/DL (ref 0.1–1)
BUN SERPL-MCNC: 9 MG/DL (ref 6–20)
CALCIUM SERPL-MCNC: 9.2 MG/DL (ref 8.7–10.5)
CHLORIDE SERPL-SCNC: 107 MMOL/L (ref 95–110)
CHOLEST SERPL-MCNC: 132 MG/DL (ref 120–199)
CHOLEST/HDLC SERPL: 3.3 {RATIO} (ref 2–5)
CO2 SERPL-SCNC: 27 MMOL/L (ref 23–29)
CREAT SERPL-MCNC: 0.9 MG/DL (ref 0.5–1.4)
DIFFERENTIAL METHOD: ABNORMAL
EOSINOPHIL # BLD AUTO: 0.2 K/UL (ref 0–0.5)
EOSINOPHIL NFR BLD: 3.5 % (ref 0–8)
ERYTHROCYTE [DISTWIDTH] IN BLOOD BY AUTOMATED COUNT: 13.7 % (ref 11.5–14.5)
EST. GFR  (NO RACE VARIABLE): >60 ML/MIN/1.73 M^2
ESTIMATED AVG GLUCOSE: 131 MG/DL (ref 68–131)
GLUCOSE SERPL-MCNC: 135 MG/DL (ref 70–110)
HBA1C MFR BLD: 6.2 % (ref 4–5.6)
HCT VFR BLD AUTO: 39.5 % (ref 37–48.5)
HDLC SERPL-MCNC: 40 MG/DL (ref 40–75)
HDLC SERPL: 30.3 % (ref 20–50)
HGB BLD-MCNC: 13.5 G/DL (ref 12–16)
IMM GRANULOCYTES # BLD AUTO: 0.01 K/UL (ref 0–0.04)
IMM GRANULOCYTES NFR BLD AUTO: 0.2 % (ref 0–0.5)
LDLC SERPL CALC-MCNC: 68 MG/DL (ref 63–159)
LYMPHOCYTES # BLD AUTO: 2.8 K/UL (ref 1–4.8)
LYMPHOCYTES NFR BLD: 44.7 % (ref 18–48)
MCH RBC QN AUTO: 33.5 PG (ref 27–31)
MCHC RBC AUTO-ENTMCNC: 34.2 G/DL (ref 32–36)
MCV RBC AUTO: 98 FL (ref 82–98)
MONOCYTES # BLD AUTO: 0.4 K/UL (ref 0.3–1)
MONOCYTES NFR BLD: 6.8 % (ref 4–15)
NEUTROPHILS # BLD AUTO: 2.7 K/UL (ref 1.8–7.7)
NEUTROPHILS NFR BLD: 44.2 % (ref 38–73)
NONHDLC SERPL-MCNC: 92 MG/DL
NRBC BLD-RTO: 0 /100 WBC
PLATELET # BLD AUTO: 212 K/UL (ref 150–450)
PMV BLD AUTO: 9.3 FL (ref 9.2–12.9)
POTASSIUM SERPL-SCNC: 3.4 MMOL/L (ref 3.5–5.1)
PROT SERPL-MCNC: 7.9 G/DL (ref 6–8.4)
RBC # BLD AUTO: 4.03 M/UL (ref 4–5.4)
SODIUM SERPL-SCNC: 138 MMOL/L (ref 136–145)
T4 FREE SERPL-MCNC: 0.85 NG/DL (ref 0.71–1.51)
T4 SERPL-MCNC: 7.2 UG/DL (ref 4.5–11.5)
THYROPEROXIDASE IGG SERPL-ACNC: 7.9 IU/ML
TRIGL SERPL-MCNC: 120 MG/DL (ref 30–150)
TSH SERPL DL<=0.005 MIU/L-ACNC: 5.6 UIU/ML (ref 0.4–4)
WBC # BLD AUTO: 6.2 K/UL (ref 3.9–12.7)

## 2023-05-24 PROCEDURE — 83036 HEMOGLOBIN GLYCOSYLATED A1C: CPT | Performed by: OBSTETRICS & GYNECOLOGY

## 2023-05-24 PROCEDURE — 82670 ASSAY OF TOTAL ESTRADIOL: CPT | Performed by: OBSTETRICS & GYNECOLOGY

## 2023-05-24 PROCEDURE — 84439 ASSAY OF FREE THYROXINE: CPT | Performed by: OBSTETRICS & GYNECOLOGY

## 2023-05-24 PROCEDURE — 86376 MICROSOMAL ANTIBODY EACH: CPT | Performed by: OBSTETRICS & GYNECOLOGY

## 2023-05-24 PROCEDURE — 80053 COMPREHEN METABOLIC PANEL: CPT | Performed by: OBSTETRICS & GYNECOLOGY

## 2023-05-24 PROCEDURE — 85025 COMPLETE CBC W/AUTO DIFF WBC: CPT | Performed by: OBSTETRICS & GYNECOLOGY

## 2023-05-24 PROCEDURE — 80061 LIPID PANEL: CPT | Performed by: OBSTETRICS & GYNECOLOGY

## 2023-05-24 PROCEDURE — 84481 FREE ASSAY (FT-3): CPT | Performed by: OBSTETRICS & GYNECOLOGY

## 2023-05-24 PROCEDURE — 83001 ASSAY OF GONADOTROPIN (FSH): CPT | Performed by: OBSTETRICS & GYNECOLOGY

## 2023-05-24 PROCEDURE — 36415 COLL VENOUS BLD VENIPUNCTURE: CPT | Mod: PBBFAC

## 2023-05-24 PROCEDURE — 84403 ASSAY OF TOTAL TESTOSTERONE: CPT | Performed by: OBSTETRICS & GYNECOLOGY

## 2023-05-24 PROCEDURE — 84443 ASSAY THYROID STIM HORMONE: CPT | Performed by: OBSTETRICS & GYNECOLOGY

## 2023-05-24 PROCEDURE — 84436 ASSAY OF TOTAL THYROXINE: CPT | Performed by: OBSTETRICS & GYNECOLOGY

## 2023-05-24 NOTE — PROGRESS NOTES
Pt seated at lab chair, 8 tubes drawn at right AC with 21G x 1 1/2 in. x1 attempts. Pressure applied to area and bandage placed. Pt tolerated well no c/o. Left clinic ambulatory and in stable condition.

## 2023-05-25 LAB
ESTRADIOL SERPL-MCNC: 66 PG/ML
FSH SERPL-ACNC: 37.38 MIU/ML
T3FREE SERPL-MCNC: 2.7 PG/ML (ref 2.3–4.2)
TESTOST SERPL-MCNC: 33 NG/DL (ref 5–73)

## 2023-05-25 NOTE — PROGRESS NOTES
Please call patient regarding results.  1.  Some hormone levels in menopausal range and some in perimenopausal range. May benefit from some HRT  2.  Thyroid labs show that her synthroid may need adjusting--call PCP to have this done.   3. HgbA1C elevated in prediabetes range. Decrease carbs and processed sugars in diet

## 2023-05-29 DIAGNOSIS — N95.1 MENOPAUSAL SYMPTOM: Primary | ICD-10-CM

## 2023-06-21 DIAGNOSIS — R09.81 NOSE CONGESTION: ICD-10-CM

## 2023-06-21 DIAGNOSIS — J30.89 ENVIRONMENTAL AND SEASONAL ALLERGIES: ICD-10-CM

## 2023-06-22 RX ORDER — LEVOCETIRIZINE DIHYDROCHLORIDE 5 MG/1
TABLET, FILM COATED ORAL
Qty: 30 TABLET | Refills: 2 | Status: SHIPPED | OUTPATIENT
Start: 2023-06-22 | End: 2023-09-26

## 2023-06-26 ENCOUNTER — PATIENT MESSAGE (OUTPATIENT)
Dept: ADMINISTRATIVE | Facility: OTHER | Age: 43
End: 2023-06-26
Payer: MEDICAID

## 2023-07-22 DIAGNOSIS — E78.5 HYPERLIPIDEMIA, UNSPECIFIED HYPERLIPIDEMIA TYPE: ICD-10-CM

## 2023-07-24 RX ORDER — ROSUVASTATIN CALCIUM 40 MG/1
40 TABLET, COATED ORAL DAILY
Qty: 30 TABLET | Refills: 5 | Status: SHIPPED | OUTPATIENT
Start: 2023-07-24 | End: 2024-02-05 | Stop reason: SDUPTHER

## 2023-09-25 DIAGNOSIS — J30.89 ENVIRONMENTAL AND SEASONAL ALLERGIES: ICD-10-CM

## 2023-09-25 DIAGNOSIS — I10 PRIMARY HYPERTENSION: ICD-10-CM

## 2023-09-25 DIAGNOSIS — R09.81 NOSE CONGESTION: ICD-10-CM

## 2023-09-26 RX ORDER — LOSARTAN POTASSIUM 100 MG/1
TABLET ORAL
Qty: 30 TABLET | Refills: 2 | Status: SHIPPED | OUTPATIENT
Start: 2023-09-26 | End: 2023-12-30

## 2023-09-26 RX ORDER — LEVOCETIRIZINE DIHYDROCHLORIDE 5 MG/1
TABLET, FILM COATED ORAL
Qty: 30 TABLET | Refills: 2 | Status: SHIPPED | OUTPATIENT
Start: 2023-09-26 | End: 2023-12-30

## 2023-10-09 ENCOUNTER — PATIENT OUTREACH (OUTPATIENT)
Dept: ADMINISTRATIVE | Facility: HOSPITAL | Age: 43
End: 2023-10-09
Payer: MEDICAID

## 2023-11-20 DIAGNOSIS — E87.6 HYPOKALEMIA: Primary | ICD-10-CM

## 2023-11-20 DIAGNOSIS — I10 PRIMARY HYPERTENSION: ICD-10-CM

## 2023-11-21 RX ORDER — HYDROCHLOROTHIAZIDE 12.5 MG/1
TABLET ORAL
Qty: 30 TABLET | Refills: 2 | Status: SHIPPED | OUTPATIENT
Start: 2023-11-21 | End: 2024-02-29

## 2023-12-04 ENCOUNTER — LAB VISIT (OUTPATIENT)
Dept: LAB | Facility: HOSPITAL | Age: 43
End: 2023-12-04
Attending: STUDENT IN AN ORGANIZED HEALTH CARE EDUCATION/TRAINING PROGRAM
Payer: MEDICAID

## 2023-12-04 DIAGNOSIS — I10 PRIMARY HYPERTENSION: ICD-10-CM

## 2023-12-04 DIAGNOSIS — E87.6 HYPOKALEMIA: ICD-10-CM

## 2023-12-04 LAB
ALBUMIN SERPL BCP-MCNC: 3.6 G/DL (ref 3.5–5.2)
ALP SERPL-CCNC: 153 U/L (ref 55–135)
ALT SERPL W/O P-5'-P-CCNC: 73 U/L (ref 10–44)
ANION GAP SERPL CALC-SCNC: 5 MMOL/L (ref 3–11)
AST SERPL-CCNC: 58 U/L (ref 10–40)
BILIRUB SERPL-MCNC: 0.2 MG/DL (ref 0.1–1)
BUN SERPL-MCNC: 8 MG/DL (ref 6–20)
CALCIUM SERPL-MCNC: 8.8 MG/DL (ref 8.7–10.5)
CHLORIDE SERPL-SCNC: 106 MMOL/L (ref 95–110)
CO2 SERPL-SCNC: 29 MMOL/L (ref 23–29)
CREAT SERPL-MCNC: 0.9 MG/DL (ref 0.5–1.4)
EST. GFR  (NO RACE VARIABLE): >60 ML/MIN/1.73 M^2
GLUCOSE SERPL-MCNC: 175 MG/DL (ref 70–110)
MAGNESIUM SERPL-MCNC: 2.3 MG/DL (ref 1.6–2.6)
POTASSIUM SERPL-SCNC: 3.7 MMOL/L (ref 3.5–5.1)
PROT SERPL-MCNC: 7.6 G/DL (ref 6–8.4)
SODIUM SERPL-SCNC: 140 MMOL/L (ref 136–145)

## 2023-12-04 PROCEDURE — 80053 COMPREHEN METABOLIC PANEL: CPT | Performed by: STUDENT IN AN ORGANIZED HEALTH CARE EDUCATION/TRAINING PROGRAM

## 2023-12-04 PROCEDURE — 36415 COLL VENOUS BLD VENIPUNCTURE: CPT | Performed by: STUDENT IN AN ORGANIZED HEALTH CARE EDUCATION/TRAINING PROGRAM

## 2023-12-04 PROCEDURE — 83735 ASSAY OF MAGNESIUM: CPT | Performed by: STUDENT IN AN ORGANIZED HEALTH CARE EDUCATION/TRAINING PROGRAM

## 2023-12-05 ENCOUNTER — OFFICE VISIT (OUTPATIENT)
Dept: PRIMARY CARE CLINIC | Facility: CLINIC | Age: 43
End: 2023-12-05
Payer: MEDICAID

## 2023-12-05 VITALS
RESPIRATION RATE: 16 BRPM | WEIGHT: 207 LBS | OXYGEN SATURATION: 96 % | HEIGHT: 63 IN | DIASTOLIC BLOOD PRESSURE: 97 MMHG | SYSTOLIC BLOOD PRESSURE: 176 MMHG | TEMPERATURE: 98 F | BODY MASS INDEX: 36.68 KG/M2 | HEART RATE: 88 BPM

## 2023-12-05 DIAGNOSIS — E78.01 FAMILIAL HYPERCHOLESTEROLEMIA: ICD-10-CM

## 2023-12-05 DIAGNOSIS — E78.5 HYPERLIPIDEMIA, UNSPECIFIED HYPERLIPIDEMIA TYPE: ICD-10-CM

## 2023-12-05 DIAGNOSIS — E03.9 HYPOTHYROIDISM, UNSPECIFIED TYPE: ICD-10-CM

## 2023-12-05 DIAGNOSIS — I10 PRIMARY HYPERTENSION: Primary | ICD-10-CM

## 2023-12-05 DIAGNOSIS — E66.9 CLASS 1 OBESITY WITH SERIOUS COMORBIDITY AND BODY MASS INDEX (BMI) OF 34.0 TO 34.9 IN ADULT, UNSPECIFIED OBESITY TYPE: ICD-10-CM

## 2023-12-05 DIAGNOSIS — F33.0 RECURRENT MILD MAJOR DEPRESSIVE DISORDER WITH ANXIETY: ICD-10-CM

## 2023-12-05 DIAGNOSIS — F41.9 RECURRENT MILD MAJOR DEPRESSIVE DISORDER WITH ANXIETY: ICD-10-CM

## 2023-12-05 LAB
T4 FREE SERPL-MCNC: 0.84 NG/DL (ref 0.71–1.51)
TSH SERPL DL<=0.005 MIU/L-ACNC: 5.11 UIU/ML (ref 0.4–4)

## 2023-12-05 PROCEDURE — 99999 PR PBB SHADOW E&M-EST. PATIENT-LVL V: CPT | Mod: PBBFAC,,, | Performed by: STUDENT IN AN ORGANIZED HEALTH CARE EDUCATION/TRAINING PROGRAM

## 2023-12-05 PROCEDURE — 1160F RVW MEDS BY RX/DR IN RCRD: CPT | Mod: CPTII,,, | Performed by: STUDENT IN AN ORGANIZED HEALTH CARE EDUCATION/TRAINING PROGRAM

## 2023-12-05 PROCEDURE — 3077F SYST BP >= 140 MM HG: CPT | Mod: CPTII,,, | Performed by: STUDENT IN AN ORGANIZED HEALTH CARE EDUCATION/TRAINING PROGRAM

## 2023-12-05 PROCEDURE — 84439 ASSAY OF FREE THYROXINE: CPT | Performed by: STUDENT IN AN ORGANIZED HEALTH CARE EDUCATION/TRAINING PROGRAM

## 2023-12-05 PROCEDURE — 84443 ASSAY THYROID STIM HORMONE: CPT | Performed by: STUDENT IN AN ORGANIZED HEALTH CARE EDUCATION/TRAINING PROGRAM

## 2023-12-05 PROCEDURE — 1159F MED LIST DOCD IN RCRD: CPT | Mod: CPTII,,, | Performed by: STUDENT IN AN ORGANIZED HEALTH CARE EDUCATION/TRAINING PROGRAM

## 2023-12-05 PROCEDURE — 99214 OFFICE O/P EST MOD 30 MIN: CPT | Mod: S$PBB,,, | Performed by: STUDENT IN AN ORGANIZED HEALTH CARE EDUCATION/TRAINING PROGRAM

## 2023-12-05 PROCEDURE — 3080F DIAST BP >= 90 MM HG: CPT | Mod: CPTII,,, | Performed by: STUDENT IN AN ORGANIZED HEALTH CARE EDUCATION/TRAINING PROGRAM

## 2023-12-05 PROCEDURE — 3008F BODY MASS INDEX DOCD: CPT | Mod: CPTII,,, | Performed by: STUDENT IN AN ORGANIZED HEALTH CARE EDUCATION/TRAINING PROGRAM

## 2023-12-05 PROCEDURE — 99215 OFFICE O/P EST HI 40 MIN: CPT | Mod: PBBFAC,PO | Performed by: STUDENT IN AN ORGANIZED HEALTH CARE EDUCATION/TRAINING PROGRAM

## 2023-12-05 RX ORDER — AMLODIPINE BESYLATE 5 MG/1
TABLET ORAL
COMMUNITY
Start: 2023-11-20

## 2023-12-05 NOTE — PROGRESS NOTES
[facility] Note    HPI:  Sadaf Dhaliwal is a 43 y.o. female who presents today for Cough (Cough, congestion, sore throat and right ear pain. She states she feels like she has fluid in her ear. Patient states her ear is swollen.  She states she does not know if she ran any fever.  She states this has been going on for 2 weeks )    Denies fever, chills, excessive headaches, dizziness, wheezing, coughing, nausea, vomiting, chest pain, palpitations, shortness of breath, diarrhea, constipation.   Denies any appetite changes.      A review of systems was performed and was negative except as noted above.  I personally reviewed allergies, past medical, surgical, social and family history and updated as appropriate.    Medications:    Current Outpatient Medications:     adalimumab (HUMIRA PEN CROHNS-UC-HS START) 40 mg/0.8 mL PnKt, , Disp: , Rfl:     amitriptyline (ELAVIL) 75 MG tablet, Take 75 mg by mouth every evening., Disp: , Rfl:     amLODIPine (NORVASC) 5 MG tablet, TAKE ONE TABLET BY MOUTH ONCE DAILY (AT THE SAME TIME EVERYDAY) TO CONTROL BLOOD PRESSURE. THANK YOU!!!, Disp: , Rfl:     azaTHIOprine (IMURAN) 50 mg Tab, , Disp: , Rfl:     baclofen (LIORESAL) 10 MG tablet, Take 10 mg by mouth 2 (two) times daily., Disp: , Rfl:     EMGALITY  mg/mL PnIj, Inject into the skin., Disp: , Rfl:     estrogen, conjugated,-medroxyprogesterone 0.3-1.5 mg (PREMPRO) 0.3-1.5 mg per tablet, Take 1 tablet by mouth once daily., Disp: 30 tablet, Rfl: 3    hydroCHLOROthiazide (HYDRODIURIL) 12.5 MG Tab, Take 1 tablet (12.5 mg total) by mouth once daily. **THANK YOU**, Disp: 30 tablet, Rfl: 2    rosuvastatin (CRESTOR) 40 MG Tab, Take 1 tablet (40 mg total) by mouth once daily., Disp: 30 tablet, Rfl: 5    valACYclovir (VALTREX) 500 MG tablet, Take 500 mg by mouth 2 (two) times daily., Disp: , Rfl:     ZEMBRACE SYMTOUCH 3 mg/0.5 mL PnIj, Inject into the skin., Disp: , Rfl:     levocetirizine (XYZAL) 5 MG tablet,  "TAKE ONE TABLET BY MOUTH EVERY EVENING **THANK YOU**, Disp: 30 tablet, Rfl: 2    levothyroxine (SYNTHROID) 112 MCG tablet, Take 1 tablet (112 mcg total) by mouth before breakfast., Disp: 30 tablet, Rfl: 2    losartan (COZAAR) 100 MG tablet, Take 1 tablet (100 mg total) by mouth once daily. **THANK YOU**, Disp: 30 tablet, Rfl: 2    propranoloL (INDERAL) 20 MG tablet, Take 1 tablet (20 mg total) by mouth once daily., Disp: 30 tablet, Rfl: 2     Health Maintenance:  Immunization History   Administered Date(s) Administered    Tdap 03/02/2023      Health Maintenance   Topic Date Due    Mammogram  03/14/2024    High Dose Statin  12/12/2024    TETANUS VACCINE  03/02/2033    Hepatitis C Screening  Completed    Lipid Panel  Completed     Health Maintenance Topics with due status: Not Due       Topic Last Completion Date    TETANUS VACCINE 03/02/2023    Cervical Cancer Screening 05/08/2023    Hemoglobin A1c (Diabetic Prevention Screening) 05/24/2023    High Dose Statin 12/12/2023     Health Maintenance Due   Topic Date Due    Pneumococcal Vaccines (Age 0-64) (1 - PCV) Never done    HIV Screening  Never done    Influenza Vaccine (1) Never done    Mammogram  03/14/2024       PHYSICAL EXAM:  Vitals:    12/05/23 1320 12/05/23 1323 12/05/23 1456   BP: (!) 162/91 (!) 155/92 (!) 176/97   BP Location: Right arm Right arm    Patient Position: Sitting Sitting    BP Method: Large (Automatic) Large (Automatic)    Pulse: 87  88   Resp: 16     Temp: 97.8 °F (36.6 °C)     SpO2: 96%     Weight: 93.9 kg (207 lb)     Height: 5' 3" (1.6 m)       Body mass index is 36.67 kg/m².  Physical Exam  Vitals and nursing note reviewed.   Constitutional:       General: She is not in acute distress.     Appearance: Normal appearance. She is normal weight. She is not ill-appearing or toxic-appearing.      Comments: Poor hygiene   HENT:      Head: Normocephalic and atraumatic.      Right Ear: Tympanic membrane, ear canal and external ear normal.      Left " Ear: Tympanic membrane, ear canal and external ear normal.      Nose: No congestion or rhinorrhea.      Comments: Hyperemic mucosa, +swelling     Mouth/Throat:      Mouth: Mucous membranes are dry.      Pharynx: Oropharynx is clear. No oropharyngeal exudate or posterior oropharyngeal erythema.      Comments: Poor dentition, multiple caries, left molar chipped tooth, pulp exposed, no bleeding, no swelling  Eyes:      Extraocular Movements: Extraocular movements intact.      Conjunctiva/sclera: Conjunctivae normal.   Cardiovascular:      Rate and Rhythm: Normal rate and regular rhythm.      Pulses: Normal pulses.      Heart sounds: Normal heart sounds.   Pulmonary:      Effort: Pulmonary effort is normal. No respiratory distress.      Breath sounds: No stridor. No wheezing, rhonchi or rales.   Abdominal:      General: Abdomen is flat. There is no distension.      Palpations: Abdomen is soft. There is no mass.      Tenderness: There is no abdominal tenderness. There is no right CVA tenderness, left CVA tenderness or guarding.   Musculoskeletal:         General: No swelling or tenderness. Normal range of motion.      Cervical back: Normal range of motion and neck supple. No rigidity or tenderness.      Right lower leg: No edema.      Left lower leg: No edema.   Lymphadenopathy:      Cervical: No cervical adenopathy.   Skin:     General: Skin is warm and dry.      Capillary Refill: Capillary refill takes less than 2 seconds.      Coloration: Skin is not pale.      Findings: No bruising or rash.   Neurological:      General: No focal deficit present.      Mental Status: She is alert and oriented to person, place, and time. Mental status is at baseline.      Motor: No weakness.      Gait: Gait normal.   Psychiatric:         Mood and Affect: Mood normal.         Behavior: Behavior normal.         Thought Content: Thought content normal.         Judgment: Judgment normal.        ASSESSMENT/PLAN:  1. Primary  hypertension  Overview:  - losartan 100 mg daily  - HCTZ 12.5 mg daily  - propranolol 20 mg daily  - low sodium diet <2 g or 2 teaspoons daily  - increase daily whole foods, vegetables, fruits, meats in diet    Assessment & Plan:  BP Readings from Last 3 Encounters:   12/05/23 (!) 155/92   05/08/23 (!) 162/100   04/26/23 128/78   Elevated BP, per patient did not take her medications as she was in a hurry to get to clinic appointment.   Denies symptoms. Counseled at length on the importance of taking her medications at the same time of the day every day.   Keep pill box to keep track of daily/weekly medications to take.   - f/u two weeks with BP check        2. Hypothyroidism, unspecified type  Assessment & Plan:  Lab Results   Component Value Date    TSH 5.110 (H) 12/04/2023    Y2AMGVN 7.2 05/24/2023    FREET4 0.84 12/04/2023   Counseled at length on importance of taking her medication on empty stomach at the same time of the day, every day.   Continue with current levothyroxine 88 mcg daily.   - f/u 3 months, repeat TSH      Orders:  -     TSH; Future; Expected date: 12/05/2023  -     TSH; Future; Expected date: 02/01/2024  -     T4, Free; Future; Expected date: 02/01/2024    3. Hyperlipidemia, unspecified hyperlipidemia type  Assessment & Plan:  Continue pravastatin 40 mg daily  Lab Results   Component Value Date    CHOL 132 05/24/2023    CHOL 284 (H) 02/16/2023     Lab Results   Component Value Date    HDL 40 05/24/2023    HDL 36 (L) 02/16/2023     Lab Results   Component Value Date    LDLCALC 68.0 05/24/2023    LDLCALC 208.6 (H) 02/16/2023     Lab Results   Component Value Date    TRIG 120 05/24/2023    TRIG 197 (H) 02/16/2023       Lab Results   Component Value Date    CHOLHDL 30.3 05/24/2023    CHOLHDL 12.7 (L) 02/16/2023            4. Recurrent mild major depressive disorder with anxiety  Overview:  In the past took  - Deysrel, Paxil, and Buspar  Now taking daily,   - Amitriptyline 75 mg  - Venlafaxine 225 mg         Assessment & Plan:  Patient has persistent depression which is moderate and is currently controlled. Will  adjust  anti-depressant medications. Patient does not display psychosis at this time. Continue to monitor closely and adjust plan of care as needed.  - continue venlafaxine 150 mg daily  - continue amitriptyline 75 mg daily  - practice good sleep hygiene as discussed              5. Class 1 obesity with serious comorbidity and body mass index (BMI) of 34.0 to 34.9 in adult, unspecified obesity type  Overview:  Wt Readings from Last 8 Encounters:   03/02/23 89.4 kg (197 lb)   02/16/23 88.5 kg (195 lb)   10/12/22 88.9 kg (196 lb)   07/27/22 88.9 kg (196 lb)   02/10/22 96.2 kg (212 lb)   10/26/21 93.9 kg (207 lb)   08/19/21 92.5 kg (204 lb)   08/08/21 92.5 kg (204 lb)   Recommendations:   Stay physically active. As tolerated alternate resistance training with stretching and cardio. Goal of 150 minutes per week of moderate intensity activity or 7,500 - 10,000 steps per day. Follow the Mediterranean Diet. Include whole fresh fruits, vegetables, olive oil, seeds, nuts, whole grains, cold water fish, salmon, mackerel and lean cuts of meat.  Do not drink sugary/diet carbonated beverages. Decrease portion sizes slightly which will result in an approximately 500-calorie deficit. Avoid fast or fried and processed food, especially canned foods. Avoid refined carbohydrates, white starchy foods, flour, white potato, bread, muffins, and cakes. Consider substituting one meal a day with a meal replacement such as Slim fast, lean cuisine, or weight watcher's. Follow a healthy diet that includes enough calcium, vitamin D and proteins for bone health.        Other orders  -     T4, Free      Other than changes above, continue current medications and maintain follow up with specialists.      No follow-ups on file.   Recent Results (from the past 2016 hour(s))   TSH    Collection Time: 12/04/23  3:30 PM   Result Value Ref  Range    TSH 5.110 (H) 0.400 - 4.000 uIU/mL   T4, Free    Collection Time: 12/04/23  3:30 PM   Result Value Ref Range    Free T4 0.84 0.71 - 1.51 ng/dL   Comprehensive Metabolic Panel    Collection Time: 12/04/23  4:10 PM   Result Value Ref Range    Sodium 140 136 - 145 mmol/L    Potassium 3.7 3.5 - 5.1 mmol/L    Chloride 106 95 - 110 mmol/L    CO2 29 23 - 29 mmol/L    Glucose 175 (H) 70 - 110 mg/dL    BUN 8 6 - 20 mg/dL    Creatinine 0.9 0.5 - 1.4 mg/dL    Calcium 8.8 8.7 - 10.5 mg/dL    Total Protein 7.6 6.0 - 8.4 g/dL    Albumin 3.6 3.5 - 5.2 g/dL    Total Bilirubin 0.2 0.1 - 1.0 mg/dL    Alkaline Phosphatase 153 (H) 55 - 135 U/L    AST 58 (H) 10 - 40 U/L    ALT 73 (H) 10 - 44 U/L    eGFR >60.0 >60 mL/min/1.73 m^2    Anion Gap 5 3 - 11 mmol/L   Magnesium    Collection Time: 12/04/23  4:10 PM   Result Value Ref Range    Magnesium 2.3 1.6 - 2.6 mg/dL     Kazumi G Yoshinaga, DO [facility]        ROS

## 2023-12-05 NOTE — ASSESSMENT & PLAN NOTE
BP Readings from Last 3 Encounters:   12/05/23 (!) 155/92   05/08/23 (!) 162/100   04/26/23 128/78   Elevated BP, per patient did not take her medications as she was in a hurry to get to clinic appointment.   Denies symptoms. Counseled at length on the importance of taking her medications at the same time of the day every day.   Keep pill box to keep track of daily/weekly medications to take.   - f/u two weeks with BP check

## 2023-12-07 DIAGNOSIS — E03.9 HYPOTHYROIDISM, UNSPECIFIED TYPE: Primary | ICD-10-CM

## 2023-12-07 RX ORDER — LEVOTHYROXINE SODIUM 112 UG/1
112 TABLET ORAL
Qty: 30 TABLET | Refills: 2 | Status: SHIPPED | OUTPATIENT
Start: 2023-12-07 | End: 2024-01-27

## 2023-12-12 ENCOUNTER — OFFICE VISIT (OUTPATIENT)
Dept: PRIMARY CARE CLINIC | Facility: CLINIC | Age: 43
End: 2023-12-12
Payer: MEDICAID

## 2023-12-12 VITALS
RESPIRATION RATE: 16 BRPM | HEART RATE: 89 BPM | OXYGEN SATURATION: 99 % | HEIGHT: 63 IN | WEIGHT: 202 LBS | TEMPERATURE: 98 F | SYSTOLIC BLOOD PRESSURE: 134 MMHG | DIASTOLIC BLOOD PRESSURE: 79 MMHG | BODY MASS INDEX: 35.79 KG/M2

## 2023-12-12 DIAGNOSIS — H66.004 RECURRENT ACUTE SUPPURATIVE OTITIS MEDIA OF RIGHT EAR WITHOUT SPONTANEOUS RUPTURE OF TYMPANIC MEMBRANE: Primary | ICD-10-CM

## 2023-12-12 DIAGNOSIS — F33.0 RECURRENT MILD MAJOR DEPRESSIVE DISORDER WITH ANXIETY: ICD-10-CM

## 2023-12-12 DIAGNOSIS — M25.561 PAIN AND SWELLING OF RIGHT KNEE: ICD-10-CM

## 2023-12-12 DIAGNOSIS — F41.9 RECURRENT MILD MAJOR DEPRESSIVE DISORDER WITH ANXIETY: ICD-10-CM

## 2023-12-12 DIAGNOSIS — M25.561 ACUTE PAIN OF RIGHT KNEE: ICD-10-CM

## 2023-12-12 DIAGNOSIS — M25.461 PAIN AND SWELLING OF RIGHT KNEE: ICD-10-CM

## 2023-12-12 DIAGNOSIS — E78.01 FAMILIAL HYPERCHOLESTEROLEMIA: ICD-10-CM

## 2023-12-12 DIAGNOSIS — E66.9 CLASS 1 OBESITY WITH SERIOUS COMORBIDITY AND BODY MASS INDEX (BMI) OF 34.0 TO 34.9 IN ADULT, UNSPECIFIED OBESITY TYPE: ICD-10-CM

## 2023-12-12 PROCEDURE — 3075F SYST BP GE 130 - 139MM HG: CPT | Mod: CPTII,,, | Performed by: STUDENT IN AN ORGANIZED HEALTH CARE EDUCATION/TRAINING PROGRAM

## 2023-12-12 PROCEDURE — 99999 PR PBB SHADOW E&M-EST. PATIENT-LVL V: CPT | Mod: PBBFAC,,, | Performed by: STUDENT IN AN ORGANIZED HEALTH CARE EDUCATION/TRAINING PROGRAM

## 2023-12-12 PROCEDURE — 3078F DIAST BP <80 MM HG: CPT | Mod: CPTII,,, | Performed by: STUDENT IN AN ORGANIZED HEALTH CARE EDUCATION/TRAINING PROGRAM

## 2023-12-12 PROCEDURE — 3008F BODY MASS INDEX DOCD: CPT | Mod: CPTII,,, | Performed by: STUDENT IN AN ORGANIZED HEALTH CARE EDUCATION/TRAINING PROGRAM

## 2023-12-12 PROCEDURE — 99214 OFFICE O/P EST MOD 30 MIN: CPT | Mod: S$PBB,,, | Performed by: STUDENT IN AN ORGANIZED HEALTH CARE EDUCATION/TRAINING PROGRAM

## 2023-12-12 PROCEDURE — 99215 OFFICE O/P EST HI 40 MIN: CPT | Mod: PBBFAC,PO | Performed by: STUDENT IN AN ORGANIZED HEALTH CARE EDUCATION/TRAINING PROGRAM

## 2023-12-12 RX ORDER — AMOXICILLIN AND CLAVULANATE POTASSIUM 875; 125 MG/1; MG/1
1 TABLET, FILM COATED ORAL EVERY 12 HOURS
Qty: 14 TABLET | Refills: 0 | Status: SHIPPED | OUTPATIENT
Start: 2023-12-12 | End: 2023-12-19

## 2023-12-12 NOTE — PROGRESS NOTES
[facility] Note    HPI:  Sadaf Dhaliwal is a 43 y.o. female who presents today for Knee Pain (Right knee pain.  Patient states she had a right knee injury in 2014 and she states that since then her knee hurts on and off with swelling sometimes)    ROS   A review of systems was performed and was negative except as noted above.    I personally reviewed allergies, past medical, surgical, social and family history and updated as appropriate.    Medications:    Current Outpatient Medications:     adalimumab (HUMIRA PEN CROHNS-UC-HS START) 40 mg/0.8 mL PnKt, , Disp: , Rfl:     amitriptyline (ELAVIL) 75 MG tablet, Take 75 mg by mouth every evening., Disp: , Rfl:     amLODIPine (NORVASC) 5 MG tablet, TAKE ONE TABLET BY MOUTH ONCE DAILY (AT THE SAME TIME EVERYDAY) TO CONTROL BLOOD PRESSURE. THANK YOU!!!, Disp: , Rfl:     azaTHIOprine (IMURAN) 50 mg Tab, , Disp: , Rfl:     baclofen (LIORESAL) 10 MG tablet, Take 10 mg by mouth 2 (two) times daily., Disp: , Rfl:     EMGALITY  mg/mL PnIj, Inject into the skin., Disp: , Rfl:     estrogen, conjugated,-medroxyprogesterone 0.3-1.5 mg (PREMPRO) 0.3-1.5 mg per tablet, Take 1 tablet by mouth once daily., Disp: 30 tablet, Rfl: 3    hydroCHLOROthiazide (HYDRODIURIL) 12.5 MG Tab, Take 1 tablet (12.5 mg total) by mouth once daily. **THANK YOU**, Disp: 30 tablet, Rfl: 2    levothyroxine (SYNTHROID) 112 MCG tablet, Take 1 tablet (112 mcg total) by mouth before breakfast., Disp: 30 tablet, Rfl: 2    rosuvastatin (CRESTOR) 40 MG Tab, Take 1 tablet (40 mg total) by mouth once daily., Disp: 30 tablet, Rfl: 5    valACYclovir (VALTREX) 500 MG tablet, Take 500 mg by mouth 2 (two) times daily., Disp: , Rfl:     ZEMBRACE SYMTOUCH 3 mg/0.5 mL PnIj, Inject into the skin., Disp: , Rfl:     levocetirizine (XYZAL) 5 MG tablet, TAKE ONE TABLET BY MOUTH EVERY EVENING **THANK YOU**, Disp: 30 tablet, Rfl: 2    losartan (COZAAR) 100 MG tablet, Take 1 tablet (100 mg total) by mouth  "once daily. **THANK YOU**, Disp: 30 tablet, Rfl: 2    propranoloL (INDERAL) 20 MG tablet, Take 1 tablet (20 mg total) by mouth once daily., Disp: 30 tablet, Rfl: 2     Health Maintenance:  Immunization History   Administered Date(s) Administered    Tdap 03/02/2023      Health Maintenance   Topic Date Due    Mammogram  03/14/2024    High Dose Statin  01/01/2025    TETANUS VACCINE  03/02/2033    Hepatitis C Screening  Completed    Lipid Panel  Completed     Health Maintenance Topics with due status: Not Due       Topic Last Completion Date    TETANUS VACCINE 03/02/2023    Cervical Cancer Screening 05/08/2023    Hemoglobin A1c (Diabetic Prevention Screening) 05/24/2023    High Dose Statin 01/01/2024     Health Maintenance Due   Topic Date Due    Pneumococcal Vaccines (Age 0-64) (1 - PCV) Never done    HIV Screening  Never done    Influenza Vaccine (1) Never done    Mammogram  03/14/2024       PHYSICAL EXAM:  Vitals:    12/12/23 1409   BP: 134/79   BP Location: Right arm   Patient Position: Sitting   BP Method: Large (Automatic)   Pulse: 89   Resp: 16   Temp: 97.7 °F (36.5 °C)   SpO2: 99%   Weight: 91.6 kg (202 lb)   Height: 5' 3" (1.6 m)     Body mass index is 35.78 kg/m².  Physical Exam  Vitals reviewed.   Constitutional:       General: She is not in acute distress.     Appearance: Normal appearance. She is normal weight. She is not ill-appearing or toxic-appearing.      Comments: Poor hygiene   HENT:      Head: Normocephalic and atraumatic.      Right Ear: External ear normal.      Left Ear: External ear normal.      Nose: Nose normal.      Mouth/Throat:      Pharynx: Oropharynx is clear.      Comments: Poor dentition, multiple caries, left molar chipped tooth, pulp exposed, gum swelling  Eyes:      Extraocular Movements: Extraocular movements intact.      Conjunctiva/sclera: Conjunctivae normal.   Cardiovascular:      Rate and Rhythm: Normal rate and regular rhythm.      Pulses: Normal pulses.      Heart sounds: " Normal heart sounds.   Pulmonary:      Effort: Pulmonary effort is normal. No respiratory distress.      Breath sounds: No stridor. No wheezing, rhonchi or rales.   Abdominal:      General: Abdomen is flat. There is no distension.      Palpations: Abdomen is soft. There is no mass.      Tenderness: There is no abdominal tenderness. There is no right CVA tenderness, left CVA tenderness or guarding.   Musculoskeletal:         General: No swelling or tenderness. Normal range of motion.      Cervical back: Normal range of motion and neck supple. No rigidity or tenderness.      Right lower leg: No edema.      Left lower leg: No edema.   Lymphadenopathy:      Cervical: No cervical adenopathy.   Skin:     General: Skin is warm and dry.      Capillary Refill: Capillary refill takes less than 2 seconds.      Coloration: Skin is not pale.      Findings: Erythema and lesion present. No bruising or rash.      Comments: Right hand two puncture wounds scabbed, no bleeding, no purulent drainage. Mild tenderness over wound site, erythema localized to wound sites.    Neurological:      General: No focal deficit present.      Mental Status: She is alert and oriented to person, place, and time. Mental status is at baseline.      Motor: No weakness.      Gait: Gait normal.   Psychiatric:         Mood and Affect: Mood normal.         Behavior: Behavior normal.         Thought Content: Thought content normal.         Judgment: Judgment normal.            ASSESSMENT/PLAN:  1. Recurrent acute suppurative otitis media of right ear without spontaneous rupture of tympanic membrane  -     amoxicillin-clavulanate 875-125mg (AUGMENTIN) 875-125 mg per tablet; Take 1 tablet by mouth every 12 (twelve) hours. for 7 days  Dispense: 14 tablet; Refill: 0    2. Pain and swelling of right knee  -     Cancel: X-Ray Knee Complete 4 Or More Views Right; Future; Expected date: 12/12/2023  -     X-Ray Knee 3 View Right; Future; Expected date: 12/12/2023  -      Ambulatory referral/consult to Orthopedics; Future; Expected date: 12/19/2023    3. Class 1 obesity with serious comorbidity and body mass index (BMI) of 34.0 to 34.9 in adult, unspecified obesity type  Overview:  Wt Readings from Last 8 Encounters:   12/12/23 91.6 kg (202 lb)   12/05/23 93.9 kg (207 lb)   05/08/23 90.7 kg (200 lb)   04/26/23 89.8 kg (198 lb)   03/30/23 87.5 kg (193 lb)   03/14/23 89.4 kg (197 lb)   03/02/23 89.4 kg (197 lb)   02/16/23 88.5 kg (195 lb)       Assessment & Plan:  Recommendations:   Stay physically active. As tolerated alternate resistance training with stretching and cardio. Goal of 150 minutes per week of moderate intensity activity or 7,500 - 10,000 steps per day. Follow the Mediterranean Diet. Include whole fresh fruits, vegetables, olive oil, seeds, nuts, whole grains, cold water fish, salmon, mackerel and lean cuts of meat.  Do not drink sugary/diet carbonated beverages. Decrease portion sizes slightly which will result in an approximately 500-calorie deficit. Avoid fast or fried and processed food, especially canned foods. Avoid refined carbohydrates, white starchy foods, flour, white potato, bread, muffins, and cakes. Consider substituting one meal a day with a meal replacement such as Slim fast, lean cuisine, or weight watcher's. Follow a healthy diet that includes enough calcium, vitamin D and proteins for bone health.        4. Recurrent mild major depressive disorder with anxiety  Overview:  In the past took  - Deysrel, Paxil, and Buspar  Now taking daily,   - Amitriptyline 75 mg  - Venlafaxine 225 mg        Assessment & Plan:  Patient has persistent depression which is moderate and is currently controlled. Will  adjust  anti-depressant medications. Patient does not display psychosis at this time. Continue to monitor closely and adjust plan of care as needed.  - continue venlafaxine 150 mg daily  - continue amitriptyline 75 mg daily  - practice good sleep hygiene as  discussed              5. Familial hypercholesterolemia  Assessment & Plan:  Patient with strong family history of CAD. Follow up cardiology.   - rosuvastatin 40 mg daily       6. Acute pain of right knee  Assessment & Plan:  No trauma, no recent falls. No effusion, no excessive warmth to touch, no crepitus, tender to palpation over medial joint line. Continue with supportive management. Agrees to meeting with specialist.   - f/u referral orthopedic specialist   - heating pad application to site of pain, 15-20 minutes 2-3 times daily for pain relief and promote circulation  - when pain well controlled, then keep joints mobile  - massage the tender areas as tolerated  - avoid all activities that are provocative or painful to area of tenderness            Other than changes above, continue current medications and maintain follow up with specialists.      No follow-ups on file.   Recent Results (from the past 2016 hour(s))   TSH    Collection Time: 12/04/23  3:30 PM   Result Value Ref Range    TSH 5.110 (H) 0.400 - 4.000 uIU/mL   T4, Free    Collection Time: 12/04/23  3:30 PM   Result Value Ref Range    Free T4 0.84 0.71 - 1.51 ng/dL   Comprehensive Metabolic Panel    Collection Time: 12/04/23  4:10 PM   Result Value Ref Range    Sodium 140 136 - 145 mmol/L    Potassium 3.7 3.5 - 5.1 mmol/L    Chloride 106 95 - 110 mmol/L    CO2 29 23 - 29 mmol/L    Glucose 175 (H) 70 - 110 mg/dL    BUN 8 6 - 20 mg/dL    Creatinine 0.9 0.5 - 1.4 mg/dL    Calcium 8.8 8.7 - 10.5 mg/dL    Total Protein 7.6 6.0 - 8.4 g/dL    Albumin 3.6 3.5 - 5.2 g/dL    Total Bilirubin 0.2 0.1 - 1.0 mg/dL    Alkaline Phosphatase 153 (H) 55 - 135 U/L    AST 58 (H) 10 - 40 U/L    ALT 73 (H) 10 - 44 U/L    eGFR >60.0 >60 mL/min/1.73 m^2    Anion Gap 5 3 - 11 mmol/L   Magnesium    Collection Time: 12/04/23  4:10 PM   Result Value Ref Range    Magnesium 2.3 1.6 - 2.6 mg/dL         Kazumi G Yoshinaga, DO [facility]

## 2023-12-22 DIAGNOSIS — M25.561 ACUTE PAIN OF RIGHT KNEE: Primary | ICD-10-CM

## 2023-12-26 DIAGNOSIS — R09.81 NOSE CONGESTION: ICD-10-CM

## 2023-12-26 DIAGNOSIS — J30.89 ENVIRONMENTAL AND SEASONAL ALLERGIES: ICD-10-CM

## 2023-12-26 DIAGNOSIS — I10 PRIMARY HYPERTENSION: ICD-10-CM

## 2023-12-30 RX ORDER — LOSARTAN POTASSIUM 100 MG/1
TABLET ORAL
Qty: 30 TABLET | Refills: 2 | Status: SHIPPED | OUTPATIENT
Start: 2023-12-30 | End: 2024-03-26

## 2023-12-30 RX ORDER — LEVOCETIRIZINE DIHYDROCHLORIDE 5 MG/1
TABLET, FILM COATED ORAL
Qty: 30 TABLET | Refills: 2 | Status: SHIPPED | OUTPATIENT
Start: 2023-12-30 | End: 2024-03-26

## 2024-01-02 PROBLEM — U07.1 PNEUMONIA DUE TO COVID-19 VIRUS: Status: RESOLVED | Noted: 2021-08-19 | Resolved: 2024-01-02

## 2024-01-02 PROBLEM — M25.569 ACUTE KNEE PAIN: Status: ACTIVE | Noted: 2024-01-02

## 2024-01-02 PROBLEM — J12.82 PNEUMONIA DUE TO COVID-19 VIRUS: Status: RESOLVED | Noted: 2021-08-19 | Resolved: 2024-01-02

## 2024-01-02 NOTE — ASSESSMENT & PLAN NOTE
No trauma, no recent falls. No effusion, no excessive warmth to touch, no crepitus, tender to palpation over medial joint line. Continue with supportive management. Agrees to meeting with specialist.   - f/u referral orthopedic specialist   - heating pad application to site of pain, 15-20 minutes 2-3 times daily for pain relief and promote circulation  - when pain well controlled, then keep joints mobile  - massage the tender areas as tolerated  - avoid all activities that are provocative or painful to area of tenderness

## 2024-01-02 NOTE — ASSESSMENT & PLAN NOTE
Lab Results   Component Value Date    TSH 5.110 (H) 12/04/2023    [id number] 7.2 05/24/2023    FREET4 0.84 12/04/2023   Counseled at length on importance of taking her medication on empty stomach at the same time of the day, every day.   Continue with current levothyroxine 88 mcg daily.   - f/u 3 months, repeat TSH

## 2024-01-02 NOTE — ASSESSMENT & PLAN NOTE
Patient has persistent depression which is moderate and is currently controlled. Will  adjust  anti-depressant medications. Patient does not display psychosis at this time. Continue to monitor closely and adjust plan of care as needed.  - continue venlafaxine 150 mg daily  - continue amitriptyline 75 mg daily  - practice good sleep hygiene as discussed

## 2024-01-02 NOTE — ASSESSMENT & PLAN NOTE
Continue pravastatin 40 mg daily  Lab Results   Component Value Date    CHOL 132 05/24/2023    CHOL 284 (H) 02/16/2023     Lab Results   Component Value Date    HDL 40 05/24/2023    HDL 36 (L) 02/16/2023     Lab Results   Component Value Date    LDLCALC 68.0 05/24/2023    LDLCALC 208.6 (H) 02/16/2023     Lab Results   Component Value Date    TRIG 120 05/24/2023    TRIG 197 (H) 02/16/2023       Lab Results   Component Value Date    CHOLHDL 30.3 05/24/2023    CHOLHDL 12.7 (L) 02/16/2023

## 2024-01-09 ENCOUNTER — OFFICE VISIT (OUTPATIENT)
Dept: ORTHOPEDICS | Facility: CLINIC | Age: 44
End: 2024-01-09
Payer: MEDICAID

## 2024-01-09 ENCOUNTER — HOSPITAL ENCOUNTER (OUTPATIENT)
Dept: RADIOLOGY | Facility: HOSPITAL | Age: 44
Discharge: HOME | End: 2024-01-09
Attending: NURSE PRACTITIONER
Payer: MEDICAID

## 2024-01-09 DIAGNOSIS — M25.561 ACUTE PAIN OF RIGHT KNEE: ICD-10-CM

## 2024-01-09 DIAGNOSIS — M25.461 PAIN AND SWELLING OF RIGHT KNEE: ICD-10-CM

## 2024-01-09 DIAGNOSIS — M25.561 PAIN AND SWELLING OF RIGHT KNEE: ICD-10-CM

## 2024-01-09 PROCEDURE — 99999 PR PBB SHADOW E&M-EST. PATIENT-LVL III: CPT | Mod: PBBFAC,,, | Performed by: NURSE PRACTITIONER

## 2024-01-09 PROCEDURE — 1160F RVW MEDS BY RX/DR IN RCRD: CPT | Mod: CPTII,,, | Performed by: NURSE PRACTITIONER

## 2024-01-09 PROCEDURE — 1159F MED LIST DOCD IN RCRD: CPT | Mod: CPTII,,, | Performed by: NURSE PRACTITIONER

## 2024-01-09 PROCEDURE — 73562 X-RAY EXAM OF KNEE 3: CPT | Mod: TC,RT

## 2024-01-09 PROCEDURE — 99213 OFFICE O/P EST LOW 20 MIN: CPT | Mod: PBBFAC | Performed by: NURSE PRACTITIONER

## 2024-01-09 PROCEDURE — 99203 OFFICE O/P NEW LOW 30 MIN: CPT | Mod: S$PBB,,, | Performed by: NURSE PRACTITIONER

## 2024-01-09 NOTE — PROGRESS NOTES
Subjective:      Sadaf Dhaliwal is a 43 y.o. female referred by Dr. Conway for evaluation and treatment of right knee pain. This is evaluated as a non-injury. She states onset has been off and on over the past several years. She states that she has lateral sided knee pain and notes at times  localized swelling to the lateral side of the knee. She states that she seen orthopedics years ago. She presents and rates her pain as 3/10, varies in intensity at times. The pain's location is lateral. She describes the symptoms as sharp and throbbing at times. Symptoms have improve minimal with rest and Tylenol. Symptoms worsen with activity, getting up from a chair, weight bearing and sitting for prolonged periods of time. The knee has given out or felt unstable at times. She denies any locking and reports intermittent swelling. The patient can not bend and straighten the knee fully at times due to pain. Treatment to date has been ice and Tylenol without significant relief. She is noted with a mild limp. She has not had any recent therapy or injections. She uses no assistive devices to aid in ambulation.      Outside reports reviewed: office note      Review of Systems  Constitutional: Negative.  Negative for fever.  Musculoskeletal: Positive for joint pain.  Skin: Negative.    Neurological: Negative.    Psychiatric/Behavioral: Anxiety, depression  All other systems reviewed and are negative.     Objective:      NVI  General :   alert, appears stated age and cooperative   Gait: Antalgic   Right Lower Extremity  Hip Palpation:  no tenderness over the greater  trochanter   Hip ROM: 100% of normal   Hamstring tightness is none   Knee Effusion:  trace   Ecchymosis:  none   Knee ROM:  0 to 120 degrees with subpatellar crepitance.Pain with forced flexion.    Patella:  Patella does track normally.  Patellar apprehension test: negative  Patellar compression test: negative   Tenderness: Lateral joint line   Stability:   Lachman's test: negative  Posterior drawer: negative  Medial collateral ligament: negative  Lateral collateral ligament: guarded   Yoel Test:  negative   Griselda's Test:  guarded   Sensation:   intact to light touch   Pulses: normal DP and PT pulses.    Contralateral knee was without deficit.  Brisk cap refill.      Imaging  Radiographs ordered and reviewed RT knee by me today.   No acute fracture or dislocation detected.  No evidence of a knee joint effusion.  No significant osteoarthritic change.      Assessment:      RT knee pain and mild DJD     Plan:      1. Start directed physician guided exercises for ROM and strengthening- [facility] knee conditioning packet given with exercises reviewed. HEP 69390 - She was instructed and demonstrated exercises per [facility] knee rehab exercises for HEP which include Heel cord stretch, standing quad stretch, supine hamstring stretch, half squats, hamstring curls, calf raises, leg extensions, straight leg raises, hip abduction, hip adduction and  leg presses. The patient then demonstrated understanding of exercises and proper technique. This program was performed for 15 minutes. Will be done 3 x wk x 6 wks.   2. No NSAIDS due to having Crohns. Tylenol and Biofreeze as directed.   3. Ice and limit stairs and squatting. Recommend compression.   4. Declines steroid injection, states she does not tolerate steroids.   5. Follow up: 6 weeks, will get MRI if needed.   6. She had no further questions. .

## 2024-01-25 DIAGNOSIS — E03.9 HYPOTHYROIDISM, UNSPECIFIED TYPE: ICD-10-CM

## 2024-01-27 RX ORDER — LEVOTHYROXINE SODIUM 112 UG/1
TABLET ORAL
Qty: 30 TABLET | Refills: 2 | Status: SHIPPED | OUTPATIENT
Start: 2024-01-27 | End: 2024-04-25

## 2024-02-05 ENCOUNTER — OFFICE VISIT (OUTPATIENT)
Dept: PRIMARY CARE CLINIC | Facility: CLINIC | Age: 44
End: 2024-02-05
Payer: MEDICAID

## 2024-02-05 VITALS
BODY MASS INDEX: 36.14 KG/M2 | DIASTOLIC BLOOD PRESSURE: 60 MMHG | TEMPERATURE: 97 F | HEART RATE: 87 BPM | SYSTOLIC BLOOD PRESSURE: 100 MMHG | RESPIRATION RATE: 16 BRPM | WEIGHT: 204 LBS | OXYGEN SATURATION: 96 % | HEIGHT: 63 IN

## 2024-02-05 DIAGNOSIS — E03.9 HYPOTHYROIDISM, UNSPECIFIED TYPE: Primary | ICD-10-CM

## 2024-02-05 DIAGNOSIS — J30.89 ENVIRONMENTAL AND SEASONAL ALLERGIES: ICD-10-CM

## 2024-02-05 DIAGNOSIS — H66.91 CHRONIC EAR INFECTION, RIGHT: ICD-10-CM

## 2024-02-05 DIAGNOSIS — F17.218 CIGARETTE NICOTINE DEPENDENCE WITH OTHER NICOTINE-INDUCED DISORDER: Chronic | ICD-10-CM

## 2024-02-05 DIAGNOSIS — E66.9 CLASS 1 OBESITY WITH SERIOUS COMORBIDITY AND BODY MASS INDEX (BMI) OF 34.0 TO 34.9 IN ADULT, UNSPECIFIED OBESITY TYPE: ICD-10-CM

## 2024-02-05 DIAGNOSIS — F90.0 ATTENTION DEFICIT HYPERACTIVITY DISORDER (ADHD), PREDOMINANTLY INATTENTIVE TYPE: Chronic | ICD-10-CM

## 2024-02-05 DIAGNOSIS — F41.9 RECURRENT MILD MAJOR DEPRESSIVE DISORDER WITH ANXIETY: ICD-10-CM

## 2024-02-05 DIAGNOSIS — K08.9 POOR DENTITION: ICD-10-CM

## 2024-02-05 DIAGNOSIS — R73.03 PREDIABETES: ICD-10-CM

## 2024-02-05 DIAGNOSIS — F33.0 RECURRENT MILD MAJOR DEPRESSIVE DISORDER WITH ANXIETY: ICD-10-CM

## 2024-02-05 DIAGNOSIS — E78.5 HYPERLIPIDEMIA, UNSPECIFIED HYPERLIPIDEMIA TYPE: ICD-10-CM

## 2024-02-05 DIAGNOSIS — H61.22 EXCESSIVE CERUMEN IN LEFT EAR CANAL: ICD-10-CM

## 2024-02-05 PROBLEM — F17.200 NICOTINE DEPENDENCE: Chronic | Status: ACTIVE | Noted: 2024-02-05

## 2024-02-05 PROBLEM — F90.9 ADHD (ATTENTION DEFICIT HYPERACTIVITY DISORDER): Chronic | Status: ACTIVE | Noted: 2024-02-05

## 2024-02-05 PROCEDURE — 3078F DIAST BP <80 MM HG: CPT | Mod: CPTII,,, | Performed by: STUDENT IN AN ORGANIZED HEALTH CARE EDUCATION/TRAINING PROGRAM

## 2024-02-05 PROCEDURE — 99999 PR PBB SHADOW E&M-EST. PATIENT-LVL V: CPT | Mod: PBBFAC,,, | Performed by: STUDENT IN AN ORGANIZED HEALTH CARE EDUCATION/TRAINING PROGRAM

## 2024-02-05 PROCEDURE — 1159F MED LIST DOCD IN RCRD: CPT | Mod: CPTII,,, | Performed by: STUDENT IN AN ORGANIZED HEALTH CARE EDUCATION/TRAINING PROGRAM

## 2024-02-05 PROCEDURE — 4010F ACE/ARB THERAPY RXD/TAKEN: CPT | Mod: CPTII,,, | Performed by: STUDENT IN AN ORGANIZED HEALTH CARE EDUCATION/TRAINING PROGRAM

## 2024-02-05 PROCEDURE — 99215 OFFICE O/P EST HI 40 MIN: CPT | Mod: PBBFAC | Performed by: STUDENT IN AN ORGANIZED HEALTH CARE EDUCATION/TRAINING PROGRAM

## 2024-02-05 PROCEDURE — 3008F BODY MASS INDEX DOCD: CPT | Mod: CPTII,,, | Performed by: STUDENT IN AN ORGANIZED HEALTH CARE EDUCATION/TRAINING PROGRAM

## 2024-02-05 PROCEDURE — 99214 OFFICE O/P EST MOD 30 MIN: CPT | Mod: S$PBB,,, | Performed by: STUDENT IN AN ORGANIZED HEALTH CARE EDUCATION/TRAINING PROGRAM

## 2024-02-05 PROCEDURE — 3074F SYST BP LT 130 MM HG: CPT | Mod: CPTII,,, | Performed by: STUDENT IN AN ORGANIZED HEALTH CARE EDUCATION/TRAINING PROGRAM

## 2024-02-05 RX ORDER — ROSUVASTATIN CALCIUM 40 MG/1
40 TABLET, COATED ORAL DAILY
Qty: 30 TABLET | Refills: 5 | Status: SHIPPED | OUTPATIENT
Start: 2024-02-05 | End: 2024-08-03

## 2024-02-05 RX ORDER — GUANFACINE 1 MG/1
1 TABLET, EXTENDED RELEASE ORAL
COMMUNITY
Start: 2024-01-11 | End: 2024-04-25

## 2024-02-05 NOTE — ASSESSMENT & PLAN NOTE
Lab Results   Component Value Date    TSH 5.110 (H) 12/04/2023    [id number] 7.2 05/24/2023    FREET4 0.84 12/04/2023   Counseled at length on importance of taking her medication on empty stomach at the same time of the day, every day.   Continue with current levothyroxine 112 mcg daily.   - f/u TSH

## 2024-02-05 NOTE — PROGRESS NOTES
[facility]    HPI:  Sadaf Dhaliwal is a 43 y.o. female who presents today for Follow-up (Hypothyroidism after adjustment to levothyroxine. Patient denies symptoms of excessive fatigue, skin, hair changes, appetite changes, weight gain or weight loss.  )  Also complains of intermittent ear pain and fullness affecting especially her right ear. Today, ear pain at minimum.   Has never been evaluated by an ENT in the past.   Patient does not tolerate Flonase as it dries her up too much.   Interim visit with  specialist; recently started taking guanfacine 1 mg daily for ADHD.   Has been keeping up with her school work.   Mood stable, denies SI/HI, harm to self or others.  Has also recently followed up with her primary neurologist.   Will follow up notes.      ROS   A review of systems was performed and was negative except as noted above.    I personally reviewed allergies, past medical, surgical, social and family history and updated as appropriate.    Medications:    Current Outpatient Medications:     adalimumab (HUMIRA PEN CROHNS-[facility]-HS START) 40 mg/0.8 mL PnKt, , Disp: , Rfl:     amitriptyline (ELAVIL) 75 MG tablet, Take 75 mg by mouth every evening., Disp: , Rfl:     amLODIPine (NORVASC) 5 MG tablet, TAKE ONE TABLET BY MOUTH ONCE DAILY (AT THE SAME TIME EVERYDAY) TO CONTROL BLOOD PRESSURE. THANK YOU!!!, Disp: , Rfl:     azaTHIOprine (IMURAN) 50 mg Tab, , Disp: , Rfl:     baclofen (LIORESAL) 10 MG tablet, Take 10 mg by mouth 2 (two) times daily., Disp: , Rfl:     EMGALITY  mg/mL PnIj, Inject into the skin., Disp: , Rfl:     estrogen, conjugated,-medroxyprogesterone 0.3-1.5 mg (PREMPRO) 0.3-1.5 mg per tablet, Take 1 tablet by mouth once daily., Disp: 30 tablet, Rfl: 3    guanFACINE 1 mg Tb24, Take 1 tablet by mouth., Disp: , Rfl:     hydroCHLOROthiazide (HYDRODIURIL) 12.5 MG Tab, Take 1 tablet (12.5 mg total) by mouth once daily. **THANK YOU**, Disp: 30 tablet, Rfl: 2     "levocetirizine (XYZAL) 5 MG tablet, TAKE ONE TABLET BY MOUTH EVERY EVENING **THANK YOU**, Disp: 30 tablet, Rfl: 2    levothyroxine (SYNTHROID) 112 MCG tablet, TAKE ONE TABLET BY MOUTH DAILY before breakfast **THANK YOU**, Disp: 30 tablet, Rfl: 2    losartan (COZAAR) 100 MG tablet, Take 1 tablet (100 mg total) by mouth once daily. **THANK YOU**, Disp: 30 tablet, Rfl: 2    valACYclovir (VALTREX) 500 MG tablet, Take 500 mg by mouth 2 (two) times daily., Disp: , Rfl:     ZEMBRACE SYMTOUCH 3 mg/0.5 mL PnIj, Inject into the skin., Disp: , Rfl:     carbamide peroxide (DEBROX) 6.5 % otic solution, Place 5 drops into the left ear 2 (two) times daily. for 14 days, Disp: 18 mL, Rfl: 0    propranoloL (INDERAL) 20 MG tablet, Take 1 tablet (20 mg total) by mouth once daily., Disp: 30 tablet, Rfl: 2    rosuvastatin (CRESTOR) 40 MG Tab, Take 1 tablet (40 mg total) by mouth once daily., Disp: 30 tablet, Rfl: 5     Health Maintenance:  Immunization History   Administered Date(s) Administered    Tdap 03/02/2023      Health Maintenance   Topic Date Due    Mammogram  03/14/2024    High Dose Statin  02/05/2025    TETANUS VACCINE  03/02/2033    Hepatitis C Screening  Completed    Lipid Panel  Completed     Health Maintenance Topics with due status: Not Due       Topic Last Completion Date    TETANUS VACCINE 03/02/2023    Cervical Cancer Screening 05/08/2023    Hemoglobin A1c (Prediabetes) 05/24/2023    High Dose Statin 02/05/2024     Health Maintenance Due   Topic Date Due    Pneumococcal Vaccines (Age 0-64) (1 of 2 - PCV) Never done    HIV Screening  Never done    Influenza Vaccine (1) Never done    Mammogram  03/14/2024       PHYSICAL EXAM:  Vitals:    02/05/24 1506   BP: 100/60   BP Location: Left arm   Patient Position: Sitting   BP Method: Large (Automatic)   Pulse: 87   Resp: 16   Temp: 97.2 °F (36.2 °C)   SpO2: 96%   Weight: 92.5 kg (204 lb)   Height: 5' 3" (1.6 m)     Body mass index is 36.14 kg/m².  Physical Exam  Vitals " reviewed.   Constitutional:       General: She is not in acute distress.     Appearance: Normal appearance. She is normal weight. She is not ill-appearing or toxic-appearing.   HENT:      Head: Normocephalic and atraumatic.      Right Ear: External ear normal.      Ears:      Comments: Left ear with cerumen surrounding ear canal, TM non bulging, non erythematous. Right ear with TM scarring, small fluid line     Nose: Congestion present.      Mouth/Throat:      Pharynx: Oropharynx is clear.      Comments: Poor dentition, multiple caries, left molar chipped tooth, pulp exposed, gum swelling  Eyes:      Extraocular Movements: Extraocular movements intact.      Conjunctiva/sclera: Conjunctivae normal.   Cardiovascular:      Rate and Rhythm: Normal rate and regular rhythm.      Pulses: Normal pulses.      Heart sounds: Normal heart sounds.   Pulmonary:      Effort: Pulmonary effort is normal. No respiratory distress.      Breath sounds: No stridor. No wheezing, rhonchi or rales.   Abdominal:      General: Abdomen is flat. There is no distension.      Palpations: Abdomen is soft. There is no mass.      Tenderness: There is no abdominal tenderness. There is no right CVA tenderness, left CVA tenderness or guarding.   Musculoskeletal:         General: No swelling or tenderness. Normal range of motion.      Cervical back: Normal range of motion and neck supple. No rigidity or tenderness.      Right lower leg: No edema.      Left lower leg: No edema.   Lymphadenopathy:      Cervical: No cervical adenopathy.   Skin:     General: Skin is warm and dry.      Capillary Refill: Capillary refill takes less than 2 seconds.      Coloration: Skin is not pale.      Findings: Erythema and lesion present. No bruising or rash.      Comments: Right hand two puncture wounds scabbed, no bleeding, no purulent drainage. Mild tenderness over wound site, erythema localized to wound sites.    Neurological:      General: No focal deficit present.       Mental Status: She is alert and oriented to person, place, and time. Mental status is at baseline.      Motor: No weakness.      Gait: Gait normal.   Psychiatric:         Mood and Affect: Mood normal.         Behavior: Behavior normal.         Thought Content: Thought content normal.         Judgment: Judgment normal.          ASSESSMENT/PLAN:  1. Hypothyroidism, unspecified type  Assessment & Plan:  Lab Results   Component Value Date    TSH 5.110 (H) 12/04/2023    [id number] 7.2 05/24/2023    FREET4 0.84 12/04/2023   Counseled at length on importance of taking her medication on empty stomach at the same time of the day, every day.   Continue with current levothyroxine 112 mcg daily.   - f/u TSH      Orders:  -     CBC Auto Differential; Future; Expected date: 02/05/2024    2. Hyperlipidemia, unspecified hyperlipidemia type  Assessment & Plan:  Tolerating current medications. Continue pravastatin 40 mg daily  Lab Results   Component Value Date    CHOL 132 05/24/2023    CHOL 284 (H) 02/16/2023     Lab Results   Component Value Date    HDL 40 05/24/2023    HDL 36 (L) 02/16/2023     Lab Results   Component Value Date    LDLCALC 68.0 05/24/2023    LDLCALC 208.6 (H) 02/16/2023     Lab Results   Component Value Date    TRIG 120 05/24/2023    TRIG 197 (H) 02/16/2023       Lab Results   Component Value Date    CHOLHDL 30.3 05/24/2023    CHOLHDL 12.7 (L) 02/16/2023          Orders:  -     rosuvastatin (CRESTOR) 40 MG Tab; Take 1 tablet (40 mg total) by mouth once daily.  Dispense: 30 tablet; Refill: 5    3. Chronic ear infection, right  Assessment & Plan:  Chronic congestion and frequent ear infection. Denies tinnitus at this time, hearing difficulties.  On exam no evidence of erythema, to bulging of TM, but small fluid line.   - declines use of flonase secondary to over drying and increased pain  - follow up referral ENT for examination and work up      Orders:  -     Ambulatory referral/consult to ENT; Future; Expected  date: 02/12/2024  -     CBC Auto Differential; Future; Expected date: 02/05/2024    4. Prediabetes  -     Hemoglobin A1C; Future; Expected date: 02/05/2024  -     Comprehensive Metabolic Panel; Future; Expected date: 02/05/2024  -     CBC Auto Differential; Future; Expected date: 02/05/2024    5. Class 1 obesity with serious comorbidity and body mass index (BMI) of 34.0 to 34.9 in adult, unspecified obesity type  Overview:  Wt Readings from Last 8 Encounters:   02/05/24 92.5 kg (204 lb)   12/12/23 91.6 kg (202 lb)   12/05/23 93.9 kg (207 lb)   05/08/23 90.7 kg (200 lb)   04/26/23 89.8 kg (198 lb)   03/30/23 87.5 kg (193 lb)   03/14/23 89.4 kg (197 lb)   03/02/23 89.4 kg (197 lb)       Assessment & Plan:  Recommendations:   Stay physically active. As tolerated alternate resistance training with stretching and cardio. Goal of 150 minutes per week of moderate intensity activity or 7,500 - 10,000 steps per day. Follow the Mediterranean Diet. Include whole fresh fruits, vegetables, olive oil, seeds, nuts, whole grains, cold water fish, salmon, mackerel and lean cuts of meat.  Do not drink sugary/diet carbonated beverages. Decrease portion sizes slightly which will result in an approximately 500-calorie deficit. Avoid fast or fried and processed food, especially canned foods. Avoid refined carbohydrates, white starchy foods, flour, white potato, bread, muffins, and cakes. Consider substituting one meal a day with a meal replacement such as Slim fast, lean cuisine, or weight watcher's. Follow a healthy diet that includes enough calcium, vitamin D and proteins for bone health.        6. Cigarette nicotine dependence with other nicotine-induced disorder  Assessment & Plan:  Last attempt to quit smoking 2 year ago, but could not continue. Currently not ready to stop smoking. Current smoking reduced to one pack per day. Understands the risks of continued smoking.   - encouraged smoking cessation      7. Excessive cerumen in  left ear canal  -     carbamide peroxide (DEBROX) 6.5 % otic solution; Place 5 drops into the left ear 2 (two) times daily. for 14 days  Dispense: 18 mL; Refill: 0    8. Poor dentition  -     Ambulatory referral/consult to Dentistry ([facility]); Future; Expected date: 02/12/2024    9. Recurrent mild major depressive disorder with anxiety  Overview:  In the past took  - Deysrel, Paxil, and Buspar  Now taking daily,   - Amitriptyline 75 mg  - Venlafaxine 225 mg        Assessment & Plan:  Patient has persistent depression which is moderate and is currently controlled. Will  adjust  anti-depressant medications. Patient does not display psychosis at this time. Continue to monitor closely and adjust plan of care as needed.  - continue venlafaxine 150 mg daily  - continue amitriptyline 75 mg daily  - practice good sleep hygiene as discussed              10. Attention deficit hyperactivity disorder (ADHD), predominantly inattentive type  Assessment & Plan:  Recently started on guanfacine 1 mg daily. Denies adverse symptoms at this time. Continue and follow up with  specialist.       11. Environmental and seasonal allergies  Assessment & Plan:  Patient with sensitivity to environmental allergen.   - nasal mucosal hyperemic  - continue zyxal   - f/u referral ENT              Other than changes above, continue current medications and maintain follow up with specialists.      Follow up in about 6 months (around 8/5/2024) for Med recheck, Lab review, BP check, Annual.   Recent Results (from the past 2016 hour(s))   TSH    Collection Time: 12/04/23  3:30 PM   Result Value Ref Range    TSH 5.110 (H) 0.400 - 4.000 uIU/mL   T4, Free    Collection Time: 12/04/23  3:30 PM   Result Value Ref Range    Free T4 0.84 0.71 - 1.51 ng/dL   Comprehensive Metabolic Panel    Collection Time: 12/04/23  4:10 PM   Result Value Ref Range    Sodium 140 136 - 145 mmol/L    Potassium 3.7 3.5 - 5.1 mmol/L    Chloride 106 95 - 110 mmol/L    CO2 29 23 -  29 mmol/L    Glucose 175 (H) 70 - 110 mg/dL    BUN 8 6 - 20 mg/dL    Creatinine 0.9 0.5 - 1.4 mg/dL    Calcium 8.8 8.7 - 10.5 mg/dL    Total Protein 7.6 6.0 - 8.4 g/dL    Albumin 3.6 3.5 - 5.2 g/dL    Total Bilirubin 0.2 0.1 - 1.0 mg/dL    Alkaline Phosphatase 153 (H) 55 - 135 U/L    AST 58 (H) 10 - 40 U/L    ALT 73 (H) 10 - 44 U/L    eGFR >60.0 >60 mL/min/1.73 m^2    Anion Gap 5 3 - 11 mmol/L   Magnesium    Collection Time: 12/04/23  4:10 PM   Result Value Ref Range    Magnesium 2.3 1.6 - 2.6 mg/dL         [facility]

## 2024-02-05 NOTE — ASSESSMENT & PLAN NOTE
Last attempt to quit smoking 2 year ago, but could not continue. Currently not ready to stop smoking. Current smoking reduced to one pack per day. Understands the risks of continued smoking.   - encouraged smoking cessation

## 2024-02-06 NOTE — ASSESSMENT & PLAN NOTE
Patient with sensitivity to environmental allergen.   - nasal mucosal hyperemic  - continue zyxal   - f/u referral ENT

## 2024-02-06 NOTE — ASSESSMENT & PLAN NOTE
Chronic congestion and frequent ear infection. Denies tinnitus at this time, hearing difficulties.  On exam no evidence of erythema, to bulging of TM, but small fluid line.   - declines use of flonase secondary to over drying and increased pain  - follow up referral ENT for examination and work up

## 2024-02-06 NOTE — ASSESSMENT & PLAN NOTE
Recently started on guanfacine 1 mg daily. Denies adverse symptoms at this time. Continue and follow up with  specialist.

## 2024-02-06 NOTE — ASSESSMENT & PLAN NOTE
Tolerating current medications. Continue pravastatin 40 mg daily  Lab Results   Component Value Date    CHOL 132 05/24/2023    CHOL 284 (H) 02/16/2023     Lab Results   Component Value Date    HDL 40 05/24/2023    HDL 36 (L) 02/16/2023     Lab Results   Component Value Date    LDLCALC 68.0 05/24/2023    LDLCALC 208.6 (H) 02/16/2023     Lab Results   Component Value Date    TRIG 120 05/24/2023    TRIG 197 (H) 02/16/2023       Lab Results   Component Value Date    CHOLHDL 30.3 05/24/2023    CHOLHDL 12.7 (L) 02/16/2023

## 2024-02-20 ENCOUNTER — OFFICE VISIT (OUTPATIENT)
Dept: ORTHOPEDICS | Facility: CLINIC | Age: 44
End: 2024-02-20
Payer: MEDICAID

## 2024-02-20 DIAGNOSIS — M25.561 ACUTE PAIN OF RIGHT KNEE: ICD-10-CM

## 2024-02-20 DIAGNOSIS — M25.361 KNEE INSTABILITY, RIGHT: ICD-10-CM

## 2024-02-20 DIAGNOSIS — M25.561 PAIN AND SWELLING OF RIGHT KNEE: Primary | ICD-10-CM

## 2024-02-20 DIAGNOSIS — M25.461 PAIN AND SWELLING OF RIGHT KNEE: Primary | ICD-10-CM

## 2024-02-20 PROCEDURE — 99213 OFFICE O/P EST LOW 20 MIN: CPT | Mod: S$PBB,,, | Performed by: NURSE PRACTITIONER

## 2024-02-20 PROCEDURE — 1160F RVW MEDS BY RX/DR IN RCRD: CPT | Mod: CPTII,,, | Performed by: NURSE PRACTITIONER

## 2024-02-20 PROCEDURE — 99213 OFFICE O/P EST LOW 20 MIN: CPT | Mod: PBBFAC | Performed by: NURSE PRACTITIONER

## 2024-02-20 PROCEDURE — 99999 PR PBB SHADOW E&M-EST. PATIENT-LVL III: CPT | Mod: PBBFAC,,, | Performed by: NURSE PRACTITIONER

## 2024-02-20 PROCEDURE — 4010F ACE/ARB THERAPY RXD/TAKEN: CPT | Mod: CPTII,,, | Performed by: NURSE PRACTITIONER

## 2024-02-20 PROCEDURE — 1159F MED LIST DOCD IN RCRD: CPT | Mod: CPTII,,, | Performed by: NURSE PRACTITIONER

## 2024-02-20 NOTE — PROGRESS NOTES
Subjective:      Follow up: RT Knee:     Sadaf Dhaliwal is a 43 y.o. female returns for follow up for right knee pain. She presents and continues with knee pain, states the knee has worsened since last visit. She reports increased swelling, giving way and decreased ROM. She is noted with a limp. She has completed 6 weeks of conservative treatment that provided no relief. She rates her pain as 7/10. The pain's location is lateral > medial as previous. Symptoms worsen with activity, getting up from a chair, weight bearing and sitting for prolonged periods of time. The knee has given out or felt unstable at times. She denies any locking. The patient can not bend and straighten the knee fully at times due to pain. She is unable to take Nsaids and has used Tylenol, ice, physician directed exercises and compression with no relief.     Review of Systems  Constitutional: Negative.  Negative for fever.  Musculoskeletal: Positive for joint pain.  Skin: Negative.    Neurological: Negative.    Psychiatric/Behavioral: Anxiety, depression  All other systems reviewed and are negative.     Objective:      NVI  General :   alert, appears stated age and cooperative   Gait: Antalgic   Right Lower Extremity  Hip Palpation:  no tenderness over the greater  trochanter   Hip ROM: 100% of normal   Hamstring tightness is none   Knee Effusion:  mild   Ecchymosis:  none   Knee ROM:  5 to 110 degrees with subpatellar crepitance.Pain with forced flexion.    Patella:  Patella does track normally.  Patellar apprehension test: negative  Patellar compression test: negative   Tenderness: Lateral joint line, medial joint line   Stability:  Lachman's test: negative  Posterior drawer: negative  Medial collateral ligament: negative  Lateral collateral ligament: guarded   Yoel Test:  negative   Griselda's Test:  present   Sensation:   intact to light touch   Pulses: normal DP and PT pulses.    Contralateral knee was without deficit.  Brisk cap  refill.      Imaging  Radiographs reviewed RT knee from previous visit. (01/09/24)  No acute fracture or dislocation detected.  No evidence of a knee joint effusion.  No significant osteoarthritic change.      Assessment:      RT knee pain and mild DJD. Concern for internal derangement.      Plan:      1. MRI RT knee to evaluate for internal derangement. She has completed 6 weeks of conservative treatment from the dates of 01/09/24 to 02/20/24 where she did the directed physician guided exercises 3 x wk x 6 wks. The regimen included  Heel cord stretch, standing quad stretch, supine hamstring stretch, half squats, hamstring curls, calf raises, leg extensions, straight leg raises, hip abduction, hip adduction and  leg presses. She continues with knee pain, antalgia, effusion, instability and decreased ROM post treatment.   2. No NSAIDS due to having Crohns. Tylenol and Biofreeze as previous.   3. Ice and limit stairs and squatting.   4. Follow up: post  MRI for review.   5. She had no further questions.

## 2024-02-24 DIAGNOSIS — I10 PRIMARY HYPERTENSION: ICD-10-CM

## 2024-02-27 ENCOUNTER — HOSPITAL ENCOUNTER (OUTPATIENT)
Dept: RADIOLOGY | Facility: HOSPITAL | Age: 44
Discharge: HOME | End: 2024-02-27
Attending: NURSE PRACTITIONER
Payer: MEDICAID

## 2024-02-27 DIAGNOSIS — M25.561 ACUTE PAIN OF RIGHT KNEE: ICD-10-CM

## 2024-02-27 DIAGNOSIS — M25.361 KNEE INSTABILITY, RIGHT: ICD-10-CM

## 2024-02-27 PROCEDURE — 73721 MRI JNT OF LWR EXTRE W/O DYE: CPT | Mod: TC,RT

## 2024-02-29 ENCOUNTER — OFFICE VISIT (OUTPATIENT)
Dept: ORTHOPEDICS | Facility: CLINIC | Age: 44
End: 2024-02-29
Payer: MEDICAID

## 2024-02-29 DIAGNOSIS — S83.281D ACUTE LATERAL MENISCAL TEAR, RIGHT, SUBSEQUENT ENCOUNTER: ICD-10-CM

## 2024-02-29 DIAGNOSIS — M25.361 KNEE INSTABILITY, RIGHT: ICD-10-CM

## 2024-02-29 DIAGNOSIS — M25.561 ACUTE PAIN OF RIGHT KNEE: Primary | ICD-10-CM

## 2024-02-29 PROCEDURE — 4010F ACE/ARB THERAPY RXD/TAKEN: CPT | Mod: CPTII,,, | Performed by: NURSE PRACTITIONER

## 2024-02-29 PROCEDURE — 99213 OFFICE O/P EST LOW 20 MIN: CPT | Mod: PBBFAC | Performed by: NURSE PRACTITIONER

## 2024-02-29 PROCEDURE — 99999 PR PBB SHADOW E&M-EST. PATIENT-LVL III: CPT | Mod: PBBFAC,,, | Performed by: NURSE PRACTITIONER

## 2024-02-29 PROCEDURE — 99213 OFFICE O/P EST LOW 20 MIN: CPT | Mod: S$PBB,,, | Performed by: NURSE PRACTITIONER

## 2024-02-29 PROCEDURE — 1160F RVW MEDS BY RX/DR IN RCRD: CPT | Mod: CPTII,,, | Performed by: NURSE PRACTITIONER

## 2024-02-29 PROCEDURE — 1159F MED LIST DOCD IN RCRD: CPT | Mod: CPTII,,, | Performed by: NURSE PRACTITIONER

## 2024-02-29 RX ORDER — HYDROCHLOROTHIAZIDE 12.5 MG/1
TABLET ORAL
Qty: 30 TABLET | Refills: 5 | Status: SHIPPED | OUTPATIENT
Start: 2024-02-29

## 2024-02-29 NOTE — PROGRESS NOTES
Subjective:      Follow up: RT Knee MRI:      Sadaf Dhaliwal is a 43 y.o. female returns for follow up for right knee pain. She presents for MRI review. She continues with knee pain, swelling and instability as previous. She is noted with a limp. She rates her pain as 7/10. The pain's location is lateral as previous. Symptoms worsen with activity, getting up from a chair, weight bearing and sitting for prolonged periods of time. The knee has given out or felt unstable at times. She denies any locking. The patient can not bend and straighten the knee due to pain. She is unable to take Nsaids and has used Tylenol, ice, physician directed exercises and compression with no relief.      Review of Systems  Constitutional: Negative.  Negative for fever.  Musculoskeletal: Positive for joint pain.  Skin: Negative.    Neurological: Negative.    Psychiatric/Behavioral: Anxiety, depression  All other systems reviewed and are negative.     Objective:      NVI  General :   alert, appears stated age and cooperative   Gait: Antalgic   Right Lower Extremity  Hip Palpation:  no tenderness over the greater  trochanter   Hip ROM: 100% of normal   Hamstring tightness is none   Knee Effusion:  mild   Ecchymosis:  none   Knee ROM:  5 to 110 degrees with subpatellar crepitance.Pain with forced flexion.    Patella:  Patella does track normally.  Patellar apprehension test: negative  Patellar compression test: negative   Tenderness: Lateral joint line, medial joint line   Stability:  Lachman's test: negative  Posterior drawer: negative  Medial collateral ligament: negative  Lateral collateral ligament: guarded   Yoel Test:  negative   Griselda's Test:  present   Sensation:   intact to light touch   Pulses: normal DP and PT pulses.    Contralateral knee was without deficit.  Brisk cap refill.      Imaging  Radiographs reviewed RT knee from previous visit. (01/09/24)  No acute fracture or dislocation detected.  No evidence of a knee  joint effusion.  No significant osteoarthritic change.      RT Knee MRI reviewed today by me:   Anterior and posterior cruciate ligaments intact.   Collateral ligaments intact.   Medial meniscus intact. Increased internal signal throughout the lateral meniscus without definite articular extension.   2.6 x 1.1 x 2.6 cm multilocular cystic structure adjacent to the lateral meniscus, suggestive of para meniscal cyst and suspicious for meniscal tear. Intact patellar and quadriceps tendons.   Small area of subchondral edema medial patellar facet.   Trace amount of joint fluid.     Assessment:      RT knee pain, lateral meniscal tear vs cyst and small effusion     Plan:      1. MRI RT knee reviewed, consistent with lateral meniscal tear with associated meniscal cyst. Referral to knee specialist to evaluate for knee arthroscopy.   2. No NSAIDS due to having Crohns. Tylenol and Biofreeze as previous.   3. Ice and limit stairs and squatting.   4. Follow up: prn.  5. She had no further questions.

## 2024-03-26 DIAGNOSIS — R09.81 NOSE CONGESTION: ICD-10-CM

## 2024-03-26 DIAGNOSIS — I10 PRIMARY HYPERTENSION: Primary | ICD-10-CM

## 2024-03-26 DIAGNOSIS — J30.89 ENVIRONMENTAL AND SEASONAL ALLERGIES: ICD-10-CM

## 2024-03-26 RX ORDER — LOSARTAN POTASSIUM 100 MG/1
TABLET ORAL
Qty: 30 TABLET | Refills: 2 | Status: SHIPPED | OUTPATIENT
Start: 2024-03-26

## 2024-03-26 RX ORDER — LEVOCETIRIZINE DIHYDROCHLORIDE 5 MG/1
TABLET, FILM COATED ORAL
Qty: 30 TABLET | Refills: 2 | Status: SHIPPED | OUTPATIENT
Start: 2024-03-26

## 2024-04-16 ENCOUNTER — OFFICE VISIT (OUTPATIENT)
Dept: PRIMARY CARE CLINIC | Facility: CLINIC | Age: 44
End: 2024-04-16
Payer: MEDICAID

## 2024-04-16 VITALS
BODY MASS INDEX: 40.84 KG/M2 | SYSTOLIC BLOOD PRESSURE: 104 MMHG | TEMPERATURE: 98 F | HEART RATE: 67 BPM | RESPIRATION RATE: 18 BRPM | DIASTOLIC BLOOD PRESSURE: 67 MMHG | HEIGHT: 60 IN | OXYGEN SATURATION: 97 % | WEIGHT: 208 LBS

## 2024-04-16 DIAGNOSIS — F90.0 ATTENTION DEFICIT HYPERACTIVITY DISORDER (ADHD), PREDOMINANTLY INATTENTIVE TYPE: Chronic | ICD-10-CM

## 2024-04-16 DIAGNOSIS — K50.114 CROHN'S DISEASE OF LARGE INTESTINE WITH ABSCESS: ICD-10-CM

## 2024-04-16 DIAGNOSIS — I10 PRIMARY HYPERTENSION: ICD-10-CM

## 2024-04-16 DIAGNOSIS — H66.91 CHRONIC EAR INFECTION, RIGHT: ICD-10-CM

## 2024-04-16 DIAGNOSIS — E03.9 HYPOTHYROIDISM, UNSPECIFIED TYPE: ICD-10-CM

## 2024-04-16 DIAGNOSIS — R73.03 PREDIABETES: ICD-10-CM

## 2024-04-16 DIAGNOSIS — J30.89 ENVIRONMENTAL AND SEASONAL ALLERGIES: ICD-10-CM

## 2024-04-16 DIAGNOSIS — E66.01 CLASS 3 SEVERE OBESITY WITH SERIOUS COMORBIDITY AND BODY MASS INDEX (BMI) OF 40.0 TO 44.9 IN ADULT, UNSPECIFIED OBESITY TYPE: ICD-10-CM

## 2024-04-16 DIAGNOSIS — F41.9 RECURRENT MILD MAJOR DEPRESSIVE DISORDER WITH ANXIETY: ICD-10-CM

## 2024-04-16 DIAGNOSIS — Z79.620 ADALIMUMAB (HUMIRA) LONG-TERM USE: ICD-10-CM

## 2024-04-16 DIAGNOSIS — F33.0 RECURRENT MILD MAJOR DEPRESSIVE DISORDER WITH ANXIETY: ICD-10-CM

## 2024-04-16 DIAGNOSIS — Z01.818 PRE-OP EVALUATION: Primary | ICD-10-CM

## 2024-04-16 DIAGNOSIS — E78.5 HYPERLIPIDEMIA, UNSPECIFIED HYPERLIPIDEMIA TYPE: ICD-10-CM

## 2024-04-16 PROCEDURE — 3074F SYST BP LT 130 MM HG: CPT | Mod: CPTII,,, | Performed by: STUDENT IN AN ORGANIZED HEALTH CARE EDUCATION/TRAINING PROGRAM

## 2024-04-16 PROCEDURE — 99215 OFFICE O/P EST HI 40 MIN: CPT | Mod: PBBFAC | Performed by: STUDENT IN AN ORGANIZED HEALTH CARE EDUCATION/TRAINING PROGRAM

## 2024-04-16 PROCEDURE — 99214 OFFICE O/P EST MOD 30 MIN: CPT | Mod: S$PBB,,, | Performed by: STUDENT IN AN ORGANIZED HEALTH CARE EDUCATION/TRAINING PROGRAM

## 2024-04-16 PROCEDURE — 3008F BODY MASS INDEX DOCD: CPT | Mod: CPTII,,, | Performed by: STUDENT IN AN ORGANIZED HEALTH CARE EDUCATION/TRAINING PROGRAM

## 2024-04-16 PROCEDURE — 99999 PR PBB SHADOW E&M-EST. PATIENT-LVL V: CPT | Mod: PBBFAC,,, | Performed by: STUDENT IN AN ORGANIZED HEALTH CARE EDUCATION/TRAINING PROGRAM

## 2024-04-16 PROCEDURE — 1160F RVW MEDS BY RX/DR IN RCRD: CPT | Mod: CPTII,,, | Performed by: STUDENT IN AN ORGANIZED HEALTH CARE EDUCATION/TRAINING PROGRAM

## 2024-04-16 PROCEDURE — 3078F DIAST BP <80 MM HG: CPT | Mod: CPTII,,, | Performed by: STUDENT IN AN ORGANIZED HEALTH CARE EDUCATION/TRAINING PROGRAM

## 2024-04-16 PROCEDURE — 4010F ACE/ARB THERAPY RXD/TAKEN: CPT | Mod: CPTII,,, | Performed by: STUDENT IN AN ORGANIZED HEALTH CARE EDUCATION/TRAINING PROGRAM

## 2024-04-16 PROCEDURE — 1159F MED LIST DOCD IN RCRD: CPT | Mod: CPTII,,, | Performed by: STUDENT IN AN ORGANIZED HEALTH CARE EDUCATION/TRAINING PROGRAM

## 2024-04-16 NOTE — PROGRESS NOTES
[facility] Note    HPI:  Sadaf Dhaliwal is a 43 y.o. female who presents today for Nasal Congestion (Pt here for surgery clearance rt knee 5-23-24. Also c/o nasal congestion)    43 year old with hypertension, hypothyroidism, chronic upper respiratory congestion with allergies, ADHD presents for preoperative physical for a scheduled right knee meniscectomy with lateral meniscal tear in MRI.   Patient denies anesthetic complications, has in chart distant report of post anesthesia nausea, vomiting.   Denies recent sick contacts, fever, chills, excessive headaches, vision changes, chest pain, palpitations, shortness of breath, abdominal pain, nausea, vomiting, constipation, diarrhea.     The following portions of the patient's history were reviewed and updated as appropriate: allergies, current medications, past family history, past medical history, past social history, past surgical history, and problem list.    ROS   A review of systems was performed and was negative except as noted above.    I personally reviewed allergies, past medical, surgical, social and family history and updated as appropriate.    Medications:    Current Outpatient Medications:     adalimumab (HUMIRA P[facility] START) 40 mg/0.8 mL PnKt, , Disp: , Rfl:     amitriptyline (ELAVIL) 100 MG tablet, Take 100 mg by mouth every evening., Disp: , Rfl:     amLODIPine (NORVASC) 5 MG tablet, TAKE ONE TABLET BY MOUTH ONCE DAILY (AT THE SAME TIME EVERYDAY) TO CONTROL BLOOD PRESSURE. THANK YOU!!!, Disp: , Rfl:     azaTHIOprine (IMURAN) 50 mg Tab, , Disp: , Rfl:     hydroCHLOROthiazide (HYDRODIURIL) 12.5 MG Tab, Take 1 tablet (12.5 mg total) by mouth once daily. **THANK YOU**, Disp: 30 tablet, Rfl: 5    levocetirizine (XYZAL) 5 MG tablet, TAKE ONE TABLET BY MOUTH EVERY EVENING **THANK YOU**, Disp: 30 tablet, Rfl: 2    levothyroxine (SYNTHROID) 112 MCG tablet, TAKE ONE TABLET BY MOUTH DAILY before breakfast **THANK YOU**, Disp: 30  tablet, Rfl: 2    losartan (COZAAR) 100 MG tablet, Take 1 tablet (100 mg total) by mouth once daily. **THANK YOU**, Disp: 30 tablet, Rfl: 2    methocarbamoL (ROBAXIN) 500 MG Tab, Take 1 tablet (500 mg total) by mouth 3 (three) times daily., Disp: 90 tablet, Rfl: 0    rosuvastatin (CRESTOR) 40 MG Tab, Take 1 tablet (40 mg total) by mouth once daily., Disp: 30 tablet, Rfl: 5    UBRELVY 100 mg tablet, Take by mouth., Disp: , Rfl:     valACYclovir (VALTREX) 500 MG tablet, Take 500 mg by mouth 2 (two) times daily., Disp: , Rfl:     venlafaxine 225 mg TR24, Take 1 tablet by mouth every morning., Disp: , Rfl:     ZEMBRACE SYMTOUCH 3 mg/0.5 mL PnIj, Inject into the skin., Disp: , Rfl:     amitriptyline (ELAVIL) 75 MG tablet, Take 75 mg by mouth every evening. (Patient not taking: Reported on 4/16/2024), Disp: , Rfl:     baclofen (LIORESAL) 10 MG tablet, Take 10 mg by mouth 2 (two) times daily. (Patient not taking: Reported on 4/16/2024), Disp: , Rfl:     EMGALITY  mg/mL PnIj, Inject into the skin. (Patient not taking: Reported on 4/16/2024), Disp: , Rfl:     estrogen, conjugated,-medroxyprogesterone 0.3-1.5 mg (PREMPRO) 0.3-1.5 mg per tablet, Take 1 tablet by mouth once daily. (Patient not taking: Reported on 4/16/2024), Disp: 30 tablet, Rfl: 3    guanFACINE 1 mg Tb24, Take 1 tablet by mouth. (Patient not taking: Reported on 4/16/2024), Disp: , Rfl:     propranoloL (INDERAL) 20 MG tablet, Take 1 tablet (20 mg total) by mouth once daily. (Patient not taking: Reported on 4/16/2024), Disp: 30 tablet, Rfl: 2     Health Maintenance:  Immunization History   Administered Date(s) Administered    Tdap 03/02/2023      Health Maintenance   Topic Date Due    Mammogram  03/14/2024    High Dose Statin  04/16/2025    TETANUS VACCINE  03/02/2033    Hepatitis C Screening  Completed    Lipid Panel  Completed     Health Maintenance Topics with due status: Not Due       Topic Last Completion Date    TETANUS VACCINE 03/02/2023     Cervical Cancer Screening 05/08/2023    Hemoglobin A1c (Prediabetes) 05/24/2023    High Dose Statin 04/16/2024     Health Maintenance Due   Topic Date Due    COVID-19 Vaccine (1) Never done    Pneumococcal Vaccines (Age 0-64) (1 of 2 - PCV) Never done    HIV Screening  Never done    Influenza Vaccine (1) 09/01/2023    Mammogram  03/14/2024       PHYSICAL EXAM:  Vitals:    04/16/24 1127   BP: 104/67   BP Location: Right forearm   Patient Position: Sitting   BP Method: Large (Automatic)   Pulse: 67   Resp: 18   Temp: 98 °F (36.7 °C)   TempSrc: Temporal   SpO2: 97%   Weight: 94.3 kg (208 lb)   Height: 5' (1.524 m)     Body mass index is 40.62 kg/m².  Physical Exam  Vitals reviewed.   Constitutional:       General: She is not in acute distress.     Appearance: Normal appearance. She is normal weight. She is not ill-appearing or toxic-appearing.   HENT:      Head: Normocephalic and atraumatic.      Right Ear: External ear normal.      Left Ear: External ear normal.      Nose: No congestion.      Mouth/Throat:      Pharynx: Oropharynx is clear.      Comments: Poor dentition, multiple caries, left molar chipped tooth, pulp exposed, gum swelling  Eyes:      Extraocular Movements: Extraocular movements intact.      Conjunctiva/sclera: Conjunctivae normal.   Cardiovascular:      Rate and Rhythm: Normal rate and regular rhythm.      Pulses: Normal pulses.      Heart sounds: Normal heart sounds.   Pulmonary:      Effort: Pulmonary effort is normal. No respiratory distress.      Breath sounds: No stridor. No wheezing, rhonchi or rales.   Abdominal:      General: Abdomen is flat. There is no distension.      Palpations: Abdomen is soft. There is no mass.      Tenderness: There is no abdominal tenderness. There is no right CVA tenderness, left CVA tenderness or guarding.   Musculoskeletal:         General: No swelling or tenderness. Normal range of motion.      Cervical back: Normal range of motion and neck supple. No rigidity or  tenderness.      Right lower leg: No edema.      Left lower leg: No edema.   Lymphadenopathy:      Cervical: No cervical adenopathy.   Skin:     General: Skin is warm and dry.      Capillary Refill: Capillary refill takes less than 2 seconds.      Coloration: Skin is not pale.      Findings: No bruising, erythema or rash.   Neurological:      General: No focal deficit present.      Mental Status: She is alert and oriented to person, place, and time.      Motor: No weakness.      Gait: Gait normal.   Psychiatric:         Mood and Affect: Mood normal.         Behavior: Behavior normal.         Thought Content: Thought content normal.         Judgment: Judgment normal.        MRI Knee Without Contrast Right  Narrative: EXAMINATION:  MRI KNEE WITHOUT CONTRAST RIGHT    CLINICAL HISTORY:  Knee pain, chronic, negative xray (Age >= 5y);  Pain in right knee    TECHNIQUE:  MRI of the right knee was performed in multiple planes and sequences.    FINDINGS:  Anterior and posterior cruciate ligaments intact.  Collateral ligaments intact.  Medial meniscus intact.  Increased internal signal throughout the lateral meniscus without definite articular extension.  2.6 x 1.1 x 2.6 cm multilocular cystic structure adjacent to the lateral meniscus, suggestive of para meniscal cyst and suspicious for meniscal tear.  Intact patellar and quadriceps tendons.  Small area of subchondral edema medial patellar facet.  Trace amount of joint fluid.  Impression: 1. 2.6 x 1.1 x 2.6 cm para meniscal cyst adjacent to the lateral meniscus and suspicious for meniscal tear.  2. Degenerative type a medial patellar marrow edema.    Electronically signed by: Rivera Small MD  Date:    02/27/2024  Time:    14:07   Predictors of intubation difficulty:   Morbid obesity? yes  BMI >40   Dentition: poor dentition with missing teeth    Imaging   CXR pending    Lab Review   No visits with results within 2 Month(s) from this visit.   Latest known visit with results  is:   Office Visit on 12/05/2023   Component Date Value    TSH 12/04/2023 5.110 (H)     Free T4 12/04/2023 0.84      Lab Results   Component Value Date     12/04/2023    K 3.7 12/04/2023     12/04/2023    CO2 29 12/04/2023    BUN 8 12/04/2023    CREATININE 0.9 12/04/2023    CALCIUM 8.8 12/04/2023     Lab Results   Component Value Date     12/04/2023     05/24/2023     02/16/2023    K 3.7 12/04/2023    K 3.4 (L) 05/24/2023    K 4.0 02/16/2023    CO2 29 12/04/2023    CO2 27 05/24/2023    CO2 29 02/16/2023    BUN 8 12/04/2023    BUN 9 05/24/2023    BUN 8 02/16/2023    CREATININE 0.9 12/04/2023    CREATININE 0.9 05/24/2023    CREATININE 0.9 02/16/2023    CALCIUM 8.8 12/04/2023    CALCIUM 9.2 05/24/2023    CALCIUM 9.1 02/16/2023     Lab Results   Component Value Date    WBC 6.20 05/24/2023    HGB 13.5 05/24/2023    HCT 39.5 05/24/2023    MCV 98 05/24/2023     05/24/2023     Lab Results   Component Value Date    CHOL 132 05/24/2023    TRIG 120 05/24/2023    HDL 40 05/24/2023      ASSESSMENT/PLAN:  1. Pre-op evaluation  Assessment & Plan:  43 year old with planned surgery as above. Normal physical exam with no changes to daily medications.   Overall medically stable with low to average risks undergoing planned procedure with orthopedic services.   Known risk factors for perioperative complications: chronic cigarette smoker, obesity.  Difficulty with intubation is not anticipated.  Current medications which may produce withdrawal symptoms if withheld perioperatively: Patient is not on antiplatelet, anticoagulants, beta blockers.    Continue with behavioral health meds, venlafaxine and amitriptyline.   1. Preoperative workup as follows chest x-ray, ECG, hemoglobin, hematocrit, electrolytes, creatinine, glucose, liver function studies, coagulation studies.  2. Change in medication regimen before surgery: none, continue medication regimen including morning of surgery, with sip of water.  3.  Prophylaxis for cardiac events with perioperative beta-blockers: not indicated.  4. Invasive hemodynamic monitoring perioperatively: at the discretion of anesthesiologist.  5. Deep vein thrombosis prophylaxis postoperatively:regimen to be chosen by surgical team.  6. Surveillance for postoperative MI with ECG immediately postoperatively and on postoperative days 1 and 2 AND troponin levels 24 hours postoperatively and on day 4 or hospital discharge (whichever comes first): at the discretion of anesthesiologist.  7. Other measures: Preoperative tobacco abstention x 60 days.  Postoperative incentive spirometry to prevent pneumonia.  Postoperative consultation with acute pain service.     Orders:  -     CBC Auto Differential; Future; Expected date: 04/16/2024  -     Comprehensive Metabolic Panel; Future; Expected date: 04/16/2024  -     Hemoglobin A1C; Future; Expected date: 04/16/2024  -     Ambulatory referral/consult to Gastroenterology; Future; Expected date: 04/23/2024  -     X-Ray Chest PA And Lateral; Future; Expected date: 04/19/2024  -     EKG 12-lead; Future  -     Protime-INR; Future; Expected date: 04/19/2024    2. Hypothyroidism, unspecified type  Assessment & Plan:  Lab Results   Component Value Date    TSH 5.110 (H) 12/04/2023    U2XNYAN 7.2 05/24/2023    FREET4 0.84 12/04/2023   Denies symptoms.   Continue with current levothyroxine 112 mcg daily.   - f/u TSH      Orders:  -     TSH; Future; Expected date: 04/16/2024  -     T4, Free; Future; Expected date: 04/16/2024    3. Class 3 severe obesity with serious comorbidity and body mass index (BMI) of 40.0 to 44.9 in adult, unspecified obesity type  Overview:  Wt Readings from Last 8 Encounters:   04/16/24 94.3 kg (208 lb)   03/27/24 95.2 kg (209 lb 14.1 oz)   02/05/24 92.5 kg (204 lb)   12/12/23 91.6 kg (202 lb)   12/05/23 93.9 kg (207 lb)   05/08/23 90.7 kg (200 lb)   04/26/23 89.8 kg (198 lb)   03/30/23 87.5 kg (193 lb)       Assessment &  Plan:  Recommendations:   Stay physically active. As tolerated alternate resistance training with stretching and cardio. Goal of 150 minutes per week of moderate intensity activity or 7,500 - 10,000 steps per day. Follow the Mediterranean Diet. Include whole fresh fruits, vegetables, olive oil, seeds, nuts, whole grains, cold water fish, salmon, mackerel and lean cuts of meat.  Do not drink sugary/diet carbonated beverages. Decrease portion sizes slightly which will result in an approximately 500-calorie deficit. Avoid fast or fried and processed food, especially canned foods. Avoid refined carbohydrates, white starchy foods, flour, white potato, bread, muffins, and cakes. Consider substituting one meal a day with a meal replacement such as Slim fast, lean cuisine, or weight watcher's. Follow a healthy diet that includes enough calcium, vitamin D and proteins for bone health.        4. Crohn's disease of large intestine with abscess  Assessment & Plan:  Per patient stable, denies new symptoms at this time. Continue Humira and azathioprine.  - f/u GI specialist visit         Orders:  -     Ambulatory referral/consult to Gastroenterology; Future; Expected date: 04/23/2024    5. Adalimumab (Humira) long-term use  -     Ambulatory referral/consult to Gastroenterology; Future; Expected date: 04/23/2024    6. Environmental and seasonal allergies  Assessment & Plan:  Patient with sensitivity to environmental allergen.   - nasal mucosal hyperemic  - continue zyxal   - ENT follow up coming up on 5/10/24          7. Attention deficit hyperactivity disorder (ADHD), predominantly inattentive type  Assessment & Plan:  Recently started on guanfacine 1 mg daily. Denies adverse symptoms at this time. Continue and follow up with  specialist.       8. Recurrent mild major depressive disorder with anxiety  Overview:  In the past took  - Deysrel, Paxil, and Buspar  Now taking daily,   - Amitriptyline 75 mg  - Venlafaxine 225 mg         Assessment & Plan:  Patient has persistent depression which is moderate and is currently controlled. Will  adjust  anti-depressant medications. Patient does not display psychosis at this time. Continue to monitor closely and adjust plan of care as needed.  - continue venlafaxine 150 mg daily  - continue amitriptyline 75 mg daily  - practice good sleep hygiene              9. Chronic ear infection, right  Assessment & Plan:  Chronic congestion and frequent ear infection. Denies tinnitus at this time, hearing difficulties.  On exam no evidence of erythema, to bulging of TM, but small fluid line.   - declines use of flonase secondary to over drying and increased pain  - continue xyzal PRN  - follow up referral ENT for examination and work up  - appointment scheduled for 5/10/24        10. Hyperlipidemia, unspecified hyperlipidemia type  Assessment & Plan:  Tolerating current medications. Continue pravastatin 40 mg daily  Lab Results   Component Value Date    CHOL 132 05/24/2023    CHOL 284 (H) 02/16/2023     Lab Results   Component Value Date    HDL 40 05/24/2023    HDL 36 (L) 02/16/2023     Lab Results   Component Value Date    LDLCALC 68.0 05/24/2023    LDLCALC 208.6 (H) 02/16/2023     Lab Results   Component Value Date    TRIG 120 05/24/2023    TRIG 197 (H) 02/16/2023       Lab Results   Component Value Date    CHOLHDL 30.3 05/24/2023    CHOLHDL 12.7 (L) 02/16/2023            11. Primary hypertension  Overview:  - losartan 100 mg daily  - HCTZ 12.5 mg daily  - DISCONTINUE per patient, propranolol 20 mg daily  - low sodium diet <2 g or 2 teaspoons daily  - increase daily whole foods, vegetables, fruits, meats in diet    Assessment & Plan:  BP Readings from Last 3 Encounters:   04/16/24 104/67   03/27/24 128/86   02/05/24 100/60   Normotensive. Denies symptoms.   Keep pill box to keep track of daily/weekly medications to take.   - continue with above daily antihypertensive meds  - losartan 100 mg daily  -  hydrochlorothiazide 12.5 mg daily        12. Prediabetes  Assessment & Plan:  Lab Results   Component Value Date    HGBA1C 6.2 (H) 05/24/2023   - f/u A1C            Other than changes above, continue current medications and maintain follow up with specialists.      No follow-ups on file.   No results found for this or any previous visit (from the past 2016 hour(s)).      Kazumi G Yoshinaga, DO [facility]

## 2024-04-19 PROBLEM — Z01.818 PRE-OP EVALUATION: Status: ACTIVE | Noted: 2024-04-16

## 2024-04-19 NOTE — ASSESSMENT & PLAN NOTE
Lab Results   Component Value Date    TSH 5.110 (H) 12/04/2023    [id number] 7.2 05/24/2023    FREET4 0.84 12/04/2023   Denies symptoms.   Continue with current levothyroxine 112 mcg daily.   - f/u TSH

## 2024-04-19 NOTE — ASSESSMENT & PLAN NOTE
BP Readings from Last 3 Encounters:   04/16/24 104/67   03/27/24 128/86   02/05/24 100/60   Normotensive. Denies symptoms.   Keep pill box to keep track of daily/weekly medications to take.   - continue with above daily antihypertensive meds  - losartan 100 mg daily  - hydrochlorothiazide 12.5 mg daily

## 2024-04-19 NOTE — ASSESSMENT & PLAN NOTE
Patient has persistent depression which is moderate and is currently controlled. Will  adjust  anti-depressant medications. Patient does not display psychosis at this time. Continue to monitor closely and adjust plan of care as needed.  - continue venlafaxine 150 mg daily  - continue amitriptyline 75 mg daily  - practice good sleep hygiene

## 2024-04-19 NOTE — ASSESSMENT & PLAN NOTE
Patient with sensitivity to environmental allergen.   - nasal mucosal hyperemic  - continue zyxal   - ENT follow up coming up on 5/10/24

## 2024-04-19 NOTE — ASSESSMENT & PLAN NOTE
43 year old with planned surgery as above. Normal physical exam with no changes to daily medications.   Overall medically stable with low to average risks undergoing planned procedure with orthopedic services.   Known risk factors for perioperative complications: chronic cigarette smoker, obesity.  Difficulty with intubation is not anticipated.  Current medications which may produce withdrawal symptoms if withheld perioperatively: Patient is not on antiplatelet, anticoagulants, beta blockers.    Continue with behavioral health meds, venlafaxine and amitriptyline.   1. Preoperative workup as follows chest x-ray, ECG, hemoglobin, hematocrit, electrolytes, creatinine, glucose, liver function studies, coagulation studies.  2. Change in medication regimen before surgery: none, continue medication regimen including morning of surgery, with sip of water.  3. Prophylaxis for cardiac events with perioperative beta-blockers: not indicated.  4. Invasive hemodynamic monitoring perioperatively: at the discretion of anesthesiologist.  5. Deep vein thrombosis prophylaxis postoperatively:regimen to be chosen by surgical team.  6. Surveillance for postoperative MI with ECG immediately postoperatively and on postoperative days 1 and 2 AND troponin levels 24 hours postoperatively and on day 4 or hospital discharge (whichever comes first): at the discretion of anesthesiologist.  7. Other measures: Preoperative tobacco abstention x 60 days.  Postoperative incentive spirometry to prevent pneumonia.  Postoperative consultation with acute pain service.

## 2024-04-19 NOTE — ASSESSMENT & PLAN NOTE
Chronic congestion and frequent ear infection. Denies tinnitus at this time, hearing difficulties.  On exam no evidence of erythema, to bulging of TM, but small fluid line.   - declines use of flonase secondary to over drying and increased pain  - continue xyzal PRN  - follow up referral ENT for examination and work up  - appointment scheduled for 5/10/24

## 2024-04-19 NOTE — ASSESSMENT & PLAN NOTE
Per patient stable, denies new symptoms at this time. Continue Humira and azathioprine.  - f/u GI specialist visit

## 2024-04-25 ENCOUNTER — HOSPITAL ENCOUNTER (OUTPATIENT)
Dept: RADIOLOGY | Facility: HOSPITAL | Age: 44
Discharge: HOME | End: 2024-04-25
Attending: STUDENT IN AN ORGANIZED HEALTH CARE EDUCATION/TRAINING PROGRAM
Payer: MEDICAID

## 2024-04-25 ENCOUNTER — HOSPITAL ENCOUNTER (OUTPATIENT)
Dept: PULMONOLOGY | Facility: HOSPITAL | Age: 44
Discharge: HOME | End: 2024-04-25
Attending: STUDENT IN AN ORGANIZED HEALTH CARE EDUCATION/TRAINING PROGRAM
Payer: MEDICAID

## 2024-04-25 DIAGNOSIS — Z01.818 PRE-OP EVALUATION: ICD-10-CM

## 2024-04-25 DIAGNOSIS — E03.9 HYPOTHYROIDISM, UNSPECIFIED TYPE: Primary | ICD-10-CM

## 2024-04-25 PROCEDURE — 93005 ELECTROCARDIOGRAM TRACING: CPT

## 2024-04-25 PROCEDURE — 93010 ELECTROCARDIOGRAM REPORT: CPT | Mod: ,,, | Performed by: INTERNAL MEDICINE

## 2024-04-25 PROCEDURE — 71046 X-RAY EXAM CHEST 2 VIEWS: CPT | Mod: TC

## 2024-04-25 RX ORDER — LEVOTHYROXINE SODIUM 112 UG/1
112 TABLET ORAL
Qty: 30 TABLET | Refills: 11 | Status: SHIPPED | OUTPATIENT
Start: 2024-04-25

## 2024-04-26 LAB
OHS QRS DURATION: 80 MS
OHS QTC CALCULATION: 452 MS

## 2024-05-02 ENCOUNTER — OFFICE VISIT (OUTPATIENT)
Dept: PRIMARY CARE CLINIC | Facility: CLINIC | Age: 44
End: 2024-05-02
Payer: MEDICAID

## 2024-05-02 VITALS
HEIGHT: 60 IN | DIASTOLIC BLOOD PRESSURE: 62 MMHG | SYSTOLIC BLOOD PRESSURE: 94 MMHG | BODY MASS INDEX: 40.84 KG/M2 | WEIGHT: 208 LBS | OXYGEN SATURATION: 95 % | HEART RATE: 90 BPM | RESPIRATION RATE: 16 BRPM | TEMPERATURE: 98 F

## 2024-05-02 DIAGNOSIS — R79.89 ELEVATED LFTS: ICD-10-CM

## 2024-05-02 DIAGNOSIS — F33.0 RECURRENT MILD MAJOR DEPRESSIVE DISORDER WITH ANXIETY: ICD-10-CM

## 2024-05-02 DIAGNOSIS — K50.90 EXACERBATION OF CROHN'S DISEASE WITHOUT COMPLICATION: ICD-10-CM

## 2024-05-02 DIAGNOSIS — K50.119 CROHN'S DISEASE OF LARGE INTESTINE WITH COMPLICATION: ICD-10-CM

## 2024-05-02 DIAGNOSIS — E87.6 HYPOKALEMIA: ICD-10-CM

## 2024-05-02 DIAGNOSIS — F17.218 CIGARETTE NICOTINE DEPENDENCE WITH OTHER NICOTINE-INDUCED DISORDER: Chronic | ICD-10-CM

## 2024-05-02 DIAGNOSIS — I10 PRIMARY HYPERTENSION: ICD-10-CM

## 2024-05-02 DIAGNOSIS — F90.0 ATTENTION DEFICIT HYPERACTIVITY DISORDER (ADHD), PREDOMINANTLY INATTENTIVE TYPE: Chronic | ICD-10-CM

## 2024-05-02 DIAGNOSIS — E03.9 HYPOTHYROIDISM, UNSPECIFIED TYPE: ICD-10-CM

## 2024-05-02 DIAGNOSIS — F41.9 RECURRENT MILD MAJOR DEPRESSIVE DISORDER WITH ANXIETY: ICD-10-CM

## 2024-05-02 DIAGNOSIS — N17.9 AKI (ACUTE KIDNEY INJURY): ICD-10-CM

## 2024-05-02 DIAGNOSIS — E66.01 CLASS 3 SEVERE OBESITY WITH SERIOUS COMORBIDITY AND BODY MASS INDEX (BMI) OF 40.0 TO 44.9 IN ADULT, UNSPECIFIED OBESITY TYPE: ICD-10-CM

## 2024-05-02 DIAGNOSIS — E11.9 NEW ONSET TYPE 2 DIABETES MELLITUS: Primary | ICD-10-CM

## 2024-05-02 DIAGNOSIS — H66.91 CHRONIC EAR INFECTION, RIGHT: ICD-10-CM

## 2024-05-02 PROCEDURE — 3074F SYST BP LT 130 MM HG: CPT | Mod: CPTII,,, | Performed by: STUDENT IN AN ORGANIZED HEALTH CARE EDUCATION/TRAINING PROGRAM

## 2024-05-02 PROCEDURE — 1160F RVW MEDS BY RX/DR IN RCRD: CPT | Mod: CPTII,,, | Performed by: STUDENT IN AN ORGANIZED HEALTH CARE EDUCATION/TRAINING PROGRAM

## 2024-05-02 PROCEDURE — 3008F BODY MASS INDEX DOCD: CPT | Mod: CPTII,,, | Performed by: STUDENT IN AN ORGANIZED HEALTH CARE EDUCATION/TRAINING PROGRAM

## 2024-05-02 PROCEDURE — 99214 OFFICE O/P EST MOD 30 MIN: CPT | Mod: S$PBB,,, | Performed by: STUDENT IN AN ORGANIZED HEALTH CARE EDUCATION/TRAINING PROGRAM

## 2024-05-02 PROCEDURE — 4010F ACE/ARB THERAPY RXD/TAKEN: CPT | Mod: CPTII,,, | Performed by: STUDENT IN AN ORGANIZED HEALTH CARE EDUCATION/TRAINING PROGRAM

## 2024-05-02 PROCEDURE — 1159F MED LIST DOCD IN RCRD: CPT | Mod: CPTII,,, | Performed by: STUDENT IN AN ORGANIZED HEALTH CARE EDUCATION/TRAINING PROGRAM

## 2024-05-02 PROCEDURE — 99999 PR PBB SHADOW E&M-EST. PATIENT-LVL V: CPT | Mod: PBBFAC,,, | Performed by: STUDENT IN AN ORGANIZED HEALTH CARE EDUCATION/TRAINING PROGRAM

## 2024-05-02 PROCEDURE — 99215 OFFICE O/P EST HI 40 MIN: CPT | Mod: PBBFAC | Performed by: STUDENT IN AN ORGANIZED HEALTH CARE EDUCATION/TRAINING PROGRAM

## 2024-05-02 PROCEDURE — 3078F DIAST BP <80 MM HG: CPT | Mod: CPTII,,, | Performed by: STUDENT IN AN ORGANIZED HEALTH CARE EDUCATION/TRAINING PROGRAM

## 2024-05-02 PROCEDURE — 3051F HG A1C>EQUAL 7.0%<8.0%: CPT | Mod: CPTII,,, | Performed by: STUDENT IN AN ORGANIZED HEALTH CARE EDUCATION/TRAINING PROGRAM

## 2024-05-02 RX ORDER — METFORMIN HYDROCHLORIDE 500 MG/1
500 TABLET, EXTENDED RELEASE ORAL 2 TIMES DAILY WITH MEALS
Qty: 60 TABLET | Refills: 2 | Status: SHIPPED | OUTPATIENT
Start: 2024-05-02

## 2024-05-02 RX ORDER — GUANFACINE 1 MG/1
1 TABLET, EXTENDED RELEASE ORAL
COMMUNITY
Start: 2024-04-25 | End: 2024-05-16

## 2024-05-02 NOTE — ASSESSMENT & PLAN NOTE
Per patient stable, denies new symptoms at this time. Continue Humira and azathioprine.  - f/u GI specialist visit, no recent visit, patient will be calling for follow up

## 2024-05-02 NOTE — PROGRESS NOTES
[facility] Note    HPI:  Sadaf Dhaliwal is a 43 y.o. female who presents today for lab review (Patient here to get lab results)    43 year old with hypertension, hypothyroidism, chronic upper respiratory congestion with allergies, Chron's disease, ADHD here for follow up lab review.   After reviewing latest lab results, patient has cut out her granulated sugar use in her coffee and using sugar substitute. She has also stopped drinking coke and switched to coke zero.   Her partner at home has diabetes and she endorses she will be learning to modify her diet.   She states diarrhea intermittently occurring secondary to Chron's, certain food items causes her to have constipation, but is always followed by diarrhea.   Patient also notes recent frequent dizziness, no passing out, often resolves after she sits down and rests.   Denies recent sick contacts, fever, chills, excessive headaches, vision changes, chest pain, palpitations, shortness of breath, abdominal pain, nausea, vomiting, constipation, diarrhea.      The following portions of the patient's history were reviewed and updated as appropriate: allergies, current medications, past family history, past medical history, past social history, past surgical history, and problem list.     ROS   A review of systems was performed and was negative except as noted above.    I personally reviewed allergies, past medical, surgical, social and family history and updated as appropriate.    Medications:    Current Outpatient Medications:     adalimumab (HUMIRA PEN CROHNS-[facility]-HS START) 40 mg/0.8 mL PnKt, , Disp: , Rfl:     amitriptyline (ELAVIL) 100 MG tablet, Take 100 mg by mouth every evening., Disp: , Rfl:     azaTHIOprine (IMURAN) 50 mg Tab, , Disp: , Rfl:     guanFACINE 1 mg Tb24, Take 1 tablet by mouth., Disp: , Rfl:     hydroCHLOROthiazide (HYDRODIURIL) 12.5 MG Tab, Take 1 tablet (12.5 mg total) by mouth once daily. **THANK YOU**, Disp: 30 tablet,  Rfl: 5    levocetirizine (XYZAL) 5 MG tablet, TAKE ONE TABLET BY MOUTH EVERY EVENING **THANK YOU**, Disp: 30 tablet, Rfl: 2    levothyroxine (SYNTHROID) 112 MCG tablet, Take 1 tablet (112 mcg total) by mouth before breakfast., Disp: 30 tablet, Rfl: 11    losartan (COZAAR) 100 MG tablet, Take 1 tablet (100 mg total) by mouth once daily. **THANK YOU**, Disp: 30 tablet, Rfl: 2    rosuvastatin (CRESTOR) 40 MG Tab, Take 1 tablet (40 mg total) by mouth once daily., Disp: 30 tablet, Rfl: 5    UBRELVY 100 mg tablet, Take by mouth., Disp: , Rfl:     valACYclovir (VALTREX) 500 MG tablet, Take 500 mg by mouth 2 (two) times daily., Disp: , Rfl:     metFORMIN (GLUCOPHAGE-XR) 500 MG ER 24hr tablet, Take 1 tablet (500 mg total) by mouth 2 (two) times daily with meals., Disp: 60 tablet, Rfl: 2     Health Maintenance:  Immunization History   Administered Date(s) Administered    Influenza 01/01/2009    Tdap 07/01/2013, 03/02/2023      Health Maintenance   Topic Date Due    Mammogram  03/14/2024    High Dose Statin  05/02/2025    TETANUS VACCINE  03/02/2033    Hepatitis C Screening  Completed    Lipid Panel  Completed     Health Maintenance Topics with due status: Not Due       Topic Last Completion Date    Influenza Vaccine 01/01/2009    TETANUS VACCINE 03/02/2023    Cervical Cancer Screening 05/08/2023    Hemoglobin A1c (Prediabetes) 04/25/2024    High Dose Statin 05/02/2024     Health Maintenance Due   Topic Date Due    COVID-19 Vaccine (1) Never done    Pneumococcal Vaccines (Age 0-64) (1 of 2 - PCV) Never done    HIV Screening  Never done    Mammogram  03/14/2024       PHYSICAL EXAM:  Vitals:    05/02/24 1440   BP: 94/62   BP Location: Left arm   Patient Position: Sitting   BP Method: Large (Automatic)   Pulse: 90   Resp: 16   Temp: 97.9 °F (36.6 °C)   SpO2: 95%   Weight: 94.3 kg (208 lb)   Height: 5' (1.524 m)     Body mass index is 40.62 kg/m².  Physical Exam  Vitals reviewed.   Constitutional:       General: She is not in  acute distress.     Appearance: Normal appearance. She is normal weight. She is not ill-appearing or toxic-appearing.   HENT:      Head: Normocephalic and atraumatic.      Right Ear: External ear normal.      Left Ear: External ear normal.      Nose: No congestion.      Mouth/Throat:      Pharynx: Oropharynx is clear.      Comments: Poor dentition, multiple caries, left molar chipped tooth, pulp exposed, gum swelling  Eyes:      Extraocular Movements: Extraocular movements intact.      Conjunctiva/sclera: Conjunctivae normal.   Cardiovascular:      Rate and Rhythm: Normal rate and regular rhythm.      Pulses: Normal pulses.      Heart sounds: Normal heart sounds.   Pulmonary:      Effort: Pulmonary effort is normal. No respiratory distress.      Breath sounds: No stridor. No wheezing, rhonchi or rales.   Abdominal:      General: Abdomen is flat. There is no distension.      Palpations: Abdomen is soft. There is no mass.      Tenderness: There is no abdominal tenderness. There is no right CVA tenderness, left CVA tenderness or guarding.   Musculoskeletal:         General: No swelling or tenderness. Normal range of motion.      Cervical back: Normal range of motion and neck supple. No rigidity or tenderness.      Right lower leg: No edema.      Left lower leg: No edema.   Lymphadenopathy:      Cervical: No cervical adenopathy.   Skin:     General: Skin is warm and dry.      Capillary Refill: Capillary refill takes less than 2 seconds.      Coloration: Skin is not pale.      Findings: No bruising, erythema or rash.   Neurological:      General: No focal deficit present.      Mental Status: She is alert and oriented to person, place, and time.      Motor: No weakness.      Gait: Gait normal.   Psychiatric:         Mood and Affect: Mood normal.         Behavior: Behavior normal.         Thought Content: Thought content normal.         Judgment: Judgment normal.          ASSESSMENT/PLAN:  1. New onset type 2 diabetes  "mellitus  Assessment & Plan:  Diagnosed in 4/16/24  Current diabetic medications:  - dietary management  Known diabetic complications: none    Weight trend:   BMI Readings from Last 2 Encounters:   05/02/24 40.62 kg/m²   04/16/24 40.62 kg/m²     Any episodes of hypoglycemia? no    Diabetes Management Status  Statin: Taking  ACE/ARB: Taking    Screening or Prevention Patient's value   HgA1C Testing and Control   Lab Results   Component Value Date    HGBA1C 7.3 (H) 04/25/2024        Lipid profile : 05/24/2023   LDL control Lab Results   Component Value Date    LDLCALC 68.0 05/24/2023      Nephropathy screening No results found for: "MICALBCREAT"   Blood pressure BP Readings from Last 1 Encounters:   05/02/24 94/62      Dilated retinal exam Most Recent Eye Exam Date: Not Found   Foot exam Most Recent Foot Exam Date: Not Found        Orders:  -     metFORMIN (GLUCOPHAGE-XR) 500 MG ER 24hr tablet; Take 1 tablet (500 mg total) by mouth 2 (two) times daily with meals.  Dispense: 60 tablet; Refill: 2  -     Hemoglobin A1C; Future; Expected date: 08/02/2024  -     Comprehensive Metabolic Panel; Future; Expected date: 08/02/2024  -     Ambulatory referral/consult to Ophthalmology; Future; Expected date: 05/09/2024    2. Attention deficit hyperactivity disorder (ADHD), predominantly inattentive type  Assessment & Plan:  Recently started on guanfacine 1 mg daily. Denies adverse symptoms at this time. Continue and follow up with  specialist.       3. Primary hypertension  Overview:  - losartan 100 mg daily  - HCTZ 12.5 mg daily  - DISCONTINUE per patient, propranolol 20 mg daily  - low sodium diet <2 g or 2 teaspoons daily  - increase daily whole foods, vegetables, fruits, meats in diet    Assessment & Plan:  BP Readings from Last 3 Encounters:   05/02/24 94/62   04/16/24 104/67   03/27/24 128/86   Since start of guanfacine patient's SBP readings have become low, today below <100 mmHg.   Keep pill box to keep track of " daily/weekly medications to take.   - continue with above daily antihypertensive meds  - losartan 100 mg daily  - hydrochlorothiazide 12.5 mg daily  - amlodipine 5 mg daily        4. JOE (acute kidney injury)  Assessment & Plan:  Lab Results   Component Value Date    K 3.6 04/25/2024    K 3.7 12/04/2023    K 3.4 (L) 05/24/2023      Lab Results   Component Value Date    CREATININE 1.0 04/25/2024    BUN 8 04/25/2024     04/25/2024    K 3.6 04/25/2024     04/25/2024    CO2 27 04/25/2024   Renal function within normal limits. Continue with daily dietary intake of food items with potassium.               5. Hypokalemia    6. Hypothyroidism, unspecified type  Assessment & Plan:  Lab Results   Component Value Date    TSH 2.260 04/25/2024    [id number] 7.2 05/24/2023    FREET4 1.09 04/25/2024   Denies symptoms.   Continue with current levothyroxine 112 mcg daily.   Follow up 6 months        7. Recurrent mild major depressive disorder with anxiety  Overview:  In the past took  - Deysrel, Paxil, and Buspar  Now taking daily,   - Amitriptyline 75 mg  - Venlafaxine 225 mg        Assessment & Plan:  Patient has recurrent depression which is mild and is currently controlled. Will Continue anti-depressant medications.  Patient does not display psychosis at this time. Continue to monitor closely and adjust plan of care as needed.  Continue with current daily medications, follow up with  specialist in June 2024.         8. Chronic ear infection, right  Assessment & Plan:          9. Crohn's disease of large intestine with complication  Assessment & Plan:  Per patient stable, denies new symptoms at this time. Continue Humira and azathioprine.  - f/u GI specialist visit, no recent visit, patient will be calling for follow up         Orders:  -     Ambulatory referral/consult to Ophthalmology; Future; Expected date: 05/09/2024    10. Class 3 severe obesity with serious comorbidity and body mass index (BMI) of 40.0 to 44.9 in  adult, unspecified obesity type  Overview:  Wt Readings from Last 8 Encounters:   04/16/24 94.3 kg (208 lb)   03/27/24 95.2 kg (209 lb 14.1 oz)   02/05/24 92.5 kg (204 lb)   12/12/23 91.6 kg (202 lb)   12/05/23 93.9 kg (207 lb)   05/08/23 90.7 kg (200 lb)   04/26/23 89.8 kg (198 lb)   03/30/23 87.5 kg (193 lb)       Assessment & Plan:  Body mass index is 40.62 kg/m². Morbid obesity complicates all aspects of disease management from diagnostic modalities to treatment. Weight loss encouraged and health benefits explained to patient.     Given chronic inflammatory disease. Counseled on following a lactose and gluten free diet. Reduce intake of highly refined carbohydrates.     Recommendations:   Stay physically active. As tolerated alternate resistance training with stretching and cardio. Goal of 150 minutes per week of moderate intensity activity or 7,500 - 10,000 steps per day. Follow the Mediterranean Diet. Include whole fresh fruits, vegetables, olive oil, seeds, nuts, whole grains, cold water fish, salmon, mackerel and lean cuts of meat.  Do not drink sugary/diet carbonated beverages. Decrease portion sizes slightly which will result in an approximately 500-calorie deficit. Avoid fast or fried and processed food, especially canned foods. Avoid refined carbohydrates, white starchy foods, flour, white potato, bread, muffins, and cakes. Consider substituting one meal a day with a meal replacement such as Slim fast, lean cuisine, or weight watcher's. Follow a healthy diet that includes enough calcium, vitamin D and proteins for bone health.          11. Elevated LFTs  Assessment & Plan:  4/25/24 Within normal limits (high normal). Continue to monitor.         12. Exacerbation of Crohn's disease without complication  Assessment & Plan:  See Crohn's Disease        13. Cigarette nicotine dependence with other nicotine-induced disorder  Assessment & Plan:  Dangers of cigarette smoking were reviewed with patient in detail.  Patient was Counseled for 3-10 minutes. Nicotine replacement options were discussed. Nicotine replacement was discussed- not prescribed per patient's request            Other than changes above, continue current medications and maintain follow up with specialists.      Follow up in about 3 months (around 8/2/2024) for Lab review, Med recheck.   Recent Results (from the past 2016 hour(s))   EKG 12-lead    Collection Time: 04/25/24  3:08 PM   Result Value Ref Range    QRS Duration 80 ms    [facility] QTC Calculation 452 ms   CBC Auto Differential    Collection Time: 04/25/24  3:21 PM   Result Value Ref Range    WBC 8.37 3.90 - 12.70 K/uL    RBC 4.26 4.00 - 5.40 M/uL    Hemoglobin 14.4 12.0 - 16.0 g/dL    Hematocrit 41.3 37.0 - 48.5 %    MCV 97 82 - 98 fL    MCH 33.8 (H) 27.0 - 31.0 pg    MCHC 34.9 32.0 - 36.0 g/dL    RDW 13.3 11.5 - 14.5 %    Platelets 231 150 - 450 K/uL    MPV 8.8 (L) 9.2 - 12.9 fL    Immature Granulocytes 0.1 0.0 - 0.5 %    Gran # (ANC) 5.9 1.8 - 7.7 K/uL    Immature Grans (Abs) 0.01 0.00 - 0.04 K/uL    Lymph # 2.0 1.0 - 4.8 K/uL    Mono # 0.5 0.3 - 1.0 K/uL    Eos # 0.1 0.0 - 0.5 K/uL    Baso # 0.05 0.00 - 0.20 K/uL    nRBC 0 0 /100 WBC    Gran % 68.3 38.0 - 73.0 %    Lymph % 23.2 18.0 - 48.0 %    Mono % 6.2 4.0 - 15.0 %    Eosinophil % 1.6 0.0 - 8.0 %    Basophil % 0.6 0.0 - 1.9 %    Differential Method Automated    Comprehensive Metabolic Panel    Collection Time: 04/25/24  3:21 PM   Result Value Ref Range    Sodium 139 136 - 145 mmol/L    Potassium 3.6 3.5 - 5.1 mmol/L    Chloride 105 95 - 110 mmol/L    CO2 27 23 - 29 mmol/L    Glucose 198 (H) 70 - 110 mg/dL    BUN 8 6 - 20 mg/dL    Creatinine 1.0 0.5 - 1.4 mg/dL    Calcium 9.6 8.7 - 10.5 mg/dL    Total Protein 8.0 6.0 - 8.4 g/dL    Albumin 3.7 3.5 - 5.2 g/dL    Total Bilirubin 0.3 0.1 - 1.0 mg/dL    Alkaline Phosphatase 112 55 - 135 U/L    AST 31 10 - 40 U/L    ALT 36 10 - 44 U/L    eGFR >60.0 >60 mL/min/1.73 m^2    Anion Gap 7 3 - 11 mmol/L    Hemoglobin A1C    Collection Time: 04/25/24  3:21 PM   Result Value Ref Range    Hemoglobin A1C 7.3 (H) 4.0 - 5.6 %    Estimated Avg Glucose 163 (H) 68 - 131 mg/dL   TSH    Collection Time: 04/25/24  3:21 PM   Result Value Ref Range    TSH 2.260 0.400 - 4.000 uIU/mL   T4, Free    Collection Time: 04/25/24  3:21 PM   Result Value Ref Range    Free T4 1.09 0.71 - 1.51 ng/dL   Protime-INR    Collection Time: 04/25/24  3:21 PM   Result Value Ref Range    Prothrombin Time 10.6 9.0 - 12.5 sec    INR 0.9 0.8 - 1.2       Good Conway DO  [facility]

## 2024-05-02 NOTE — ASSESSMENT & PLAN NOTE
BP Readings from Last 3 Encounters:   05/02/24 94/62   04/16/24 104/67   03/27/24 128/86   Since start of guanfacine patient's SBP readings have become low, today below <100 mmHg.   Keep pill box to keep track of daily/weekly medications to take.   - continue with above daily antihypertensive meds  - losartan 100 mg daily  - hydrochlorothiazide 12.5 mg daily  - amlodipine 5 mg daily

## 2024-05-02 NOTE — ASSESSMENT & PLAN NOTE
Dangers of cigarette smoking were reviewed with patient in detail. Patient was Counseled for 3-10 minutes. Nicotine replacement options were discussed. Nicotine replacement was discussed- not prescribed per patient's request

## 2024-05-02 NOTE — ASSESSMENT & PLAN NOTE
Lab Results   Component Value Date    K 3.6 04/25/2024    K 3.7 12/04/2023    K 3.4 (L) 05/24/2023      Lab Results   Component Value Date    CREATININE 1.0 04/25/2024    BUN 8 04/25/2024     04/25/2024    K 3.6 04/25/2024     04/25/2024    CO2 27 04/25/2024   Renal function within normal limits. Continue with daily dietary intake of food items with potassium.

## 2024-05-02 NOTE — ASSESSMENT & PLAN NOTE
Lab Results   Component Value Date    TSH 2.260 04/25/2024    [id number] 7.2 05/24/2023    FREET4 1.09 04/25/2024   Denies symptoms.   Continue with current levothyroxine 112 mcg daily.   Follow up 6 months

## 2024-05-02 NOTE — ASSESSMENT & PLAN NOTE
"Diagnosed in 4/16/24  Current diabetic medications:  - dietary management  Known diabetic complications: none    Weight trend:   BMI Readings from Last 2 Encounters:   05/02/24 40.62 kg/m²   04/16/24 40.62 kg/m²     Any episodes of hypoglycemia? no    Diabetes Management Status  Statin: Taking  ACE/ARB: Taking    Screening or Prevention Patient's value   HgA1C Testing and Control   Lab Results   Component Value Date    HGBA1C 7.3 (H) 04/25/2024        Lipid profile : 05/24/2023   LDL control Lab Results   Component Value Date    LDLCALC 68.0 05/24/2023      Nephropathy screening No results found for: "MICALBCREAT"   Blood pressure BP Readings from Last 1 Encounters:   05/02/24 94/62      Dilated retinal exam Most Recent Eye Exam Date: Not Found   Foot exam Most Recent Foot Exam Date: Not Found      "

## 2024-05-02 NOTE — ASSESSMENT & PLAN NOTE
Body mass index is 40.62 kg/m². Morbid obesity complicates all aspects of disease management from diagnostic modalities to treatment. Weight loss encouraged and health benefits explained to patient.     Given chronic inflammatory disease. Counseled on following a lactose and gluten free diet. Reduce intake of highly refined carbohydrates.     Recommendations:   Stay physically active. As tolerated alternate resistance training with stretching and cardio. Goal of 150 minutes per week of moderate intensity activity or 7,500 - 10,000 steps per day. Follow the Mediterranean Diet. Include whole fresh fruits, vegetables, olive oil, seeds, nuts, whole grains, cold water fish, salmon, mackerel and lean cuts of meat.  Do not drink sugary/diet carbonated beverages. Decrease portion sizes slightly which will result in an approximately 500-calorie deficit. Avoid fast or fried and processed food, especially canned foods. Avoid refined carbohydrates, white starchy foods, flour, white potato, bread, muffins, and cakes. Consider substituting one meal a day with a meal replacement such as Slim fast, lean cuisine, or weight watcher's. Follow a healthy diet that includes enough calcium, vitamin D and proteins for bone health.

## 2024-05-02 NOTE — ASSESSMENT & PLAN NOTE
Patient has recurrent depression which is mild and is currently controlled. Will Continue anti-depressant medications.  Patient does not display psychosis at this time. Continue to monitor closely and adjust plan of care as needed.  Continue with current daily medications, follow up with  specialist in June 2024.

## 2024-05-10 ENCOUNTER — OFFICE VISIT (OUTPATIENT)
Dept: OTOLARYNGOLOGY | Facility: CLINIC | Age: 44
End: 2024-05-10
Payer: MEDICAID

## 2024-05-10 VITALS
HEIGHT: 60 IN | SYSTOLIC BLOOD PRESSURE: 124 MMHG | WEIGHT: 209.44 LBS | HEART RATE: 82 BPM | DIASTOLIC BLOOD PRESSURE: 79 MMHG | BODY MASS INDEX: 41.12 KG/M2

## 2024-05-10 DIAGNOSIS — J32.9 CHRONIC SINUSITIS, UNSPECIFIED LOCATION: ICD-10-CM

## 2024-05-10 DIAGNOSIS — G47.30 SLEEP APNEA, UNSPECIFIED TYPE: ICD-10-CM

## 2024-05-10 DIAGNOSIS — M95.0 ACQUIRED NASAL DEFORMITY: ICD-10-CM

## 2024-05-10 DIAGNOSIS — Z98.890 HISTORY OF MYRINGOTOMY: ICD-10-CM

## 2024-05-10 DIAGNOSIS — H91.90 HEARING LOSS, UNSPECIFIED HEARING LOSS TYPE, UNSPECIFIED LATERALITY: ICD-10-CM

## 2024-05-10 DIAGNOSIS — J34.2 DEVIATED SEPTUM: ICD-10-CM

## 2024-05-10 DIAGNOSIS — E88.810 METABOLIC SYNDROME: ICD-10-CM

## 2024-05-10 DIAGNOSIS — H68.023 CHRONIC EUSTACHIAN SALPINGITIS, BILATERAL: Primary | ICD-10-CM

## 2024-05-10 DIAGNOSIS — H66.91 CHRONIC EAR INFECTION, RIGHT: ICD-10-CM

## 2024-05-10 DIAGNOSIS — F17.200 SMOKING: ICD-10-CM

## 2024-05-10 DIAGNOSIS — H66.006 RECURRENT ACUTE SUPPURATIVE OTITIS MEDIA WITHOUT SPONTANEOUS RUPTURE OF TYMPANIC MEMBRANE OF BOTH SIDES: ICD-10-CM

## 2024-05-10 PROCEDURE — 3078F DIAST BP <80 MM HG: CPT | Mod: CPTII,,, | Performed by: OTOLARYNGOLOGY

## 2024-05-10 PROCEDURE — 99204 OFFICE O/P NEW MOD 45 MIN: CPT | Mod: S$PBB,,, | Performed by: OTOLARYNGOLOGY

## 2024-05-10 PROCEDURE — 3051F HG A1C>EQUAL 7.0%<8.0%: CPT | Mod: CPTII,,, | Performed by: OTOLARYNGOLOGY

## 2024-05-10 PROCEDURE — 3074F SYST BP LT 130 MM HG: CPT | Mod: CPTII,,, | Performed by: OTOLARYNGOLOGY

## 2024-05-10 PROCEDURE — 3008F BODY MASS INDEX DOCD: CPT | Mod: CPTII,,, | Performed by: OTOLARYNGOLOGY

## 2024-05-10 PROCEDURE — 1160F RVW MEDS BY RX/DR IN RCRD: CPT | Mod: CPTII,,, | Performed by: OTOLARYNGOLOGY

## 2024-05-10 PROCEDURE — 4010F ACE/ARB THERAPY RXD/TAKEN: CPT | Mod: CPTII,,, | Performed by: OTOLARYNGOLOGY

## 2024-05-10 PROCEDURE — 99999 PR PBB SHADOW E&M-EST. PATIENT-LVL IV: CPT | Mod: PBBFAC,,, | Performed by: OTOLARYNGOLOGY

## 2024-05-10 PROCEDURE — 99214 OFFICE O/P EST MOD 30 MIN: CPT | Mod: PBBFAC,PN | Performed by: OTOLARYNGOLOGY

## 2024-05-10 PROCEDURE — 1159F MED LIST DOCD IN RCRD: CPT | Mod: CPTII,,, | Performed by: OTOLARYNGOLOGY

## 2024-05-10 RX ORDER — FLUTICASONE PROPIONATE 50 MCG
1 SPRAY, SUSPENSION (ML) NASAL 2 TIMES DAILY
Qty: 15.8 ML | Refills: 5 | Status: SHIPPED | OUTPATIENT
Start: 2024-05-10

## 2024-05-10 RX ORDER — BUPROPION HYDROCHLORIDE 150 MG/1
150 TABLET, EXTENDED RELEASE ORAL 2 TIMES DAILY
Qty: 60 TABLET | Refills: 11 | Status: SHIPPED | OUTPATIENT
Start: 2024-05-10 | End: 2024-05-16

## 2024-05-10 NOTE — PROGRESS NOTES
[facility]    Subjective:      Patient: Sadaf Dhaliwal Patient PCP: Good Conway,          :  1980     Sex:  female      MRN:  [id number]          Date of Visit: 05/10/2024      Chief Complaint: Otitis Media (Bilateral, but mainly in right), Nasal Congestion (On and off for years), and Cough      Patient ID: Sadaf Dhaliwal is a 43 y.o. female     Patient is a  greater than 20 pack year current smoker with a past medical history of immunosuppression with Imuran Humira for Crohn's disease, hypothyroidism, type 2 diabetes, migraine on Ubrelvy and Elavil, HTN, HLD referred to me by Dr. Good Conway in consultation for right-greater-than-left quit otitis media nasal congestion cough.    No audiogram.  No head/ear imaging.    Patient reports dramatic snoring and witnessed apneas with daytime fatigue.  Comorbidities has a above.      Multiple sets of tubes (reported 6 times) in childhood none in recent ears.    Prior nasal trauma nasal reconstructive surgery with chronic nasal obstruction.      We will up to quit smoking has had no benefit from prior nicotine supplementation/replacement therapy.    Labs:  WBC   Date Value Ref Range Status   2024 8.37 3.90 - 12.70 K/uL Final     Hemoglobin   Date Value Ref Range Status   2024 14.4 12.0 - 16.0 g/dL Final     Platelets   Date Value Ref Range Status   2024 231 150 - 450 K/uL Final     Creatinine   Date Value Ref Range Status   2024 1.0 0.5 - 1.4 mg/dL Final     TSH   Date Value Ref Range Status   2024 2.260 0.400 - 4.000 uIU/mL Final     Comment:     ATTENTION: The use of Biotin Supplements may interfere with this   assay.       Hemoglobin A1C   Date Value Ref Range Status   2024 7.3 (H) 4.0 - 5.6 % Final     Comment:     ADA Screening Guidelines:  5.7-6.4%  Consistent with prediabetes  >or=6.5%  Consistent with diabetes    High levels of fetal hemoglobin interfere with the HbA1C  assay. Heterozygous  hemoglobin variants (HbS, HgC, etc)do  not significantly interfere with this assay.   However, presence of multiple variants may affect accuracy.         Past Medical History  She has a past medical history of ADHD (attention deficit hyperactivity disorder), Allergies, Anxiety, Arthritis, Autism, Bipolar disorder, Chronic nausea, Crohn's colitis, Depression, Heart murmur, High cholesterol, History of rectal surgery, Hypertension, Hypothyroidism, Neuropathy, and Pneumonia due to COVID-19 virus.    Family / Surgical / Social History  Her family history includes Breast cancer in her mother; Lung cancer in her mother; No Known Problems in her daughter, father, maternal aunt, maternal grandfather, maternal grandmother, maternal uncle, paternal aunt, paternal grandfather, paternal grandmother, paternal uncle, and sister; Ovarian cancer in her mother.    Past Surgical History:   Procedure Laterality Date    APPENDECTOMY       SECTION      CHOLECYSTECTOMY      COLONOSCOPY N/A 2020    Procedure: COLONOSCOPY;  Surgeon: Brittnee Dueñas MD;  Location: [facility];  Service: Endoscopy;  Laterality: N/A;    COLONOSCOPY N/A 2021    Procedure: COLONOSCOPY;  Surgeon: Brittnee Dueñas MD;  Location: [facility];  Service: Endoscopy;  Laterality: N/A;  needs rapid COVID    NASAL FRACTURE SURGERY      tubes in ears 6 times         Social History     Tobacco Use    Smoking status: Every Day     Current packs/day: 1.00     Average packs/day: 1 pack/day for 23.7 years (23.7 ttl pk-yrs)     Types: Cigarettes     Start date: 2000     Passive exposure: Never    Smokeless tobacco: Never   Substance and Sexual Activity    Alcohol use: Never    Drug use: Not Currently     Types: Marijuana    Sexual activity: Not Currently     Partners: Female       Medications  She has a current medication list which includes the following prescription(s): humira pen [id number]-uc-[id number] start, amitriptyline, azathioprine, guanfacine,  hydrochlorothiazide, levocetirizine, levothyroxine, losartan, metformin, rosuvastatin, ubrelvy, and valacyclovir.      Allergies  Review of patient's allergies indicates:   Allergen Reactions    Clindamycin Swelling     Tongue swellong    Iodine and iodide containing products Nausea And Vomiting    Aspirin      Pt reports vomiting blood after taking.    Dayhist allergy [clemastine]      Other reaction(s): numbness to head    Flu vac 2023 [id number](pf)      Other Reaction(s): nausea and vomiting       All medications, allergies, and past history have been reviewed.    Objective:      Vitals:      4/16/2024    11:27 AM 5/2/2024     2:40 PM 5/10/2024     2:42 PM   Vitals - 1 value per visit   SYSTOLIC 104 94 124   DIASTOLIC 67 62 79   Pulse 67 90 82   Temp 98 °F (36.7 °C) 97.9 °F (36.6 °C)    Resp 18 16    SPO2 97 % 95 %    Weight (lb) 208 208 209.44   Weight (kg) 94.348 94.348 95   Height 5' (1.524 m) 5' (1.524 m) 5' (1.524 m)   BMI (Calculated) 40.6 40.6 40.9   Pain Score Zero Two Zero       Body surface area is 2.01 meters squared.    Physical Exam:    GENERAL  APPEARANCE -  alert, appears stated age, and cooperative  BARRIER(S) TO COMMUNICATION -  none VOICE - smokers VOICE mildly hypo nasal as well not breathy or wet    INTEGUMENTARY  no suspicious head and neck lesions    HEENT  HEAD: Normocephalic, without obvious abnormality, atraumatic  FACE: INSPECTION - Symmetric, no signs of trauma, no suspicious lesion(s)      STRENGTH - facial symmetry intact     PALPATION -  No masses     SALIVARY GLANDS - non-tender with no appreciable mass    NECK/THYROID: normal atraumatic, no neck masses, normal thyroid, no jvd    EYES  Normal occular alignment and mobility with no visible nystagmus at rest    EARS/NOSE/MOUTH/THROAT  EARS  PINNAE AND EXTERNAL EARS - no suspicious lesion OTOSCOPIC EXAM (surgical microscopy was used for visualization/instrumentation): EAR EXAM - both ears with retraction down to the incudostapedial  joint without obvious adhesion or any cholesteatoma.  Some wet wax on the tympanic membrane on the left side but no appreciable perforation but what appears to possibly be a few wisps of fluid.  Right with more prominent scarring and an anterior-inferior monomeric segment retraction but no cholesteatoma or gross effusion.  HEARING - diminished but intact    NOSE AND SINUSES  EXTERNAL NOSE - flat broad scarred nasal dorsum  SEPTUM - S deformity with bilateral right-greater-than-left obstruction TURBINATES - edematous congested 2-3 + MUCOSA - edematous congested pink with moderately thick mucus without purulence     MOUTH AND THROAT   ORAL CAVITY, LIPS, TEETH, GUMS & TONGUE - Ribera 3b  OROPHARYNX /TONSILS/PHARYNGEAL WALLS/HYPOPHARYNX - no erythema or exudates  NASOPHARYNX - limited mirror exam - unable to visualize due to anatomy/gag  LARYNX -  - limited mirror exam - unable to visualize due to anatomy/gag      CHEST AND LUNG   INSPECTION & AUSCULTATION - normal effort, no stridor    CARDIOVASCULAR  AUSCULTATION & PERIPHERAL VASCULAR - regular rate and rhythm.    NEUROLOGIC  MENTAL STATUS - alert, interactive CRANIAL NERVES - normal    LYMPHATIC  HEAD AND NECK - non-palpable; SUPRACLAVICULAR - deferred      Procedure(s):  None          Assessment:      Problem List Items Addressed This Visit          ENT    Chronic ear infection, right     Other Visit Diagnoses       Chronic eustachian salpingitis, bilateral    -  Primary    Acquired nasal deformity        Deviated septum        Chronic sinusitis, unspecified location        Hearing loss, unspecified hearing loss type, unspecified laterality        Recurrent acute suppurative otitis media without spontaneous rupture of tympanic membrane of both sides        History of myringotomy        Metabolic syndrome        Sleep apnea, unspecified type        BMI 40.0-44.9, adult        Smoking                     Plan:      Smoking cessation with Zyban as discussed.  If  unable to quit 2 weeks into therapy set a quit date farther down the road with a particular plan to wean down daily or weekly to that quit date.      Sleep apnea (polysomnography) testing as ordered.    CT scanning in high-resolution of the sinuses for evaluation of nose, sinuses and middle ears.      Audiologic testing.      Nasal steroids and sinus rinses.      Laryngoscopy on return.        Voice recognition software was used in the creation of this note/communication and any sound-alike errors which may have occurred from its use should be taken in context when interpreting.  If such errors prevent a clear understanding of the note/communication, please contact the office for clarification.

## 2024-05-10 NOTE — PATIENT INSTRUCTIONS
Smoking cessation with Zyban as discussed.  If unable to quit 2 weeks into therapy set a quit date farther down the road with a particular plan to wean down daily or weekly to that quit date.      Sleep apnea (polysomnography) testing as ordered.    CT scanning in high-resolution of the sinuses for evaluation of nose, sinuses and middle ears.      Audiologic testing.      Nasal steroids and sinus rinses.      Laryngoscopy on return.    Voice recognition software was used in the creation of this note/communication and any sound-alike errors which may have occurred from its use should be taken in context when interpreting.  If such errors prevent a clear understanding of the note/communication, please contact the office for clarification.      NASAL SALINE    Still saline comes in many preparations including sprays/mists, gels, and rinses.  Different preparations served different purposes.  Saline spray helps to briefly moisturize the nose and help clear mucus.  Saline gels coat the nose for longer protective benefit of keeping the linings the nose moist.  Saline rinses clear the nose and sinuses and a more thorough way in her best used for significant postnasal drip and sinus complaints.  A combination of saline sprays/mists, gels and rinses should be used to address routine nasal clearing and dryness issues as well as flushing for better control of allergy and postnasal drip symptoms.  There is no real risk of over use of nasal saline products.  Saline sprays do not have any of the potential rebound or addiction of nasal decongestant sprays.  Nasal saline sprays and rinses should be used prior to the application of any medicated nasal sprays such as nasal steroids or nasal antihistamine sprays.        INTRANASAL STEROID SPRAYS      Intranasal steroid sprays are available both by prescription and over-the-counter both in generic and brand name preparations.  They are all very similar in efficacy and side effect  profiles.  Over-the-counter and prescription intranasal steroids include fluticasone propionate (Flonase), fluticasone furoate (Sensimist), triamcinolone (Nasacort), and budesonide (Rhinocort).  While these medications are available as prescriptions as well there are few nasal steroids in her available by prescription only and include mometasone (Nasonex), flunisolide (Nasarel), and beclomethasone (QNASL).    Nasal steroids or the foundation of treatment of both allergy and other inflammatory conditions of the nose and sinuses.  They are safe for regular use and while there are many side effects listed most of these are steroid class effects and not typically encountered.  Typical side effects include dryness and even ulceration and bleeding of the nose.  These side effects can be minimized by proper application and proper moisturization with saline and saline gel.    Sometimes changing between 1 brand of nasal steroid and another can result in improved control of symptoms especially after long term use of one particular nasal steroid.    Proper application of the nasal steroid spray is accomplished by spraying towards the I/ear on the same side with the tip of the superior just barely inside the nostril with the chin slightly downward.  Any dripping should be gently inhaled not sniff test backwards into the throat.  Labeled instructions should be followed.        SINUS RINSE INSTRUCTIONS    Nasal Saline Irrigation Instructions  You can wash your nasal and sinus passages using nasal saline (salt water) irrigation. This   is simple and effective. Follow the instructions below, as well as the ones provided by your   physician.  Supplies  First, you will need a nasal saline irrigation bottle and rinse solution.   You can purchase nasal rinse kits that include these items (such as   NeilMed®, Ayr®, Simply Saline®, Ocean Complete®) at most drug   stores. You can also make your own saline irrigation solution by   adding  kosher (non-iodine) salt and baking soda to distilled water.   Your physician may tell you to add medications like a steroid or   antibiotic to the rinse as needed.  Steps for nasal irrigation  Step 1. Fill the bottle  ? Wash your hands.  ? Fill the irrigation bottle with lukewarm distilled water or boiled water that has cooled.  Step 2. Mix the solution  ? Put the saline and salt packet contents into the bottle.  ? Tighten the top of the bottle and shake it gently to dissolve the mixture.  ? If you are making your own solution:   - Add 1/4 to 1/2 teaspoon of baking soda and 1/8 teaspoon of kosher (non-iodine) salt   into the bottle.   - Tighten the top of the bottle.   - Shake the bottle gently to dissolve the mixture.  Step 3. Get into position  ?  front of the sink.  ? Unless you were instructed to use another position, bend forward.   Then tilt your face down about 45 degrees so that you are looking   down into the sink.  ? Gently place the spout of the saline bottle against 1 of your nostrils.  [facility]  CARE AND TREATMENT  Patient Education  ©2018 [employer]  ©2018 [employer]  Step 4. Rinse  ? Breathing through your mouth, gently squeeze the   bottle. This will squirt the solution into your nostril. The   solution will start to drain from the other nostril. Some   may drain from your mouth. This is normal.  ? Use 2 ounces (half of the bottle) on each nostril.  ? Afterwards, you may need to blow your nose gently to   help drain any solution that is left behind.  Step 5. Repeat  ? Repeat steps 3 and 4 with the other nostril.  You can watch a video to learn how to do nasal saline irrigation. Go to [email or web address] and   search for NeilMed Sinus Rinse.  Step 6.  Clean the bottle and cap. Air dry the Sinus Rinse bottle, cap, and tube on a clean paper towel, a lint free towel, or use NeilMed® NasaDOCK® or NasaDOCK plus (sold separately) to store the  bottle, cap and tube.  Please read Warnings before using.  Our recommendation is to replace the bottle every three months.      NEILMED CLEROSEMARIEG INSTRUCTIONS    It is very important to keep these devices clean and free from any contamination. Replace the bottle every 3 months.  NasaDock Plus  NasaDock NeilMed® SINUS RINSE Squeeze Bottle: Please perform routine inspections of the bottle and tube for any discolorations and cracks. If there are any visual signs of deterioration or permanent color changes, please clean thoroughly. If the discolorations remain after cleansing, discard the items and purchase new ones. Please follow these instructions after each use of the product. Be sure to replace your product after three months.  Step 1: Rinse the cap, tube and bottle using running water. Fill the bottle with distilled, micro-filtered (through 0.2 micron), reverse osmosis filtered, commercially bottled or previously boiled and cooled down water at lukewarm or body temperature..  Step 2: Add a few drops of dish washing liquid or baby shampoo.  Step 3: Attach the cap and tube to the bottle; hold your finger over the opening in the cap and shake the bottle vigorously.  Step 4: Squeeze the bottle hard to allow the soapy solution to clean the interior of the tube and the cap. Empty out the bottle completely.  Step 5: Rinse the soap from the bottle, cap and tube thoroughly and place the items on a clean paper towel to dry or use the preferred NasaDOCK® or NasaDOCK plus.    The NasaDOCK® is a simple, hygienic way to dry and store the SINUS RINSE bottle, cap and tube. NasaDOCK® comes with various hanging options and is available in different colors. Our newest model also offers storage for our SINUS RINSE mixture packets. We strongly suggest using NasaDOCK® as an inexpensive, easy way to dry the cap, tube and SINUS RINSE bottle.        Cleaning:  Do not use a  to clean the inside of a bottle. While our  bottle is  safe, a  will not adequately clean the SINUS RINSE bottle. The water jets in dishwashers cannot enter the narrow neck of the bottle, and portions of the bottles interior will not be cleaned thoroughly. Additional methods of cleaning the bottle include the use of concentrated white vinegar or isopropyl alcohol (70% concentration), followed by scrubbing and rinsing as described above.       Microwave Disinfection  Clean the device with soap and water as mentioned above and shake off the excess water. Now place the bottle, cap and tube in the microwave for 40 seconds. This will disinfect the bottle, cap and tube. If the microwave has been used recently, please make sure that the inside of the microwave has cooled back down to room temperature before using it to disinfect the bottle.    NeilMed NasaFlo® Neti Pot Users:  Use the same procedure as above.    Sinugator® Cleaning Directions:  Clean the Sinugator® by running plain water and dry with a clean lint free towel and then air dry the unit by keeping it open to the air. The nasal  tip, blue reservoir and white soft tube can be disinfected by cleaning with soap and water and shaking off the excess water before placing in the home microwave for 60 seconds. Clean the entire unit with a few drops of dishwashing liquid and water every three days to keep the unit clean. As a fi nal rinse to wash off any residual soap or tap water, use either distilled, micro-filtered (through 0.2 micron filter), commercially bottled or previously boiled & cooled down water. Please make sure to rinse thoroughly during each wash so no soap is left behind. DO NOT place the white motor unit in microwave for disinfection. Because of the units stainless steel components, this can cause damage or fire hazards.    General Principles of Maintenance & Storage:  When permissible use a microwave periodically to disinfect devices. Always store NeilMed®  products in a cool and dry place with adequate ventilation. NasaDOCK® or NasaDOCK plus offer a simple hygienic way to air dry & neatly store the bottle, cap, tube and NasaFlo. Do not store the bottle with the cap screwed on, unless both are dry. Do not store the wet parts in a sealed plastic bag. If you travel before they are dry, wrap parts separately in paper towels. Hand soap or shampoo can be used for cleaning parts while away from home.        USE ONLY AS DIRECTED, IF SYMPTOMS PERSIST SEE YOUR DOCTOR/HEALTHCARE PROFESSIONAL. ALWAYS READ THE LABEL.

## 2024-05-23 PROBLEM — S83.281D ACUTE LATERAL MENISCAL TEAR, RIGHT, SUBSEQUENT ENCOUNTER: Status: ACTIVE | Noted: 2024-05-23

## 2024-06-10 ENCOUNTER — PATIENT MESSAGE (OUTPATIENT)
Dept: ADMINISTRATIVE | Facility: OTHER | Age: 44
End: 2024-06-10
Payer: MEDICAID

## 2024-06-19 ENCOUNTER — PATIENT OUTREACH (OUTPATIENT)
Dept: ADMINISTRATIVE | Facility: HOSPITAL | Age: 44
End: 2024-06-19
Payer: MEDICAID

## 2024-06-19 DIAGNOSIS — Z12.31 ENCOUNTER FOR SCREENING MAMMOGRAM FOR BREAST CANCER: Primary | ICD-10-CM

## 2024-06-20 ENCOUNTER — CLINICAL SUPPORT (OUTPATIENT)
Dept: REHABILITATION | Facility: HOSPITAL | Age: 44
End: 2024-06-20
Attending: ORTHOPAEDIC SURGERY
Payer: MEDICAID

## 2024-06-20 DIAGNOSIS — Z98.890 S/P MENISCECTOMY: Primary | ICD-10-CM

## 2024-06-20 DIAGNOSIS — I10 PRIMARY HYPERTENSION: ICD-10-CM

## 2024-06-20 DIAGNOSIS — R09.81 NOSE CONGESTION: ICD-10-CM

## 2024-06-20 DIAGNOSIS — S83.281D ACUTE LATERAL MENISCAL TEAR, RIGHT, SUBSEQUENT ENCOUNTER: ICD-10-CM

## 2024-06-20 DIAGNOSIS — J30.89 ENVIRONMENTAL AND SEASONAL ALLERGIES: ICD-10-CM

## 2024-06-20 PROCEDURE — 97161 PT EVAL LOW COMPLEX 20 MIN: CPT

## 2024-06-20 PROCEDURE — 97110 THERAPEUTIC EXERCISES: CPT

## 2024-06-20 RX ORDER — LOSARTAN POTASSIUM 100 MG/1
TABLET ORAL
Qty: 30 TABLET | Refills: 2 | Status: SHIPPED | OUTPATIENT
Start: 2024-06-20

## 2024-06-20 RX ORDER — LEVOCETIRIZINE DIHYDROCHLORIDE 5 MG/1
TABLET, FILM COATED ORAL
Qty: 30 TABLET | Refills: 2 | Status: SHIPPED | OUTPATIENT
Start: 2024-06-20

## 2024-06-20 NOTE — PLAN OF CARE
Physical Therapy Initial Evaluation     Name: Sadaf Medina [facility] Number: [id number]    Sadaf is a 43 y.o. female evaluated on 06/20/2024.     Diagnosis:   Encounter Diagnosis   Name Primary?    Acute lateral meniscal tear, right, subsequent encounter      Physician: Jimenez Sandoval MD  Treatment Orders: Physical therapy evaluate and treat         Past Medical History:   Diagnosis Date    ADHD (attention deficit hyperactivity disorder)     Allergies     Anxiety     Arthritis     Autism     Bipolar disorder     Chronic nausea     Crohn's colitis     Depression     Heart murmur     High cholesterol     History of rectal surgery     Hypertension     Hypothyroidism     Neuropathy     Pneumonia due to COVID-19 virus 08/19/2021     Current Outpatient Medications   Medication Sig    adalimumab (HUMIRA PEN CROHNS-[facility]-HS START) 40 mg/0.8 mL PnKt     amitriptyline (ELAVIL) 100 MG tablet Take 100 mg by mouth every evening.    amLODIPine (NORVASC) 5 MG tablet TAKE ONE TABLET BY MOUTH ONCE DAILY -- NEED PAST DUE APPOINTMENT --    azaTHIOprine (IMURAN) 50 mg Tab     fluticasone propionate (FLONASE) 50 mcg/actuation nasal spray 1 spray (50 mcg total) by Each Nostril route 2 (two) times a day. 1 spray EVERY morning and afternoon  toward the ear each side after a sinus rinse    hydroCHLOROthiazide (HYDRODIURIL) 12.5 MG Tab Take 1 tablet (12.5 mg total) by mouth once daily. **THANK YOU**    HYDROcodone-acetaminophen (NORCO) 5-325 mg per tablet Take 1 tablet by mouth every 6 (six) hours as needed for Pain.    levocetirizine (XYZAL) 5 MG tablet TAKE ONE TABLET BY MOUTH EVERY EVENING **THANK YOU**    levothyroxine (SYNTHROID) 112 MCG tablet Take 1 tablet (112 mcg total) by mouth before breakfast.    losartan (COZAAR) 100 MG tablet Take 1 tablet (100 mg total) by mouth once daily. **THANK YOU**    meloxicam (MOBIC) 15 MG tablet Take 1 tablet (15 mg total) by mouth once daily.     "metFORMIN (GLUCOPHAGE-XR) 500 MG ER 24hr tablet Take 1 tablet (500 mg total) by mouth 2 (two) times daily with meals.    miconazole NITRATE 2 % (MICOTIN) 2 % top powder Apply topically as needed for Itching.    rosuvastatin (CRESTOR) 40 MG Tab Take 1 tablet (40 mg total) by mouth once daily.    UBRELVY 100 mg tablet Take by mouth.    valACYclovir (VALTREX) 500 MG tablet Take 500 mg by mouth 2 (two) times daily.     No current facility-administered medications for this visit.     Facility-Administered Medications Ordered in Other Visits   Medication    mupirocin 2 % ointment     Review of patient's allergies indicates:   Allergen Reactions    Clindamycin Swelling     Tongue swellong    Iodine and iodide containing products Nausea And Vomiting    Aspirin      Pt reports vomiting blood after taking.    Dayhist allergy [clemastine]      Other reaction(s): numbness to head    Flu vac 2023 [id number](pf)      Other Reaction(s): nausea and vomiting    Wellbutrin [bupropion hcl]      Makes angry     Precautions:   Latex Allergy  Fall precautions  POSTOPERATIVE PLAN:  Weight bear as tolerated   Range of motion as tolerated         Subjective     Patient States: (right) meniscectomy May 23, 2024 and she reports she fell twice the day of the surgery at home. Patient reports that she has had significant pain generalized (right) knee but especially lateral aspect of (left) knee as well as tightness hamstring and calf (right) LE. Patient reports walking around home without assistive device but balance alterations at times. Patient reports (right) knee pain currently 9/10. Patient reports no fall since initial falls but there have been times where her wife or son have had to help her stabilize herself after LOB. Patient describes pain as "sharp pain that does not go away." Any weightbearing or mobility increases symptoms. Patient reports standing tolerance 5-10 minutes. Patient reports (right) knee feels like it's going to give " out multiple times. Patient reports she returns to ortho in a few weeks for a repeat MRI of (right) knee.   Patient Goals: decrease pain, ambulate without pain, return to recreational activites, learn HEP, and return to daily activities with minimal to no complaints.       Objective     Observation: Patient guarded with movements (right) LE, difficulty with transitional movement in clinic    Posture: slumped posture, pronation (right) foot/ankle    Range of Motion: Knee   Left Right   Flexion: WFL 80 (85)   Extension WFL Lacking 5     Strength: Knee   Left Right   Quadriceps 4+/5 3-/5   Hamstrings 4+/5 3-/5        Lower Extremity Strength  Right LE  Left LE    Knee extension: 3-/5 Knee extension: 4+/5   Knee flexion: 3-/5 Knee flexion: 4+/5   Hip flexion: 3-/5 Hip flexion: 4+/5   Hip extension:  3-/5 Hip extension: 4+/5   Hip abduction: 3-/5 Hip abduction: 4+/5   Hip adduction: 3-/5 Hip adduction 4+/5   Ankle dorsiflexion: 4-/5 Ankle dorsiflexion: 4+/5   Ankle plantarflexion: 4-/5 Ankle plantarflexion: 4+/5         Special Tests: Knee No instability noted at this time. Positive Mc Salazar's (right) knee medial and lateral.     Joint Mobility: hypomobile  Palpation: tenderness to palpation generalized but especially lateral aspect (right) knee  Sensation: intact to light touch    Edema:  Left: absent  Right: minimal    Girth   Left Right   Joint Line 38 cm 40 cm       Gait: Madhuri ambulated with none.  Level of Assistance: independent  Patient displays antalgic gait. Decreased heel strike, decreased weight shifting, decreased maynor, decreased foot clearance.     FOTO 22 See media for complete details.     TREATMENT     Time In: 0800  Time Out: 0855    PT Evaluation Completed? Yes  Discussed Plan of Care with patient: Yes    Sadaf received 20 minutes of therapeutic exercise including:   Development, demonstration and performance of home exercise program including Heel slides, SLR, QS, AP, LAQ    Sadaf received 8  minutes of manual therapy including:  STM to anterior and posterior knee complex.     Written Home Exercises Provided: see note section for details.   Sadaf demonstrated fair understanding of the education provided. Patient demonstrated fair return demonstration of skill of exercises.    ASSESSMENT   Pt prognosis is Fair.  Pt will benefit from skilled outpatient physical therapy to address the above stated deficits, provide pt/family education and to maximize pt's level of independence.     Medical necessity is demonstrated by the following IMPAIRMENTS/PROBLEMS:  1. Decreased ROM  2. Decreased strength  3. Increased pain  4. Increased swelling  5. Increased gait deviations  6. Decreased tolerance to functional activities.       Pt's spiritual, cultural and educational needs considered and pt agreeable to plan of care and goals as stated below:     Anticipated Barriers for physical therapy:     Short Term GOALS: 3 weeks. Pt agrees with goals set.  1. Patient demonstrates independence with HEP.   2. Patient demonstrates postural awareness with all activities.   3. Patient reports no falls during functional activities.     Long Term GOALS: 6  weeks. Pt agrees with goals set.  1. Patient demonstrates improvement in FOTO score to 52 or greater to demonstrate improvement in functional activity tolerance.   2. Patient demonstrates improvement in LE strength by 1/2 grade or greater.   3. Patient demonstrates (right) knee range of motion WNL.   4. Patient demonstrates decreased frequency, intensity and duration of pain symptoms.   5. Patient report improvement in standing tolerance to 30 minutes to perform daily activities.   6. Goals PRN.       PLAN     Outpatient physical therapy 2 times weekly to include: pt ed, hep, therapeutic exercises, neuromuscular re-education/ balance exercises,Manual therapy and modalities prn. Cont PT for  6 weeks. Pt may be seen by PTA as part of the rehabilitation team.   Certification dates:  6/20/2024-8/2/2024    Therapist: Carmelita Jimenez, PT    I certify the need for these services furnished under this plan of treatment and while under my care.  ____________________________________ Physician/Referring Practitioner   Date of Signature

## 2024-06-20 NOTE — PROGRESS NOTES
POSTOPERATIVE PLAN:  Weight bear as tolerated   Range of motion as tolerated   Follow up 2 weeks in clinic for wound check   We will assess need for physical therapy at that time          HOME EXERCISE PROGRAM  Created by Carmelita Jimenez  Jun 20th, 2024  View videos at [email or web address]        STRAIGHT LEG RAISE - SLR    While lying on your back, raise up your leg with a straight knee. Keep the opposite knee bent with the foot planted on the ground.    Video # [id number] Repeat 10 Times   Hold 1 Second   Complete 3 Sets   Perform 2 Times a Day          HEEL SLIDES - SUPINE    Lying on your back with knees straight, slide the affected heel towards your buttock as you bend your knee.    Hold a gentle stretch in this position and then return to original position.    Video # [id number] Repeat 10 Times   Hold 5 Seconds   Complete 3 Sets   Perform 2 Times a Day          QUAD SET - TOWEL UNDER KNEE - ISOMETRIC QUADS    Place a small towel roll under your knee, tighten your top thigh muscle to press the back of your knee downward while pressing on the towel.    Video # [id number] Repeat 10 Times   Hold 5 Seconds   Complete 3 Sets   Perform 2 Times a Day          Ankle Pumps    Bend your foot up and down at your ankle joint    Note: Keep on doing Ankle Pumps throughout the day, as it is most important exercise for leg blood circulation, prevents blood clotting and swelling Repeat 30 Times   Complete 1 Set   Perform 1 Times an Hour          LAQ    Sit in a chair and extend one knee at a time. Maintain a tall posture. Repeat 10 Times   Hold 5 Seconds   Complete 3 Sets   Perform 2 Times a Day

## 2024-06-26 ENCOUNTER — HOSPITAL ENCOUNTER (EMERGENCY)
Facility: HOSPITAL | Age: 44
Discharge: HOME | End: 2024-06-26
Attending: EMERGENCY MEDICINE
Payer: MEDICAID

## 2024-06-26 VITALS
WEIGHT: 197 LBS | TEMPERATURE: 100 F | RESPIRATION RATE: 16 BRPM | SYSTOLIC BLOOD PRESSURE: 133 MMHG | OXYGEN SATURATION: 95 % | HEIGHT: 60 IN | HEART RATE: 93 BPM | DIASTOLIC BLOOD PRESSURE: 82 MMHG | BODY MASS INDEX: 38.68 KG/M2
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
## 2024-06-26 DIAGNOSIS — G47.30 INSOMNIA WITH SLEEP APNEA: Primary | ICD-10-CM

## 2024-06-26 DIAGNOSIS — M25.561 KNEE PAIN, RIGHT: ICD-10-CM

## 2024-06-26 DIAGNOSIS — M25.561 ACUTE PAIN OF RIGHT KNEE: ICD-10-CM

## 2024-06-26 DIAGNOSIS — G47.00 INSOMNIA WITH SLEEP APNEA: Primary | ICD-10-CM

## 2024-06-26 DIAGNOSIS — M79.604 RIGHT LEG PAIN: Primary | ICD-10-CM

## 2024-06-26 PROCEDURE — 99284 EMERGENCY DEPT VISIT MOD MDM: CPT | Mod: 25

## 2024-06-26 PROCEDURE — 25000003 PHARM REV CODE 250

## 2024-06-26 RX ORDER — OXYCODONE AND ACETAMINOPHEN 5; 325 MG/1; MG/1
1 TABLET ORAL EVERY 8 HOURS PRN
Qty: 12 TABLET | Refills: 0 | Status: SHIPPED | OUTPATIENT
Start: 2024-06-26

## 2024-06-26 RX ORDER — OXYCODONE AND ACETAMINOPHEN 5; 325 MG/1; MG/1
1 TABLET ORAL
Status: COMPLETED | OUTPATIENT
Start: 2024-06-26 | End: 2024-06-26

## 2024-06-26 RX ADMIN — OXYCODONE HYDROCHLORIDE AND ACETAMINOPHEN 1 TABLET: 5; 325 TABLET ORAL at 05:06

## 2024-06-26 NOTE — ED PROVIDER NOTES
Encounter Date: 2024       History     Chief Complaint   Patient presents with    Leg Pain     Patient reported a trip and fall from standing height yesterday onto her right leg. Patient took Ibuprofen at approx 11 AM.      This note is dictated on M*Modal word recognition program.  There are word recognition mistakes and grammatical errors that are occasionally missed on review.     aSdaf Dhaliwal is a 43 y.o. female presents to ER today with complaints of slip and fall yesterday injuring her right thigh, right knee.  Patient reports she was trying to get her dog from under the bed when she slipped fell injuring her right lower extremity.  Patient rates pain 8/10.      The history is provided by the patient.     Review of patient's allergies indicates:   Allergen Reactions    Clindamycin Swelling     Tongue swellong    Iodine and iodide containing products Nausea And Vomiting    Aspirin      Pt reports vomiting blood after taking.    Dayhist allergy [clemastine]      Other reaction(s): numbness to head    Flu vac  [id number](pf)      Other Reaction(s): nausea and vomiting    Wellbutrin [bupropion hcl]      Makes angry     Past Medical History:   Diagnosis Date    ADHD (attention deficit hyperactivity disorder)     Allergies     Anxiety     Arthritis     Autism     Bipolar disorder     Chronic nausea     Crohn's colitis     Depression     Heart murmur     High cholesterol     History of rectal surgery     Hypertension     Hypothyroidism     Neuropathy     Pneumonia due to COVID-19 virus 2021     Past Surgical History:   Procedure Laterality Date    APPENDECTOMY       SECTION      CHOLECYSTECTOMY      COLONOSCOPY N/A 2020    Procedure: COLONOSCOPY;  Surgeon: Brittnee Dueñas MD;  Location: [facility];  Service: Endoscopy;  Laterality: N/A;    COLONOSCOPY N/A 2021    Procedure: COLONOSCOPY;  Surgeon: Brittnee Dueñas MD;  Location: [facility];  Service: Endoscopy;   Laterality: N/A;  needs rapid COVID    KNEE ARTHROSCOPY W/ MENISCECTOMY Right 5/23/2024    Procedure: ARTHROSCOPY, KNEE, WITH MENISCECTOMY;  Surgeon: Jimenez Sandoval MD;  Location: [facility];  Service: Orthopedics;  Laterality: Right;    NASAL FRACTURE SURGERY      tubes in ears 6 times       Family History   Problem Relation Name Age of Onset    Breast cancer Mother      Ovarian cancer Mother      Lung cancer Mother      No Known Problems Father      No Known Problems Sister 3     No Known Problems Daughter 1     No Known Problems Maternal Aunt      No Known Problems Maternal Uncle      No Known Problems Paternal Aunt      No Known Problems Paternal Uncle      No Known Problems Maternal Grandmother      No Known Problems Maternal Grandfather      No Known Problems Paternal Grandmother      No Known Problems Paternal Grandfather      BRCA 1/2 Neg Hx       Social History     Tobacco Use    Smoking status: Every Day     Current packs/day: 1.00     Average packs/day: 1 pack/day for 23.9 years (23.9 ttl pk-yrs)     Types: Cigarettes     Start date: 8/16/2000     Passive exposure: Never    Smokeless tobacco: Never   Substance Use Topics    Alcohol use: Never    Drug use: Not Currently     Types: Marijuana     Review of Systems   Constitutional: Negative.    Eyes: Negative.    Endocrine: Negative.    Genitourinary: Negative.    Musculoskeletal:  Positive for arthralgias, gait problem and myalgias.   Allergic/Immunologic: Negative.    Hematological: Negative.    Psychiatric/Behavioral: Negative.     All other systems reviewed and are negative.      Physical Exam     Initial Vitals [06/26/24 1651]   BP Pulse Resp Temp SpO2   133/82 93 18 100 °F (37.8 °C) 95 %      MAP       --         Physical Exam    Constitutional: She appears well-developed and well-nourished. She is not diaphoretic. No distress.   HENT:   Head: Normocephalic and atraumatic.   Eyes: EOM are normal. Right eye exhibits no discharge.   Neck: Neck supple.    Cardiovascular:  Normal rate.           No murmur heard.  Pulmonary/Chest: Breath sounds normal.   Abdominal: Abdomen is soft.   Musculoskeletal:         General: Tenderness and edema present.      Cervical back: Neck supple.      Comments: Patient has tenderness to right knee, tenderness to right thigh.  Right lower extremity neurovascular intact on examination     Neurological: She is oriented to person, place, and time. GCS score is 15. GCS eye subscore is 4. GCS verbal subscore is 5. GCS motor subscore is 6.   Skin: Capillary refill takes less than 2 seconds.   Psychiatric: She has a normal mood and affect.         ED Course   Procedures  Labs Reviewed - No data to display       Imaging Results              X-Ray Knee 1 or 2 View Right (In process)                      X-Ray Hip 2 or 3 views Right with Pelvis when performed (In process)                      Medications   oxyCODONE-acetaminophen 5-325 mg per tablet 1 tablet (1 tablet Oral Given 6/26/24 1716)     Medical Decision Making  Differential diagnosis include knee fracture knee sprain, meniscus injury, hip fracture, femur fracture, mechanical fall, musculoskeletal pain    No obvious fracture or dislocation to hip or x-ray or knee x-ray.    Will treat patient's musculoskeletal pain with short-term course of Percocet.    Patient advised to rest, follow rice protocol for musculoskeletal injury.  Right lower extremity neurovascular intact on examination today.  Patient stable at time of discharge in no acute distress.  No life-threatening illnesses were found during ER visit today.  Patient was instructed to follow-up with PCP or other recommended specialist within the next 48-72 hours.  Patient was instructed to return to ER immediately for any worsening or concerning symptoms.  All discharge instructions discussed with patient, and patient agrees to comply with discharge instructions given today.     Amount and/or Complexity of Data Reviewed  Radiology:  ordered.    Risk  Prescription drug management.                                      Clinical Impression:  Final diagnoses:  [W19.XXXA] Fall  [M25.561] Knee pain, right  [M79.604] Right leg pain (Primary)  [M25.561] Acute pain of right knee          ED Disposition Condition    Discharge Stable          ED Prescriptions       Medication Sig Dispense Start Date End Date Auth. Provider    oxyCODONE-acetaminophen (PERCOCET) 5-325 mg per tablet Take 1 tablet by mouth every 8 (eight) hours as needed for Pain. 12 tablet 6/26/2024 -- Sergio Grewal, HARRY          Follow-up Information    None          Sergio Grewal, HARRY  06/26/24 5477

## 2024-07-10 ENCOUNTER — CLINICAL SUPPORT (OUTPATIENT)
Dept: REHABILITATION | Facility: HOSPITAL | Age: 44
End: 2024-07-10
Payer: MEDICAID

## 2024-07-10 DIAGNOSIS — Z98.890 S/P MENISCECTOMY: Primary | ICD-10-CM

## 2024-07-10 PROCEDURE — 97110 THERAPEUTIC EXERCISES: CPT

## 2024-07-10 NOTE — PROGRESS NOTES
Physical Therapy Daily Note     Name: Sadaf Jameske  Clinic Number: [id number]  Diagnosis:   Encounter Diagnosis   Name Primary?    S/P meniscectomy Yes     Physician: Jimenez Sandoval MD  Precautions: none  Visit #: 1 of 12  PTA Visit #: 0  Time In: 1400  Time Out: 1500    Subjective     Pt reports: three falls since her surgery on May 23rd. She reported constant pain and fear avoidance behaviors since her surgery. She is walking well, but all functional mobility is reported as increasing her pain. Patient reports noncompliance with home exercises.   Pain Scale: Sadaf rates pain on a scale of 0-10 to be 7 currently.    Objective     Sadaf received individual therapeutic exercises to develop strength, endurance, ROM, flexibility, posture, and core stabilization for 45 minutes including:  SLR  SAQ  LAQ  Heel slides  Hamstring stretch      Sadaf received the following manual therapy techniques: Joint mobilizations, Manual traction, Myofacial release, Manual Lymphatic Drainage, Soft tissue Mobilization, and Friction Massage were applied to the: L knee for 15 minutes including:  STM of anterior and posterior L knee   PROM L knee  Manual stretching of hamstring    The patient received the following direct contact modalities after being cleared for contraindications: none    The patient received the following supervised modalities after being cleared for contradictions: none    Written Home Exercises Provided: exercises performed today, handout provided  Pt demo good understanding of the education provided. Sadaf demonstrated good return demonstration of activities.     Education provided re:  Sadaf verbalized good understanding of education provided.   No spiritual or educational barriers to learning provided         HOME EXERCISE PROGRAM  Created by Anibal Gómez  Jul 10th, 2024  View videos at [email or web address]        HAMSTRING STRETCH    While lying on your  back, raise up your leg and hold the back of your knee. Pull the leg upwards until a stretch is felt. Hold, relax and repeat.    Video # [id number] Repeat 5 Times   Hold 30 Seconds   Perform 2 Times a Day          STRAIGHT LEG RAISE 2 - SLR    While lying or sitting, raise up your leg with a straight knee. Keep both knees straight the entire time.    Video # [id number] Repeat 30 Times   Hold 5 Seconds   Perform 2 Times a Day          SHORT ARC QUAD - SAQ - KNEE EXTENSION    Place a ball or rolled up towel under your knee and slowly straighten your knee as you lift your foot. Lower back down and repeat.    Video # [id number] Repeat 30 Times   Hold 5 Seconds   Perform 2 Times a Day          heel slides    heel slides while supine Repeat 30 Times   Hold 2 Seconds   Perform 2 Times a Day          LONG ARC QUAD - LAQ - KNEE EXTENSION    Start in a seated position with your knee bent as shown, slowly straighten your knee as you raise your foot upwards as shown. Return to starting position and repeat.    Video # [id number] Repeat 30 Times   Hold 5 Seconds   Perform 2 Times a Day                  Assessment     Patient tolerated treatment well with overall decrease in symptoms. Patient presents with significant guarding of quads and hamstrings and fear avoidance behaviors with knee AROM and PROM. TTP at hamstring insertion, medial and lateral joint line of L knee, and patella tendon. Patient demonstrated good understanding of exercises and POC. Progress per patient tolerance.     This is a 43 y.o. female referred to outpatient physical therapy and presents with a medical diagnosis of s/p meniscectomy and demonstrates limitations as described in the problem list. Pt prognosis is Good. Pt will continue to benefit from skilled outpatient physical therapy to address the deficits listed in the problem list, provide pt/family education and to maximize pt's level of independence in the home and community environment.     Goals as  follows:  Short Term GOALS: 3 weeks. Pt agrees with goals set.  1. Patient demonstrates independence with HEP.   2. Patient demonstrates postural awareness with all activities.   3. Patient reports no falls during functional activities.      Long Term GOALS: 6  weeks. Pt agrees with goals set.  1. Patient demonstrates improvement in FOTO score to 52 or greater to demonstrate improvement in functional activity tolerance.   2. Patient demonstrates improvement in LE strength by 1/2 grade or greater.   3. Patient demonstrates (right) knee range of motion WNL.   4. Patient demonstrates decreased frequency, intensity and duration of pain symptoms.   5. Patient report improvement in standing tolerance to 30 minutes to perform daily activities.   6. Goals PRN.      Plan     Continue with established Plan of Care towards PT goals. Authorization through 8/12/2024    Therapist: Anibal Gómez, PT, DPT  7/10/2024

## 2024-07-11 ENCOUNTER — HOSPITAL ENCOUNTER (OUTPATIENT)
Dept: SLEEP MEDICINE | Facility: HOSPITAL | Age: 44
Discharge: HOME | End: 2024-07-11
Attending: OTOLARYNGOLOGY
Payer: MEDICAID

## 2024-07-11 DIAGNOSIS — G47.30 SLEEP APNEA, UNSPECIFIED TYPE: ICD-10-CM

## 2024-07-11 DIAGNOSIS — E88.810 METABOLIC SYNDROME: ICD-10-CM

## 2024-07-11 PROCEDURE — 95810 POLYSOM 6/> YRS 4/> PARAM: CPT

## 2024-07-12 ENCOUNTER — CLINICAL SUPPORT (OUTPATIENT)
Dept: REHABILITATION | Facility: HOSPITAL | Age: 44
End: 2024-07-12
Payer: MEDICAID

## 2024-07-12 DIAGNOSIS — Z98.890 S/P MENISCECTOMY: Primary | ICD-10-CM

## 2024-07-12 PROCEDURE — 97110 THERAPEUTIC EXERCISES: CPT

## 2024-07-12 NOTE — PROGRESS NOTES
Physical Therapy Daily Note     Name: Sadaf Dhaliwal  Clinic Number: [id number]  Diagnosis:   No diagnosis found.    Physician: Jimenez Sandoval MD  Precautions: none  Visit #: 2 of 12  PTA Visit #: 0  Time In: 1300  Time Out: 1400    Subjective     Pt reports: HEP compliance and decreased pain overall. Patient is more confident with knee bending and feels like she is already improving.     Pain Scale: Sadaf rates pain on a scale of 0-10 to be 5 currently.    Objective     Sadaf received individual therapeutic exercises to develop strength, endurance, ROM, flexibility, posture, and core stabilization for 45 minutes including:  SLR  SAQ  LAQ  Heel slides  Hamstring stretch  Prone quad stretch      Sadaf received the following manual therapy techniques: Joint mobilizations, Manual traction, Myofacial release, Manual Lymphatic Drainage, Soft tissue Mobilization, and Friction Massage were applied to the: L knee for 15 minutes including:  STM of anterior and posterior L knee   PROM L knee  Manual stretching of hamstring    The patient received the following direct contact modalities after being cleared for contraindications: none    The patient received the following supervised modalities after being cleared for contradictions: none    Written Home Exercises Provided: exercises performed today, handout provided  Pt demo good understanding of the education provided. Sadaf demonstrated good return demonstration of activities.     Education provided re:  Sadaf verbalized good understanding of education provided.   No spiritual or educational barriers to learning provided      Assessment     Patient tolerated treatment well with overall decrease in symptoms. Patient presents with much less guarding of quads and hamstrings and less fear avoidance behaviors with knee AROM and PROM. Patient demonstrated good understanding of exercises and POC. Progress per patient  tolerance. Overall symptom improvement with treatment and HEP.    This is a 43 y.o. female referred to outpatient physical therapy and presents with a medical diagnosis of s/p meniscectomy and demonstrates limitations as described in the problem list. Pt prognosis is Good. Pt will continue to benefit from skilled outpatient physical therapy to address the deficits listed in the problem list, provide pt/family education and to maximize pt's level of independence in the home and community environment.     Goals as follows:  Short Term GOALS: 3 weeks. Pt agrees with goals set.  1. Patient demonstrates independence with HEP.   2. Patient demonstrates postural awareness with all activities.   3. Patient reports no falls during functional activities.      Long Term GOALS: 6  weeks. Pt agrees with goals set.  1. Patient demonstrates improvement in FOTO score to 52 or greater to demonstrate improvement in functional activity tolerance.   2. Patient demonstrates improvement in LE strength by 1/2 grade or greater.   3. Patient demonstrates (right) knee range of motion WNL.   4. Patient demonstrates decreased frequency, intensity and duration of pain symptoms.   5. Patient report improvement in standing tolerance to 30 minutes to perform daily activities.   6. Goals PRN.      Plan     Continue with established Plan of Care towards PT goals. Authorization through 8/12/2024    Therapist: Anibal Gómez, PT, DPT  7/12/2024

## 2024-07-17 ENCOUNTER — HOSPITAL ENCOUNTER (OUTPATIENT)
Dept: SLEEP MEDICINE | Facility: HOSPITAL | Age: 44
Discharge: HOME | End: 2024-07-17
Attending: OTOLARYNGOLOGY
Payer: MEDICAID

## 2024-07-17 DIAGNOSIS — G47.00 INSOMNIA WITH SLEEP APNEA: ICD-10-CM

## 2024-07-17 DIAGNOSIS — G47.30 INSOMNIA WITH SLEEP APNEA: ICD-10-CM

## 2024-07-18 ENCOUNTER — CLINICAL SUPPORT (OUTPATIENT)
Dept: REHABILITATION | Facility: HOSPITAL | Age: 44
End: 2024-07-18
Payer: MEDICAID

## 2024-07-18 DIAGNOSIS — Z98.890 S/P MENISCECTOMY: Primary | ICD-10-CM

## 2024-07-18 DIAGNOSIS — S83.281D ACUTE LATERAL MENISCAL TEAR, RIGHT, SUBSEQUENT ENCOUNTER: ICD-10-CM

## 2024-07-18 DIAGNOSIS — E78.5 HYPERLIPIDEMIA, UNSPECIFIED HYPERLIPIDEMIA TYPE: ICD-10-CM

## 2024-07-18 PROCEDURE — 97110 THERAPEUTIC EXERCISES: CPT | Mod: CQ

## 2024-07-18 RX ORDER — ROSUVASTATIN CALCIUM 40 MG/1
TABLET, COATED ORAL
Qty: 30 TABLET | Refills: 5 | Status: SHIPPED | OUTPATIENT
Start: 2024-07-18

## 2024-07-18 NOTE — PROGRESS NOTES
Physical Therapy Daily Note     Name: Sadaf Dhaliwal  Clinic Number: [id number]  Diagnosis:   Encounter Diagnoses   Name Primary?    S/P meniscectomy Yes    Acute lateral meniscal tear, right, subsequent encounter        Physician: Jimenez Sandoval MD  Precautions: none  Visit #: 3 of 12  PTA Visit #: 1  Time In: 1500  Time Out: 1600  FOTO: 47    Subjective     Pt reports: Still compliant with HEP. Edema remains, ices as needed. Pt sad about a recent break up and not doing good mentally.       Pain Scale: Sadaf rates pain on a scale of 0-10 to be 3 currently.    Objective     Sadaf received individual therapeutic exercises to develop strength, endurance, ROM, flexibility, posture, and core stabilization for 45 minutes including:  Ice pack  with Quad sets   SLR with hip abduction   Heel slides  Side lying hip abduction with knee flexion   Prone quad stretch      Sadaf received the following manual therapy techniques: Joint mobilizations, Manual traction, Myofacial release, Manual Lymphatic Drainage, Soft tissue Mobilization, and Friction Massage were applied to the: L knee for 15 minutes including:  STM of anterior and posterior L knee   PROM L knee  Manual stretching of hamstring      Written Home Exercises Provided: exercises performed today   Pt demo good understanding of the education provided. Sadaf demonstrated good return demonstration of activities.     Education provided re: standing more at home, being active    Sadaf verbalized good understanding of education provided.   No spiritual or educational barriers to learning provided      Assessment     Patient demonstrated understanding of exercises and POC. Discussion of standing more at home and walking. Pt was pleased with the session. Some edema on lateral knee at quad insertion.  Overall symptom improvement with treatment and HEP.    This is a 43 y.o. female referred to outpatient physical therapy  and presents with a medical diagnosis of s/p meniscectomy and demonstrates limitations as described in the problem list. Pt prognosis is Good. Pt will continue to benefit from skilled outpatient physical therapy to address the deficits listed in the problem list, provide pt/family education and to maximize pt's level of independence in the home and community environment.     Goals as follows:  Short Term GOALS: 3 weeks. Pt agrees with goals set.  1. Patient demonstrates independence with HEP.   2. Patient demonstrates postural awareness with all activities.   3. Patient reports no falls during functional activities.      Long Term GOALS: 6  weeks. Pt agrees with goals set.  1. Patient demonstrates improvement in FOTO score to 52 or greater to demonstrate improvement in functional activity tolerance.   2. Patient demonstrates improvement in LE strength by 1/2 grade or greater.   3. Patient demonstrates (right) knee range of motion WNL.   4. Patient demonstrates decreased frequency, intensity and duration of pain symptoms.   5. Patient report improvement in standing tolerance to 30 minutes to perform daily activities.   6. Goals PRN.      Plan     Continue with established Plan of Care towards PT goals. Authorization through 8/12/2024    Therapist: Izzy Barron PTA, CI, MS  7/18/2024

## 2024-07-19 ENCOUNTER — OFFICE VISIT (OUTPATIENT)
Dept: OTOLARYNGOLOGY | Facility: CLINIC | Age: 44
End: 2024-07-19
Payer: MEDICAID

## 2024-07-19 VITALS
DIASTOLIC BLOOD PRESSURE: 82 MMHG | BODY MASS INDEX: 37.41 KG/M2 | WEIGHT: 190.56 LBS | SYSTOLIC BLOOD PRESSURE: 115 MMHG | HEIGHT: 60 IN | HEART RATE: 88 BPM

## 2024-07-19 DIAGNOSIS — H68.023 CHRONIC EUSTACHIAN SALPINGITIS, BILATERAL: ICD-10-CM

## 2024-07-19 DIAGNOSIS — G47.30 SLEEP APNEA, UNSPECIFIED TYPE: ICD-10-CM

## 2024-07-19 DIAGNOSIS — H61.22 HEARING LOSS DUE TO CERUMEN IMPACTION, LEFT: ICD-10-CM

## 2024-07-19 DIAGNOSIS — F17.200 SMOKING: Primary | ICD-10-CM

## 2024-07-19 DIAGNOSIS — Z98.890 HISTORY OF MYRINGOTOMY: ICD-10-CM

## 2024-07-19 DIAGNOSIS — J34.2 DEVIATED SEPTUM: ICD-10-CM

## 2024-07-19 PROCEDURE — 4010F ACE/ARB THERAPY RXD/TAKEN: CPT | Mod: CPTII,,, | Performed by: OTOLARYNGOLOGY

## 2024-07-19 PROCEDURE — 99999 PR PBB SHADOW E&M-EST. PATIENT-LVL III: CPT | Mod: PBBFAC,,, | Performed by: OTOLARYNGOLOGY

## 2024-07-19 PROCEDURE — 69210 REMOVE IMPACTED EAR WAX UNI: CPT | Mod: 51,S$PBB,, | Performed by: OTOLARYNGOLOGY

## 2024-07-19 PROCEDURE — 3079F DIAST BP 80-89 MM HG: CPT | Mod: CPTII,,, | Performed by: OTOLARYNGOLOGY

## 2024-07-19 PROCEDURE — 69210 REMOVE IMPACTED EAR WAX UNI: CPT | Mod: 51,PBBFAC,PN | Performed by: OTOLARYNGOLOGY

## 2024-07-19 PROCEDURE — 99213 OFFICE O/P EST LOW 20 MIN: CPT | Mod: PBBFAC,PN,25 | Performed by: OTOLARYNGOLOGY

## 2024-07-19 PROCEDURE — 99214 OFFICE O/P EST MOD 30 MIN: CPT | Mod: 25,S$PBB,, | Performed by: OTOLARYNGOLOGY

## 2024-07-19 PROCEDURE — 3008F BODY MASS INDEX DOCD: CPT | Mod: CPTII,,, | Performed by: OTOLARYNGOLOGY

## 2024-07-19 PROCEDURE — 3051F HG A1C>EQUAL 7.0%<8.0%: CPT | Mod: CPTII,,, | Performed by: OTOLARYNGOLOGY

## 2024-07-19 PROCEDURE — 31575 DIAGNOSTIC LARYNGOSCOPY: CPT | Mod: PBBFAC,PN | Performed by: OTOLARYNGOLOGY

## 2024-07-19 PROCEDURE — 3074F SYST BP LT 130 MM HG: CPT | Mod: CPTII,,, | Performed by: OTOLARYNGOLOGY

## 2024-07-19 NOTE — PROCEDURES
"Laryngoscopy    Date/Time: 7/19/2024 3:00 PM    Performed by: Emmanuel Montejo MD  Authorized by: Emmanuel Montejo MD    Time out: Immediately prior to procedure a "time out" was called to verify the correct patient, procedure, equipment, support staff and site/side marked as required.    Consent Done?:  Yes (Verbal)  Anesthesia:     Local anesthetic:  Other    Patient tolerance:  Patient tolerated the procedure well with no immediate complications    Decongestion performed?: Yes    Laryngoscopy:     Areas examined:  Nasal cavities, nasopharynx, oropharynx, hypopharynx, larynx and vocal cords  Larynx/hypopharynx:      Topical Afrin and lidocaine aerosolized both nares.  The slimline video Storz endoscope was used through both nares evaluating inferior meatus middle meatus and sphenoethmoid recesses as well as the nasopharynx oropharynx hypopharynx and larynx through the slightly more patent left nares.      Some residual adenoid tissue.  Some mucoid drainage from both Eustachian tube orifices.      Mild base of tongue lingual tonsil hypertrophy no suspicious lesion pooling or asymmetry.  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  The respiratory larynx is widely open.  No suspicious mucosal changes of erythroplakia, leukoplakia, or ulceration.    "

## 2024-07-19 NOTE — PATIENT INSTRUCTIONS
Cessation as discussed.  We will avoid using Wellbutrin given the history of becoming angry with previous use.  We discussed the possibility of using Chantix but this often has significant psychiatric side effects as well.      Patient is to work down from 20 cigarettes a day by decreasing by 1 cigarette every 2 days to quit in the next 6 weeks.  This is to be accomplished by putting 1 less cigarette into a Ziploc pouch every other day as per a calendar.  Patient encouraged to share her plans with friends and family and to save the 5 dollars a day into a jar so that in 1 year with nonsmoking she will have a large amount of money to spend on a vacation or other pleasure.      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  No suspicious findings otherwise noted but this does need to be monitored.      Hearing testing needs to be completed as previously ordered.  Erma Brandon's number provided for the patient to contact if not scheduled.  Patient will follow up in 2 months.  She is to continue with ear care nasal and sinus care and smoking cessation as above and below.      Voice recognition software was used in the creation of this note/communication and any sound-alike errors which may have occurred from its use should be taken in context when interpreting.  If such errors prevent a clear understanding of the note/communication, please contact the office for clarification.    SNORING INSTRUCTIONS    Typically snoring is worse in the supine (on back) position.  Avoiding the supine position with measures discussed such as a T-shirt on backwards with a tennis ball in the pocket as a trial or specially designed shirts to keep the patient off the back including even sewn packet over the spine with Styrofoam balls maybe effective in beneficial.      Tongue distraction/mandibular advancement with an oral appliance is quite often helpful with snoring if well tolerated.  The SnoreRx is one  "such over-the-counter appliance which has good readings online.  Alternatively a custom-made snoring appliance can be considered with a qualified dental professional.  In either case any oral devices should be discussed with dentistry to make sure there is no negative impact on temporomandibular joint function or dental occlusion/alignment.    Any residual nasal congestive symptoms not improve by saline and nasal steroids may benefit from external nasal widening devices such as Breathe Rite strips or nasal valve lifting internal devices such as "Air Max" or "Sinus Cones" all of which are available online.  Improved nasal breathing may decrease resistance thereby reducing vibration and snoring.      Maintaining ideal body weight is often quite effective and consider the first-line treatment in sleep disturbance including snoring.  As the body ages snoring tends to worsen even if body mass index is maintained, possibly due to decreased muscle tone, loss of collagen and last in in connective tissues etc..  Weight gain results in increased effort of breathing and collapse of the airway as well as increased fat in the tongue/lingual fat pads which results in greater narrowing of the airway and potential obstruction.    If conservative care of both nasal, dental occlusion issues, weight, bed positioning and tongue palate positioning are not improving symptoms, more invasive approaches may be appropriate to consider. Surgical treatment of snoring can include palatal stiffening with radiofrequency devices, implants, or injection sclerotherapy of the palate.  Other reductive procedures to the tongue for hypo pharyngeal snoring including radiofrequency may be appropriate if palatal snoring procedures are not effective.  These procedures are not typically covered by insurance as they are considered cosmetic.        ROUTINE EAR CARE    Keep the ears dry in general.  Water and soap dry the ears increases scaling by stripping " away the natural oils of the ears.    A twisted single ply of facial tissue can be used to wick out moisture on the rare occasion when water gets stuck in the ear; or the water can be displaced with concentrated alcohol like OTC SwimEar or a few drops of 72-95% isopropyl alcohol to fill the ear canal.  Regular use will cause excess drying of the ears.    The ear should be kept moist in general with mineral oil. Three to four drops into the ear canal 2 to 3 times a week or even every night.    If the ears need to be irrigated use either a 50 50 mixture of white vinegar and distilled water or 50 50 mixture of alcohol and white vinegar.    Painful draining ears that do not resolve with conservative care could represent infection and may need microscopic office debridement/clearing of the wax, and topical antibiotic drops with or without steroids may need to be prescribed.      NASAL SALINE    Still saline comes in many preparations including sprays/mists, gels, and rinses.  Different preparations served different purposes.  Saline spray helps to briefly moisturize the nose and help clear mucus.  Saline gels coat the nose for longer protective benefit of keeping the linings the nose moist.  Saline rinses clear the nose and sinuses and a more thorough way in her best used for significant postnasal drip and sinus complaints.  A combination of saline sprays/mists, gels and rinses should be used to address routine nasal clearing and dryness issues as well as flushing for better control of allergy and postnasal drip symptoms.  There is no real risk of over use of nasal saline products.  Saline sprays do not have any of the potential rebound or addiction of nasal decongestant sprays.  Nasal saline sprays and rinses should be used prior to the application of any medicated nasal sprays such as nasal steroids or nasal antihistamine sprays.        INTRANASAL STEROID SPRAYS      Intranasal steroid sprays are available both by  prescription and over-the-counter both in generic and brand name preparations.  They are all very similar in efficacy and side effect profiles.  Over-the-counter and prescription intranasal steroids include fluticasone propionate (Flonase), fluticasone furoate (Sensimist), triamcinolone (Nasacort), and budesonide (Rhinocort).  While these medications are available as prescriptions as well there are few nasal steroids in her available by prescription only and include mometasone (Nasonex), flunisolide (Nasarel), and beclomethasone (QNASL).    Nasal steroids or the foundation of treatment of both allergy and other inflammatory conditions of the nose and sinuses.  They are safe for regular use and while there are many side effects listed most of these are steroid class effects and not typically encountered.  Typical side effects include dryness and even ulceration and bleeding of the nose.  These side effects can be minimized by proper application and proper moisturization with saline and saline gel.    Sometimes changing between 1 brand of nasal steroid and another can result in improved control of symptoms especially after long term use of one particular nasal steroid.    Proper application of the nasal steroid spray is accomplished by spraying towards the I/ear on the same side with the tip of the superior just barely inside the nostril with the chin slightly downward.  Any dripping should be gently inhaled not sniff test backwards into the throat.  Labeled instructions should be followed.        ASTELIN (Azelastine) nasal spray    Astelin is a topical nasal antihistamine which can be of additional benefit in controlling nasal allergy and postnasal drip.  Typically is recommended on an as-needed basis 1-2 sprays each nostril twice daily.  People often find it beneficial at night.  This typically added to her regimen of saline and nasal steroids as a 3rd line agent for as needed use.  Excessive use can cause excessive  dryness and even nose bleeds.  It has a very strong taste which many people find intolerable.  Astelin needs to be stopped 5-7 days prior to any skin allergy testing just like oral antihistamines as it will inhibit the skin response.      SINUS RINSE INSTRUCTIONS    Nasal Saline Irrigation Instructions  You can wash your nasal and sinus passages using nasal saline (salt water) irrigation. This   is simple and effective. Follow the instructions below, as well as the ones provided by your   physician.  Supplies  First, you will need a nasal saline irrigation bottle and rinse solution.   You can purchase nasal rinse kits that include these items (such as   NeilMed®, Ayr®, Simply Saline®, Ocean Complete®) at most drug   stores. You can also make your own saline irrigation solution by   adding kosher (non-iodine) salt and baking soda to distilled water.   Your physician may tell you to add medications like a steroid or   antibiotic to the rinse as needed.  Steps for nasal irrigation  Step 1. Fill the bottle  ? Wash your hands.  ? Fill the irrigation bottle with lukewarm distilled water or boiled water that has cooled.  Step 2. Mix the solution  ? Put the saline and salt packet contents into the bottle.  ? Tighten the top of the bottle and shake it gently to dissolve the mixture.  ? If you are making your own solution:   - Add 1/4 to 1/2 teaspoon of baking soda and 1/8 teaspoon of kosher (non-iodine) salt   into the bottle.   - Tighten the top of the bottle.   - Shake the bottle gently to dissolve the mixture.  Step 3. Get into position  ?  front of the sink.  ? Unless you were instructed to use another position, bend forward.   Then tilt your face down about 45 degrees so that you are looking   down into the sink.  ? Gently place the spout of the saline bottle against 1 of your nostrils.  [facility]  CARE AND TREATMENT  Patient Education  ©2018 [employer]  ©2018 [employer]  Step 4. Rinse  ? Breathing through your mouth, gently squeeze the   bottle. This will squirt the solution into your nostril. The   solution will start to drain from the other nostril. Some   may drain from your mouth. This is normal.  ? Use 2 ounces (half of the bottle) on each nostril.  ? Afterwards, you may need to blow your nose gently to   help drain any solution that is left behind.  Step 5. Repeat  ? Repeat steps 3 and 4 with the other nostril.  You can watch a video to learn how to do nasal saline irrigation. Go to [employer] and   search for NeilMed Sinus Rinse.  Step 6.  Clean the bottle and cap. Air dry the Sinus Rinse bottle, cap, and tube on a clean paper towel, a lint free towel, or use NeilMed® NasaDOCK® or NasaDOCK plus (sold separately) to store the bottle, cap and tube.  Please read Warnings before using.  Our recommendation is to replace the bottle every three months.      NECLEVE GREEN INSTRUCTIONS    It is very important to keep these devices clean and free from any contamination. Replace the bottle every 3 months.  NasaDock Plus  NasaDock NeilMed® SINUS RINSE Squeeze Bottle: Please perform routine inspections of the bottle and tube for any discolorations and cracks. If there are any visual signs of deterioration or permanent color changes, please clean thoroughly. If the discolorations remain after cleansing, discard the items and purchase new ones. Please follow these instructions after each use of the product. Be sure to replace your product after three months.  Step 1: Rinse the cap, tube and bottle using running water. Fill the bottle with distilled, micro-filtered (through 0.2 micron), reverse osmosis filtered, commercially bottled or previously boiled and cooled down water at lukewarm or body temperature..  Step 2: Add a few drops of dish washing liquid or baby shampoo.  Step 3: Attach the cap and tube to the bottle; hold your finger over the opening in the  cap and shake the bottle vigorously.  Step 4: Squeeze the bottle hard to allow the soapy solution to clean the interior of the tube and the cap. Empty out the bottle completely.  Step 5: Rinse the soap from the bottle, cap and tube thoroughly and place the items on a clean paper towel to dry or use the preferred NasaDOCK® or NasaDOCK plus.    The NasaDOCK® is a simple, hygienic way to dry and store the SINUS RINSE bottle, cap and tube. NasaDOCK® comes with various hanging options and is available in different colors. Our newest model also offers storage for our SINUS RINSE mixture packets. We strongly suggest using NasaDOCK® as an inexpensive, easy way to dry the cap, tube and SINUS RINSE bottle.        Cleaning:  Do not use a  to clean the inside of a bottle. While our bottle is  safe, a  will not adequately clean the SINUS RINSE bottle. The water jets in dishwashers cannot enter the narrow neck of the bottle, and portions of the bottles interior will not be cleaned thoroughly. Additional methods of cleaning the bottle include the use of concentrated white vinegar or isopropyl alcohol (70% concentration), followed by scrubbing and rinsing as described above.       Microwave Disinfection  Clean the device with soap and water as mentioned above and shake off the excess water. Now place the bottle, cap and tube in the microwave for 40 seconds. This will disinfect the bottle, cap and tube. If the microwave has been used recently, please make sure that the inside of the microwave has cooled back down to room temperature before using it to disinfect the bottle.    NeilMed NasaFlo® Neti Pot Users:  Use the same procedure as above.    Sinugator® Cleaning Directions:  Clean the Sinugator® by running plain water and dry with a clean lint free towel and then air dry the unit by keeping it open to the air. The nasal  tip, blue reservoir and white soft tube can be  disinfected by cleaning with soap and water and shaking off the excess water before placing in the home microwave for 60 seconds. Clean the entire unit with a few drops of dishwashing liquid and water every three days to keep the unit clean. As a fi nal rinse to wash off any residual soap or tap water, use either distilled, micro-filtered (through 0.2 micron filter), commercially bottled or previously boiled & cooled down water. Please make sure to rinse thoroughly during each wash so no soap is left behind. DO NOT place the white motor unit in microwave for disinfection. Because of the units stainless steel components, this can cause damage or fire hazards.    General Principles of Maintenance & Storage:  When permissible use a microwave periodically to disinfect devices. Always store NeilMed® products in a cool and dry place with adequate ventilation. NasaDOCK® or NasaDOCK plus offer a simple hygienic way to air dry & neatly store the bottle, cap, tube and NasaFlo. Do not store the bottle with the cap screwed on, unless both are dry. Do not store the wet parts in a sealed plastic bag. If you travel before they are dry, wrap parts separately in paper towels. Hand soap or shampoo can be used for cleaning parts while away from home.        USE ONLY AS DIRECTED, IF SYMPTOMS PERSIST SEE YOUR DOCTOR/HEALTHCARE PROFESSIONAL. ALWAYS READ THE LABEL.

## 2024-07-19 NOTE — PROCEDURES
EAR DEBRIDEMENT / CERUMEN DISIMPACTION, 80379     Indication: Excessive cerumen with limited view of TM's    Side: Left    Findings: wax into the anterior angle covering/contacting anterior TM    Procedure: Ear canal(s) cleared completely using alligator with microscopy.  Wax was not hydrolyzed with peroxide.  Topical medication was not applied.    Complication: none

## 2024-07-19 NOTE — PROGRESS NOTES
[facility]    Subjective:      Patient: Sadaf Dhaliwal Patient PCP: Good Conway,          :  1980     Sex:  female      MRN:  [id number]          Date of Visit: 2024      Chief Complaint: Otitis Media (Left ear)      Patient ID: Sadaf Dhaliwal is a 43 y.o. female     2024 established patient visit routine follow up with multiple ENT issues.  Longstanding history of Eustachian tube dysfunction with repeated sets of tubes in childhood with findings of S deviation of the septum ongoing sinus complaints and congestion with retraction of the tympanic membranes down to the IS joint without adhesion appreciated and some wisps of fluid.  Significant sleep disturbance.      Sleep study, hearing testing, nasal steroids, smoking cessation with Zyban implants for nasal endoscopy/laryngoscopy on return.      No audiogram.  Did not take Zyban as she had issues with bupropion in the past making her angry breaking or glasses and punching walls.    Sleep study with an AHI of 3.1 primarily due to hypopneas not notably elevated in REM sleep.  Consistent with upper airway resistant syndrome with loud snoring and mild the oxygenation with scott oxygen of 88% was 0 minutes below 88%.  A VHI was consistent with mild obstructive sleep apnea at 7 in the supine position.      05/10/2024 initial patient consultation Patient is a  greater than 20 pack year current smoker with a past medical history of immunosuppression with Imuran Humira for Crohn's disease, hypothyroidism, type 2 diabetes, migraine on Ubrelvy and Elavil, HTN, HLD referred to me by Dr. Good Conway in consultation for right-greater-than-left quit otitis media nasal congestion cough.    No audiogram.  No head/ear imaging.    Patient reports dramatic snoring and witnessed apneas with daytime fatigue.  Comorbidities has a above.      Multiple sets of tubes (reported 6 times) in childhood none in recent ears.    Prior nasal  trauma nasal reconstructive surgery with chronic nasal obstruction.      We will up to quit smoking has had no benefit from prior nicotine supplementation/replacement therapy.    Labs:  WBC   Date Value Ref Range Status   2024 8.37 3.90 - 12.70 K/uL Final     Hemoglobin   Date Value Ref Range Status   2024 14.4 12.0 - 16.0 g/dL Final     Platelets   Date Value Ref Range Status   2024 231 150 - 450 K/uL Final     Creatinine   Date Value Ref Range Status   2024 1.0 0.5 - 1.4 mg/dL Final     TSH   Date Value Ref Range Status   2024 2.260 0.400 - 4.000 uIU/mL Final     Comment:     ATTENTION: The use of Biotin Supplements may interfere with this   assay.       Hemoglobin A1C   Date Value Ref Range Status   2024 7.3 (H) 4.0 - 5.6 % Final     Comment:     ADA Screening Guidelines:  5.7-6.4%  Consistent with prediabetes  >or=6.5%  Consistent with diabetes    High levels of fetal hemoglobin interfere with the HbA1C  assay. Heterozygous hemoglobin variants (HbS, HgC, etc)do  not significantly interfere with this assay.   However, presence of multiple variants may affect accuracy.         Past Medical History  She has a past medical history of ADHD (attention deficit hyperactivity disorder), Allergies, Anxiety, Arthritis, Autism, Bipolar disorder, Chronic nausea, Crohn's colitis, Depression, Heart murmur, High cholesterol, History of rectal surgery, Hypertension, Hypothyroidism, Neuropathy, and Pneumonia due to COVID-19 virus.    Family / Surgical / Social History  Her family history includes Breast cancer in her mother; Lung cancer in her mother; No Known Problems in her daughter, father, maternal aunt, maternal grandfather, maternal grandmother, maternal uncle, paternal aunt, paternal grandfather, paternal grandmother, paternal uncle, and sister; Ovarian cancer in her mother.    Past Surgical History:   Procedure Laterality Date    APPENDECTOMY       SECTION      CHOLECYSTECTOMY       COLONOSCOPY N/A 4/29/2020    Procedure: COLONOSCOPY;  Surgeon: Brittnee Dueñas MD;  Location: [facility];  Service: Endoscopy;  Laterality: N/A;    COLONOSCOPY N/A 1/5/2021    Procedure: COLONOSCOPY;  Surgeon: Brittnee Dueñas MD;  Location: [facility];  Service: Endoscopy;  Laterality: N/A;  needs rapid COVID    KNEE ARTHROSCOPY W/ MENISCECTOMY Right 5/23/2024    Procedure: ARTHROSCOPY, KNEE, WITH MENISCECTOMY;  Surgeon: Jimenez Sandoval MD;  Location: [facility];  Service: Orthopedics;  Laterality: Right;    NASAL FRACTURE SURGERY      tubes in ears 6 times         Social History     Tobacco Use    Smoking status: Every Day     Current packs/day: 1.00     Average packs/day: 1 pack/day for 23.9 years (23.9 ttl pk-yrs)     Types: Cigarettes     Start date: 8/16/2000     Passive exposure: Never    Smokeless tobacco: Never   Substance and Sexual Activity    Alcohol use: Never    Drug use: Not Currently     Types: Marijuana    Sexual activity: Not Currently     Partners: Female       Medications  She has a current medication list which includes the following prescription(s): humira pen [id number]-[id number] start, amitriptyline, azathioprine, hydrochlorothiazide, levocetirizine, levothyroxine, losartan, meloxicam, metformin, rosuvastatin, ubrelvy, and valacyclovir, and the following Facility-Administered Medications: mupirocin.      Allergies  Review of patient's allergies indicates:   Allergen Reactions    Clindamycin Swelling     Tongue swellong    Iodine and iodide containing products Nausea And Vomiting    Aspirin      Pt reports vomiting blood after taking.    Dayhist allergy [clemastine]      Other reaction(s): numbness to head    Flu vac 2023 [id number](pf)      Other Reaction(s): nausea and vomiting    Wellbutrin [bupropion hcl]      Makes angry       All medications, allergies, and past history have been reviewed.    Objective:      Vitals:      6/26/2024     4:49 PM 6/26/2024     4:51 PM 7/19/2024      3:06 PM   Vitals - 1 value per visit   SYSTOLIC  133 115   DIASTOLIC  82 82   Pulse  93 88   Temp  100 °F (37.8 °C)    Resp  18    SPO2  95 %    Weight (lb) 197  190.59   Weight (kg) 89.359  86.45   Height 5' (1.524 m)  5' (1.524 m)   BMI (Calculated) 38.5  37.2   Pain Score   Zero       Body surface area is 1.91 meters squared.    Physical Exam:    GENERAL  APPEARANCE -  alert, appears stated age, and cooperative  BARRIER(S) TO COMMUNICATION -  none VOICE - smokers VOICE     INTEGUMENTARY  no suspicious head and neck lesions    HEENT  HEAD: Normocephalic, without obvious abnormality, atraumatic  FACE: INSPECTION - Symmetric, no signs of trauma, no suspicious lesion(s)      STRENGTH - facial symmetry intact     PALPATION -  No masses     SALIVARY GLANDS - non-tender with no appreciable mass    NECK/THYROID: normal atraumatic, no neck masses, normal thyroid, no jvd    EYES  Normal occular alignment and mobility with no visible nystagmus at rest    EARS/NOSE/MOUTH/THROAT  EARS  PINNAE AND EXTERNAL EARS - no suspicious lesion OTOSCOPIC EXAM (surgical microscopy was used for visualization/instrumentation): EAR EXAM - both ears with retraction but no appreciable cholesteatoma.  Left ear has a slight yellow quality in the posterior aspect which may indicate effusion.  Wax cleared from the anterior angle and off the tympanic membrane on the left side..  HEARING - diminished but intact    NOSE AND SINUSES  EXTERNAL NOSE - flat broad scarred nasal dorsum  SEPTUM - S deformity with bilateral right-greater-than-left obstruction TURBINATES - edematous congested 2-3 + MUCOSA - edematous congested pink with moderately thick mucus without purulence     MOUTH AND THROAT   ORAL CAVITY, LIPS, TEETH, GUMS & TONGUE - Ribera 3b  OROPHARYNX /TONSILS/PHARYNGEAL WALLS/HYPOPHARYNX - no erythema or exudates  NASOPHARYNX - limited mirror exam - unable to visualize due to anatomy/gag  LARYNX -  - limited mirror exam - unable to visualize due  to anatomy/gag      CHEST AND LUNG   INSPECTION & AUSCULTATION - normal effort, no stridor    CARDIOVASCULAR  AUSCULTATION & PERIPHERAL VASCULAR - regular rate and rhythm.    NEUROLOGIC  MENTAL STATUS - alert, interactive CRANIAL NERVES - normal    LYMPHATIC  HEAD AND NECK - non-palpable; SUPRACLAVICULAR - deferred      Procedure(s):  None          Assessment:      Problem List Items Addressed This Visit    None  Visit Diagnoses       Smoking    -  Primary    Chronic eustachian salpingitis, bilateral        History of myringotomy        Deviated septum        Sleep apnea, unspecified type                       Plan:      Cessation as discussed.  We will avoid using Wellbutrin given the history of becoming angry with previous use.  We discussed the possibility of using Chantix but this often has significant psychiatric side effects as well.      Patient is to work down from 20 cigarettes a day by decreasing by 1 cigarette every 2 days to quit in the next 6 weeks.  This is to be accomplished by putting 1 less cigarette into a Ziploc pouch every other day as per a calendar.  Patient encouraged to share her plans with friends and family and to save the 5 dollars a day into a jar so that in 1 year with nonsmoking she will have a large amount of money to spend on a vacation or other pleasure.      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  No suspicious findings otherwise noted but this does need to be monitored.      Hearing testing needs to be completed as previously ordered.  Erma Brandon's number provided for the patient to contact if not scheduled.  Patient will follow up in 2 months.  She is to continue with ear care nasal and sinus care and smoking cessation as above and below.          Voice recognition software was used in the creation of this note/communication and any sound-alike errors which may have occurred from its use should be taken in context when interpreting.   If such errors prevent a clear understanding of the note/communication, please contact the office for clarification.

## 2024-07-22 ENCOUNTER — HOSPITAL ENCOUNTER (OUTPATIENT)
Dept: RADIOLOGY | Facility: HOSPITAL | Age: 44
Discharge: HOME | End: 2024-07-22
Attending: STUDENT IN AN ORGANIZED HEALTH CARE EDUCATION/TRAINING PROGRAM
Payer: MEDICAID

## 2024-07-22 VITALS — WEIGHT: 190 LBS | BODY MASS INDEX: 37.3 KG/M2 | HEIGHT: 60 IN

## 2024-07-22 DIAGNOSIS — Z12.31 ENCOUNTER FOR SCREENING MAMMOGRAM FOR BREAST CANCER: ICD-10-CM

## 2024-07-22 PROCEDURE — 77063 BREAST TOMOSYNTHESIS BI: CPT | Mod: TC

## 2024-07-22 PROCEDURE — 77067 SCR MAMMO BI INCL CAD: CPT | Mod: TC

## 2024-07-24 ENCOUNTER — DOCUMENTATION ONLY (OUTPATIENT)
Dept: REHABILITATION | Facility: HOSPITAL | Age: 44
End: 2024-07-24

## 2024-07-24 ENCOUNTER — PATIENT MESSAGE (OUTPATIENT)
Dept: ADMINISTRATIVE | Facility: HOSPITAL | Age: 44
End: 2024-07-24
Payer: MEDICAID

## 2024-07-24 ENCOUNTER — CLINICAL SUPPORT (OUTPATIENT)
Dept: REHABILITATION | Facility: HOSPITAL | Age: 44
End: 2024-07-24
Payer: MEDICAID

## 2024-07-24 DIAGNOSIS — Z98.890 S/P MENISCECTOMY: Primary | ICD-10-CM

## 2024-07-24 DIAGNOSIS — M25.561 ACUTE PAIN OF RIGHT KNEE: ICD-10-CM

## 2024-07-24 PROCEDURE — 97110 THERAPEUTIC EXERCISES: CPT

## 2024-07-24 NOTE — PROGRESS NOTES
Physical Therapy Daily Note/Physical Therapy Re-assessment     Name: Sadaf Jameske  Clinic Number: [id number]  Diagnosis:   Encounter Diagnoses   Name Primary?    S/P meniscectomy Yes    Acute pain of right knee        Physician: Jimenez Sandoval MD  Precautions: none  Visit #: 4 of 12  PTA Visit #: 0  Time In: 1302  Time Out: 1410  FOTO: 47 (7/24/2024)    Subjective     Pt reports: continues (right) quad and hamstring  spasms. Patient reports compliance with home exercise program. Patient concerned she is doing too much activity but patient encouraged to continue with activity.     Pain Scale: Sadaf rates pain on a scale of 0-10 to be 6 currently.    Objective     Sadaf received individual therapeutic exercises to develop strength, endurance, ROM, flexibility, posture, and core stabilization for 48 minutes including:  3x10 QS  3x10 SLR  3x10 heel slides with end range holds  3x10 SAQ  3x10 AP    IFC to (right) knee x15 minutes with patient having static knee extension stretching during IFC.     Sadaf received the following manual therapy techniques: Joint mobilizations, Manual traction, Soft tissue Mobilization, were applied to the: L knee for 20 minutes including:  IASTM of anterior and posterior L knee   PROM L knee flexion/extension  Manual stretching of hamstring      Written Home Exercises Provided: exercises performed today   Pt demo good understanding of the education provided. Sadaf demonstrated good return demonstration of activities.     Education provided re: standing more at home, being active    Sadaf verbalized good understanding of education provided.   No spiritual or educational barriers to learning provided    Range of Motion: Knee    Left Right   Flexion:  (115)   Extension WFL Lacking 5      Strength: Knee    Left Right   Quadriceps 4+/5 3/5   Hamstrings 4+/5 3/5         Girth    Left Right   Joint Line 38 cm 39.5 cm     FOTO 46 see media for complete  details    Assessment     Patient reported decreased pain symptoms following treatment. Patient encouraged to continue with home exercise program. Patient with decrease in FOTO score but does report ability to perform more functional activity at home but continues with discomfort. Patient with significant improvement in (right) knee range of motion from evaluation. Physical therapist feels patient could benefit from extension of current POC. Continue to progress with current POC.     This is a 43 y.o. female referred to outpatient physical therapy and presents with a medical diagnosis of s/p meniscectomy and demonstrates limitations as described in the problem list. Pt prognosis is Good. Pt will continue to benefit from skilled outpatient physical therapy to address the deficits listed in the problem list, provide pt/family education and to maximize pt's level of independence in the home and community environment.     Goals as follows:  Short Term GOALS: 3 weeks. Pt agrees with goals set.  1. Patient demonstrates independence with HEP. Met CB 7/24/2024  2. Patient demonstrates postural awareness with all activities. Progress CB 7/24/2024  3. Patient reports no falls during functional activities. Progress CB 7/24/2024     Long Term GOALS: 6  weeks. Pt agrees with goals set.  1. Patient demonstrates improvement in FOTO score to 52 or greater to demonstrate improvement in functional activity tolerance. Progress CB 7/24/2024  2. Patient demonstrates improvement in LE strength by 1/2 grade or greater. Progress CB 7/24/2024  3. Patient demonstrates (right) knee range of motion WNL. Progress CB 7/24/2024  4. Patient demonstrates decreased frequency, intensity and duration of pain symptoms. Progress CB 7/24/2024  5. Patient report improvement in standing tolerance to 30 minutes to perform daily activities. Progress CB 7/24/2024  6. Goals PRN.    Plan     Continue with established Plan of Care towards PT goals.     Current:  Outpatient physical therapy 2 times weekly to include: pt ed, hep, therapeutic exercises, neuromuscular re-education/ balance exercises,Manual therapy and modalities prn. Cont PT for  6 weeks. Pt may be seen by PTA as part of the rehabilitation team.   Certification dates: 6/20/2024-8/2/2024    New: Outpatient physical therapy 2 times weekly to include: pt ed, hep, therapeutic exercises, neuromuscular re-education/ balance exercises,Manual therapy and modalities prn including but not limited to electrical stimulation, MHP, CP, MFD,. Cont PT for  6 weeks. Pt may be seen by PTA as part of the rehabilitation team.   Certification dates: 8/3/2024-9/13/2024    Therapist: Carmelita Jimenez, PT, 7/24/2024

## 2024-07-26 ENCOUNTER — CLINICAL SUPPORT (OUTPATIENT)
Dept: REHABILITATION | Facility: HOSPITAL | Age: 44
End: 2024-07-26
Payer: MEDICAID

## 2024-07-26 ENCOUNTER — TELEPHONE (OUTPATIENT)
Dept: OTOLARYNGOLOGY | Facility: CLINIC | Age: 44
End: 2024-07-26
Payer: MEDICAID

## 2024-07-26 DIAGNOSIS — Z98.890 S/P MENISCECTOMY: Primary | ICD-10-CM

## 2024-07-26 PROCEDURE — 97110 THERAPEUTIC EXERCISES: CPT | Mod: CQ

## 2024-07-26 NOTE — PROGRESS NOTES
Physical Therapy Daily Note     Name: Sadaf Dhaliwal  Clinic Number: [id number]  Diagnosis:   Encounter Diagnosis   Name Primary?    S/P meniscectomy Yes       Physician: Jimenez Sandoval MD  Precautions: none  Visit #: 5 of 12  PTA Visit #: 1  Time In: 1302  Time Out: 1400  FOTO: 47 (7/24/2024)  PMH: TBI as a child, does not drive     Subjective     Pt reports: she does stretches at home,  able to cook and clean. Can feel the quad working .     Pain Scale: Sadaf rates pain on a scale of 0-10 to be 2 currently.    Objective     Sadaf received individual therapeutic exercises to develop strength, endurance, ROM, flexibility, posture, and core stabilization for 30 minutes including:  2 x 15 SLR 2 lbs with hip abduction   2 x 15 prone knee flexion   30 heel slides with end range holds  30 LAQ Ankle pumps 2 lbs seated   Wall mini squats  Seated Hamstring stretch - dynamic     Gait training/NMR: heel to toe, increase FRANKO, retrowalking 15 minutes     IFC to (right) knee x15 minutes with patient having static knee extension stretching during IFC.     Sadaf received the following manual therapy techniques: Joint mobilizations, Manual traction, Soft tissue Mobilization, were applied to the: L knee for 20 minutes including:  IASTM of anterior and posterior L knee   PROM L knee flexion/extension  Manual stretching of hamstring      Written Home Exercises Provided: exercises performed today   Pt demo good understanding of the education provided. Sadaf demonstrated good return demonstration of activities.     Education provided re: standing more at home, being active    Sadaf verbalized good understanding of education provided.   No spiritual or educational barriers to learning provided    FOTO 46 see media for complete details    Assessment       Pt does not have tennis shoes, only wears slides which are worn down. Pt also does not exercise (only walks). Good endurance for strength exercises  today. No c/o pain. Education for icing as needed.     This is a 43 y.o. female referred to outpatient physical therapy and presents with a medical diagnosis of s/p meniscectomy and demonstrates limitations as described in the problem list. Pt prognosis is Good. Pt will continue to benefit from skilled outpatient physical therapy to address the deficits listed in the problem list, provide pt/family education and to maximize pt's level of independence in the home and community environment.     Goals as follows:  Short Term GOALS: 3 weeks. Pt agrees with goals set.  1. Patient demonstrates independence with HEP. Met CB 7/24/2024  2. Patient demonstrates postural awareness with all activities. Progress CB 7/24/2024  3. Patient reports no falls during functional activities. Progress CB 7/24/2024     Long Term GOALS: 6  weeks. Pt agrees with goals set.  1. Patient demonstrates improvement in FOTO score to 52 or greater to demonstrate improvement in functional activity tolerance. Progress CB 7/24/2024  2. Patient demonstrates improvement in LE strength by 1/2 grade or greater. Progress CB 7/24/2024  3. Patient demonstrates (right) knee range of motion WNL. Progress CB 7/24/2024  4. Patient demonstrates decreased frequency, intensity and duration of pain symptoms. Progress CB 7/24/2024  5. Patient report improvement in standing tolerance to 30 minutes to perform daily activities. Progress CB 7/24/2024  6. Goals PRN.    Plan     Continue with established Plan of Care towards PT goals.     Current: Outpatient physical therapy 2 times weekly to include: pt ed, hep, therapeutic exercises, neuromuscular re-education/ balance exercises,Manual therapy and modalities prn. Cont PT for  6 weeks. Pt may be seen by PTA as part of the rehabilitation team.   Certification dates: 6/20/2024-8/2/2024    New: Outpatient physical therapy 2 times weekly to include: pt ed, hep, therapeutic exercises, neuromuscular re-education/ balance  exercises,Manual therapy and modalities prn including but not limited to electrical stimulation, MHP, CP, MFD,. Cont PT for  6 weeks. Pt may be seen by PTA as part of the rehabilitation team.   Certification dates: 8/3/2024-9/13/2024    Therapist: Izzy Barron, MARTIN, CI, MS  7/26/2024

## 2024-07-26 NOTE — TELEPHONE ENCOUNTER
Received fax from [facility] that per pts insurance she does not qualify for CPAP machine. Pt did not want to pay out of pocket.

## 2024-07-31 ENCOUNTER — CLINICAL SUPPORT (OUTPATIENT)
Dept: REHABILITATION | Facility: HOSPITAL | Age: 44
End: 2024-07-31
Payer: MEDICAID

## 2024-07-31 DIAGNOSIS — Z98.890 S/P MENISCECTOMY: Primary | ICD-10-CM

## 2024-07-31 PROCEDURE — 97110 THERAPEUTIC EXERCISES: CPT | Mod: CQ

## 2024-07-31 NOTE — PROGRESS NOTES
Physical Therapy Daily Note     Name: Sadaf Dhaliwal  Clinic Number: [id number]  Diagnosis:   Encounter Diagnosis   Name Primary?    S/P meniscectomy Yes         Physician: Jimenez Sandoval MD  Precautions: none  Visit #: 6 of 12  PTA Visit #: 1  Time In: 1400  Time Out: 1500  FOTO: 47 (7/24/2024)  PMH: TBI as a child, does not drive     Subjective     Pt reports: her Dad with dementia is moving in with her tmw. Pt is doing good. Able to do deep cleaning at home.     Pain Scale: Sadaf rates pain on a scale of 0-10 to be 0 currently.    Objective     Sadaf received individual therapeutic exercises to develop strength, endurance, ROM, flexibility, posture, and core stabilization for 30 minutes including:  2 x 15 SLR 2 lbs with hip abduction   2 x 15 prone knee flexion   30 heel slides with end range holds  30 LAQ Ankle pumps 2 lbs seated   15 Wall mini squats  Seated Hamstring stretch - dynamic   30 Standing Hip abduction and extension  30 Standing leg heel raises       Gait training/NMR: heel to toe, increase FRANKO, retrowalking 15 minutes  -   Toe walking   Side stepping   Side toe walking     Sadaf received the following manual therapy techniques: NONE    Joint mobilizations, Manual traction, Soft tissue Mobilization, were applied to the: L knee for 20 minutes including:  IASTM of anterior and posterior L knee   PROM L knee flexion/extension  Manual stretching of hamstring      Written Home Exercises Provided: exercises performed today   Pt demo good understanding of the education provided. Sadaf demonstrated good return demonstration of activities.     Education provided re: standing more at home, being active    Sadaf verbalized good understanding of education provided.   No spiritual or educational barriers to learning provided    6/20/2024   FOTO 46 see media for complete details    Assessment         Pt did walk over the weekend. Scar healed well. Poor muscular endurance for age  cohort. Required several rest breaks. Pt getting more active and knee responded well to therapy.       This is a 43 y.o. female referred to outpatient physical therapy and presents with a medical diagnosis of s/p meniscectomy and demonstrates limitations as described in the problem list. Pt prognosis is Good. Pt will continue to benefit from skilled outpatient physical therapy to address the deficits listed in the problem list, provide pt/family education and to maximize pt's level of independence in the home and community environment.     Goals as follows:  Short Term GOALS: 3 weeks. Pt agrees with goals set.  1. Patient demonstrates independence with HEP. Met CB 7/24/2024  2. Patient demonstrates postural awareness with all activities. Progress CB 7/24/2024  3. Patient reports no falls during functional activities. Progress CB 7/24/2024     Long Term GOALS: 6  weeks. Pt agrees with goals set.  1. Patient demonstrates improvement in FOTO score to 52 or greater to demonstrate improvement in functional activity tolerance. Progress CB 7/24/2024  2. Patient demonstrates improvement in LE strength by 1/2 grade or greater. Progress CB 7/24/2024  3. Patient demonstrates (right) knee range of motion WNL. Progress CB 7/24/2024  4. Patient demonstrates decreased frequency, intensity and duration of pain symptoms. Progress CB 7/24/2024  5. Patient report improvement in standing tolerance to 30 minutes to perform daily activities. Progress CB 7/24/2024  6. Goals PRN.    Plan     Continue with established Plan of Care towards PT goals.     Current: Outpatient physical therapy 2 times weekly to include: pt ed, hep, therapeutic exercises, neuromuscular re-education/ balance exercises,Manual therapy and modalities prn. Cont PT for  6 weeks. Pt may be seen by PTA as part of the rehabilitation team.   Certification dates: 6/20/2024-8/2/2024    New: Outpatient physical therapy 2 times weekly to include: pt ed, hep, therapeutic  exercises, neuromuscular re-education/ balance exercises,Manual therapy and modalities prn including but not limited to electrical stimulation, MHP, CP, MFD,. Cont PT for  6 weeks. Pt may be seen by PTA as part of the rehabilitation team.   Certification dates: 8/3/2024-9/13/2024    Therapist: Izzy Barron, MARTIN, CI, MS  7/31/2024

## 2024-08-02 ENCOUNTER — CLINICAL SUPPORT (OUTPATIENT)
Dept: REHABILITATION | Facility: HOSPITAL | Age: 44
End: 2024-08-02
Payer: MEDICAID

## 2024-08-02 DIAGNOSIS — Z98.890 S/P MENISCECTOMY: Primary | ICD-10-CM

## 2024-08-02 DIAGNOSIS — M25.561 ACUTE PAIN OF RIGHT KNEE: ICD-10-CM

## 2024-08-02 PROCEDURE — 97110 THERAPEUTIC EXERCISES: CPT

## 2024-08-02 NOTE — PROGRESS NOTES
Physical Therapy Daily Note     Name: Sadaf Dhaliwal  Clinic Number: [id number]  Diagnosis:   Encounter Diagnoses   Name Primary?    S/P meniscectomy Yes    Acute pain of right knee      Physician: Jimenez Sandoval MD  Precautions: none  Visit #: 7 of 12  PTA Visit #: 1  Time In: 1300  Time Out: 1358  FOTO: 47 (7/24/2024)  PMH: TBI as a child, does not drive     Subjective     Pt reports: no new complaints today.     Pain Scale: Sadaf rates pain on a scale of 0-10 to be 0 currently.    Objective     Sadaf received individual therapeutic exercises to develop strength, endurance, ROM, flexibility, posture, and core stabilization for 58 minutes including:  3x10 SLR 2 lbs   3x10 supine hip abduction 2 lbs   3x10 prone knee flexion   30 heel slides with end range holds  30 LAQ Ankle pumps 2 lbs seated   3x10 QS  3x10 adductor squeezes  3x10 SAQ  15 Wall mini squats  Seated Hamstring stretch - dynamic   30 Standing Hip abduction and extension  30 Standing leg heel raises     Gait training/NMR: Patient cued on proper gait technique with new tennis shoes on.   heel to toe, increase FRANKO, retrowalking 15 minutes  -   Toe walking   Side stepping   Side toe walking     Sadaf received the following manual therapy techniques: NONE    Joint mobilizations, Manual traction, Soft tissue Mobilization, were applied to the: none today  L knee for 20 minutes including:  IASTM of anterior and posterior L knee   PROM L knee flexion/extension  Manual stretching of hamstring      Written Home Exercises Provided: exercises performed today   Pt demo good understanding of the education provided. Sadaf demonstrated good return demonstration of activities.     Education provided re: standing more at home, being active    Sadaf verbalized good understanding of education provided.   No spiritual or educational barriers to learning provided    6/20/2024   FOTO 46 see media for complete details    Assessment      Patient tolerated treatment well today. Patient with improvement in (right) knee range of motion and decreased complaints of pain symptoms since beginning therapy treatment. Patient reports increased activity at home. Physical therapist feels patient could continue to benefit from physical therapist intervention to progress towards achievement of all goals and improve QOL. Continue with current POC.       This is a 43 y.o. female referred to outpatient physical therapy and presents with a medical diagnosis of s/p meniscectomy and demonstrates limitations as described in the problem list. Pt prognosis is Good. Pt will continue to benefit from skilled outpatient physical therapy to address the deficits listed in the problem list, provide pt/family education and to maximize pt's level of independence in the home and community environment.     Goals as follows:  Short Term GOALS: 3 weeks. Pt agrees with goals set.  1. Patient demonstrates independence with HEP. Met CB 7/24/2024  2. Patient demonstrates postural awareness with all activities. Progress CB 7/24/2024  3. Patient reports no falls during functional activities. Progress CB 7/24/2024     Long Term GOALS: 6  weeks. Pt agrees with goals set.  1. Patient demonstrates improvement in FOTO score to 52 or greater to demonstrate improvement in functional activity tolerance. Progress CB 7/24/2024  2. Patient demonstrates improvement in LE strength by 1/2 grade or greater. Progress CB 7/24/2024  3. Patient demonstrates (right) knee range of motion WNL. Progress CB 7/24/2024  4. Patient demonstrates decreased frequency, intensity and duration of pain symptoms. Progress CB 7/24/2024  5. Patient report improvement in standing tolerance to 30 minutes to perform daily activities. Progress CB 7/24/2024  6. Goals PRN.    Plan     Continue with established Plan of Care towards PT goals.     Current: Outpatient physical therapy 2 times weekly to include: pt ed, hep,  therapeutic exercises, neuromuscular re-education/ balance exercises,Manual therapy and modalities prn. Cont PT for  6 weeks. Pt may be seen by PTA as part of the rehabilitation team.   Certification dates: 6/20/2024-8/2/2024    New: Outpatient physical therapy 2 times weekly to include: pt ed, hep, therapeutic exercises, neuromuscular re-education/ balance exercises,Manual therapy and modalities prn including but not limited to electrical stimulation, MHP, CP, MFD,. Cont PT for  6 weeks. Pt may be seen by PTA as part of the rehabilitation team.   Certification dates: 8/3/2024-9/13/2024    Therapist: Carmelita Jimenez, PT, MPT  8/2/2024

## 2024-08-09 ENCOUNTER — CLINICAL SUPPORT (OUTPATIENT)
Dept: REHABILITATION | Facility: HOSPITAL | Age: 44
End: 2024-08-09
Payer: MEDICAID

## 2024-08-09 DIAGNOSIS — Z98.890 S/P MENISCECTOMY: Primary | ICD-10-CM

## 2024-08-09 DIAGNOSIS — M25.561 ACUTE PAIN OF RIGHT KNEE: ICD-10-CM

## 2024-08-09 PROCEDURE — 97110 THERAPEUTIC EXERCISES: CPT

## 2024-08-13 ENCOUNTER — PATIENT MESSAGE (OUTPATIENT)
Dept: ADMINISTRATIVE | Facility: HOSPITAL | Age: 44
End: 2024-08-13
Payer: MEDICAID

## 2024-08-15 DIAGNOSIS — I10 PRIMARY HYPERTENSION: ICD-10-CM

## 2024-08-15 RX ORDER — HYDROCHLOROTHIAZIDE 12.5 MG/1
TABLET ORAL
Qty: 30 TABLET | Refills: 5 | Status: SHIPPED | OUTPATIENT
Start: 2024-08-15

## 2024-08-16 ENCOUNTER — CLINICAL SUPPORT (OUTPATIENT)
Dept: REHABILITATION | Facility: HOSPITAL | Age: 44
End: 2024-08-16
Payer: MEDICAID

## 2024-08-16 DIAGNOSIS — Z98.890 S/P MENISCECTOMY: Primary | ICD-10-CM

## 2024-08-16 PROCEDURE — 97110 THERAPEUTIC EXERCISES: CPT | Mod: CQ

## 2024-08-16 NOTE — PROGRESS NOTES
Physical Therapy Daily Note     Name: Sadaf Dhaliwal  Clinic Number: [id number]  Diagnosis:   Encounter Diagnosis   Name Primary?    S/P meniscectomy Yes       Physician: Jimenez Sandoval MD  Precautions: none  Visit #: 8 of 12  PTA Visit #: 1  Time In: 1303  Time Out: 1400  FOTO: 47 (7/24/2024)  FOTO: 79 (8/16/2024)  PMH: TBI as a child, does not drive     Subjective     Pt reports: soreness in calf and achilles. Pt has increased her activities at home.  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 Knee is achy at times.     Pain Scale: Sadaf rates pain on a scale of 0-10 to be 1 currently.    Objective     Sadaf received individual therapeutic exercises to develop strength, endurance, ROM, flexibility, posture, and core stabilization for 54 minutes including:  3x10 SLR 5 lbs   3x10 SL hip abduction 5 lbs  3x10 prone knee flexion 2 lbs  3x10 prone hip extension 2 lbs  30 heel slides with end range holds  30 seated LAQ Ankle pumps 2 lbs   20 Wall squats    Gait training/NMR: patient encouraged to perform heel toe gait pattern  heel to toe, increase FRANKO, retrowalking 15 minutes  -   Toe walking   Side stepping   Side toe walking     Sadaf received the following manual therapy techniques: NONE    Joint mobilizations, Manual traction, Soft tissue Mobilization, were applied to the: none today      Written Home Exercises Provided: walking in snow shoes for 1/2 hour  Pt demo good understanding of the education provided. Sadaf demonstrated good return demonstration of activities.     Education provided re: standing more at home, being active    Sadaf verbalized good understanding of education provided.   No spiritual or educational barriers to learning provided        Assessment     Patient with improved quad, glute and hamstring activation strength and endurance. Squats were hard for her but she endured it. Pt was pleased with the session.       This is a 44 y.o. female referred to  outpatient physical therapy and presents with a medical diagnosis of s/p meniscectomy and demonstrates limitations as described in the problem list. Pt prognosis is Good. Pt will continue to benefit from skilled outpatient physical therapy to address the deficits listed in the problem list, provide pt/family education and to maximize pt's level of independence in the home and community environment.     Goals as follows:  Short Term GOALS: 3 weeks. Pt agrees with goals set.  1. Patient demonstrates independence with HEP. Met CB 7/24/2024  2. Patient demonstrates postural awareness with all activities. Progress CB 7/24/2024  3. Patient reports no falls during functional activities. Progress CB 7/24/2024     Long Term GOALS: 6  weeks. Pt agrees with goals set.  1. Patient demonstrates improvement in FOTO score to 52 or greater to demonstrate improvement in functional activity tolerance. Progress CB 7/24/2024  2. Patient demonstrates improvement in LE strength by 1/2 grade or greater. Progress CB 7/24/2024  3. Patient demonstrates (right) knee range of motion WNL. Progress CB 7/24/2024  4. Patient demonstrates decreased frequency, intensity and duration of pain symptoms. Progress CB 7/24/2024  5. Patient report improvement in standing tolerance to 30 minutes to perform daily activities. Progress CB 7/24/2024  6. Goals PRN.    Plan     Continue with established Plan of Care towards PT goals.     New: Outpatient physical therapy 2 times weekly to include: pt ed, hep, therapeutic exercises, neuromuscular re-education/ balance exercises,Manual therapy and modalities prn including but not limited to electrical stimulation, MHP, CP, MFD,. Cont PT for  6 weeks. Pt may be seen by PTA as part of the rehabilitation team.   Certification dates: 8/3/2024-9/13/2024    Therapist: Izzy Barron, MARTIN, CI, MS  8/16/2024

## 2024-08-19 ENCOUNTER — CLINICAL SUPPORT (OUTPATIENT)
Dept: REHABILITATION | Facility: HOSPITAL | Age: 44
End: 2024-08-19
Payer: MEDICAID

## 2024-08-19 DIAGNOSIS — Z98.890 S/P MENISCECTOMY: Primary | ICD-10-CM

## 2024-08-19 PROCEDURE — 97110 THERAPEUTIC EXERCISES: CPT | Mod: CQ

## 2024-08-19 NOTE — PROGRESS NOTES
Physical Therapy Daily Note     Name: Sadaf Dhaliwal  Clinic Number: [id number]  Diagnosis:   Encounter Diagnosis   Name Primary?    S/P meniscectomy Yes         Physician: Jimenez Sandoval MD  Precautions: none  Visit #: 11 of 12  PTA Visit #: 1  Time In: 1303  Time Out: 1400  FOTO: 47 (7/24/2024)  FOTO: 79 (8/16/2024)  PMH: TBI as a child, does not drive     Subjective     Pt reports: hamstring and calves are sore. Walking 15 minutes.     Pain Scale: Sadaf rates pain on a scale of 0-10 to be 1 currently.    Objective     Sadaf received individual therapeutic exercises/ NMR to develop strength, endurance, ROM, flexibility, posture, and core stabilization for 45 minutes including:  Bike resistance 4 - 10 minutes   3x10 SLR 5 lbs   3x10 SL hip abduction 5 lbs  3x10 prone knee flexion 5 lbs  3x10 prone hip extension 5 bs  30 seated LAQ Ankle pumps 5 lbs   20 Wall squats    Gait training/NMR: patient encouraged to perform heel toe gait pattern   heel to toe, increase FRNAKO, retrowalking 15 minutes  -   Toe walking   Side stepping   Side toe walking     Sadaf received the following manual therapy techniques: NONE    Joint mobilizations, Manual traction, Soft tissue Mobilization, were applied to the: none today      Written Home Exercises Provided: walking in snow shoes for 1/2 hour  Pt demo good understanding of the education provided. Sadaf demonstrated good return demonstration of activities.     Education provided re: standing more at home, being active    Sadaf verbalized good understanding of education provided.   No spiritual or educational barriers to learning provided        Assessment     Patient with improved quad, glute and hamstring activation strength and endurance. Cues required for squat mechanics posture.  Pt had slight pain in lateral incision post session, ice pack for 10 minutes. Pt felt better after.       This is a 44 y.o. female referred to outpatient physical  therapy and presents with a medical diagnosis of s/p meniscectomy and demonstrates limitations as described in the problem list. Pt prognosis is Good. Pt will continue to benefit from skilled outpatient physical therapy to address the deficits listed in the problem list, provide pt/family education and to maximize pt's level of independence in the home and community environment.     Goals as follows:  Short Term GOALS: 3 weeks. Pt agrees with goals set.  1. Patient demonstrates independence with HEP. Met CB 7/24/2024  2. Patient demonstrates postural awareness with all activities. Progress CB 7/24/2024  3. Patient reports no falls during functional activities. Progress CB 7/24/2024     Long Term GOALS: 6  weeks. Pt agrees with goals set.  1. Patient demonstrates improvement in FOTO score to 52 or greater to demonstrate improvement in functional activity tolerance. Progress CB 7/24/2024  2. Patient demonstrates improvement in LE strength by 1/2 grade or greater. Progress CB 7/24/2024  3. Patient demonstrates (right) knee range of motion WNL. Progress CB 7/24/2024  4. Patient demonstrates decreased frequency, intensity and duration of pain symptoms. Progress CB 7/24/2024  5. Patient report improvement in standing tolerance to 30 minutes to perform daily activities. Progress CB 7/24/2024  6. Goals PRN.    Plan     Continue with established Plan of Care towards PT goals.     New: Outpatient physical therapy 2 times weekly to include: pt ed, hep, therapeutic exercises, neuromuscular re-education/ balance exercises,Manual therapy and modalities prn including but not limited to electrical stimulation, MHP, CP, MFD,. Cont PT for  6 weeks. Pt may be seen by PTA as part of the rehabilitation team.   Certification dates: 8/3/2024-9/13/2024    Therapist: Izzy Barron, MARTIN, CI, MS  8/19/2024

## 2024-08-23 ENCOUNTER — CLINICAL SUPPORT (OUTPATIENT)
Dept: REHABILITATION | Facility: HOSPITAL | Age: 44
End: 2024-08-23
Payer: MEDICAID

## 2024-08-23 DIAGNOSIS — Z98.890 S/P MENISCECTOMY: Primary | ICD-10-CM

## 2024-08-23 PROCEDURE — 97110 THERAPEUTIC EXERCISES: CPT | Mod: CQ

## 2024-08-23 NOTE — PROGRESS NOTES
Physical Therapy Daily Note     Name: Sadaf Dhaliwal  Clinic Number: [id number]  Diagnosis:   Encounter Diagnosis   Name Primary?    S/P meniscectomy Yes         Physician: Jimenez Sandoval MD  Precautions: none  Visit #: 11 of 12  PTA Visit #: 3  Time In: 1303  Time Out: 1400  FOTO: 47 (7/24/2024)  FOTO: 79 (8/16/2024)  PMH: TBI as a child, does not drive     Subjective     Pt reports: pain in medial knee, constant pain for past 2 weeks.     Pain Scale: Sadaf rates pain on a scale of 0-10 to be 1 currently.    Objective     Sadaf received individual therapeutic exercises/ NMR to develop strength, endurance, ROM, flexibility, posture, and core stabilization for 45 minutes including:  Bike resistance 4 - 10 minutes   30 SLR   30 SL hip abduction with slow eccentric   30 prone knee flexion/hip extension   30 seated LAQ Ankle pumps   20 Wall squats    Gait training/NMR: patient encouraged to perform heel toe gait pattern   heel to toe, increase FRANKO, retrowalking 15 minutes  -   Toe walking   Side stepping   Side toe walking     Sadaf received the following manual therapy techniques: NONE    Joint mobilizations, Manual traction, Soft tissue Mobilization, were applied to the: none today      Written Home Exercises Provided: walking in altra shoes for 1/2 hour  Pt demo good understanding of the education provided. Sadaf demonstrated good return demonstration of activities.     Education provided re: standing more at home, being active    Sadaf verbalized good understanding of education provided.   No spiritual or educational barriers to learning provided        Assessment     Only body weight for exercises due to pain. Education to stop wearing Mei shoes and switch to Altra (zero drop). Patient with improved quad, glute and hamstring activation strength and endurance. Cues required for squat mechanics posture.  Pt is fatigued overall.     This is a 44 y.o. female referred to outpatient  physical therapy and presents with a medical diagnosis of s/p meniscectomy and demonstrates limitations as described in the problem list. Pt prognosis is Good. Pt will continue to benefit from skilled outpatient physical therapy to address the deficits listed in the problem list, provide pt/family education and to maximize pt's level of independence in the home and community environment.     Goals as follows:  Short Term GOALS: 3 weeks. Pt agrees with goals set.  1. Patient demonstrates independence with HEP. Met CB 7/24/2024  2. Patient demonstrates postural awareness with all activities. Progress CB 7/24/2024  3. Patient reports no falls during functional activities. Progress CB 7/24/2024     Long Term GOALS: 6  weeks. Pt agrees with goals set.  1. Patient demonstrates improvement in FOTO score to 52 or greater to demonstrate improvement in functional activity tolerance. Progress CB 7/24/2024  2. Patient demonstrates improvement in LE strength by 1/2 grade or greater. Progress CB 7/24/2024  3. Patient demonstrates (right) knee range of motion WNL. Progress CB 7/24/2024  4. Patient demonstrates decreased frequency, intensity and duration of pain symptoms. Progress CB 7/24/2024  5. Patient report improvement in standing tolerance to 30 minutes to perform daily activities. Progress CB 7/24/2024  6. Goals PRN.    Plan     Continue with established Plan of Care towards PT goals.     New: Outpatient physical therapy 2 times weekly to include: pt ed, hep, therapeutic exercises, neuromuscular re-education/ balance exercises,Manual therapy and modalities prn including but not limited to electrical stimulation, MHP, CP, MFD,. Cont PT for  6 weeks. Pt may be seen by PTA as part of the rehabilitation team.   Certification dates: 8/3/2024-9/13/2024    Therapist: Izzy Barron, MARTIN, CI, MS  8/23/2024

## 2024-08-28 ENCOUNTER — CLINICAL SUPPORT (OUTPATIENT)
Dept: REHABILITATION | Facility: HOSPITAL | Age: 44
End: 2024-08-28
Payer: MEDICAID

## 2024-08-28 DIAGNOSIS — Z98.890 S/P MENISCECTOMY: Primary | ICD-10-CM

## 2024-08-28 PROCEDURE — 97110 THERAPEUTIC EXERCISES: CPT

## 2024-08-28 NOTE — PROGRESS NOTES
"                          Physical Therapy Daily Note     Name: Sadaf Dhaliwal  Clinic Number: [id number]  Diagnosis:   Encounter Diagnosis   Name Primary?    S/P meniscectomy Yes       Physician: Jimenez Sandoval MD  Precautions: none  Visit #: 12 of 19  PTA Visit #: 0  Time In: 1458  Time Out: 1554  FOTO: 47 (7/24/2024)  FOTO: 79 (8/16/2024)  PMH: TBI as a child, does not drive     Subjective     Pt reports: continued complaints of (right) knee pain with it fluctuating between medial and lateral as well as posterior knee pain. Patient reports sometimes her knee "feels like cement." Currently reporting posterior (right) knee pain.    Pain Scale: Sadaf rates pain on a scale of 0-10 to be 4 currently.    Objective     Sadaf received individual therapeutic exercises/ NMR to develop strength, endurance, ROM, flexibility, posture, and core stabilization for 46 minutes including:  Bike resistance 4 - 10 minutes   30 QS  30 SAQ  30 bridges with knees extended  30 supine ball rolls to facilitate knee flexion  30 SLR   30 adductor squeezes  30 supine clams red theraband   30 SL hip abduction with slow eccentric   30 prone knee flexion/hip extension   30 seated LAQ Ankle pumps   30 seated marching  20 Wall squats    Gait training/NMR: patient encouraged to perform heel toe gait pattern   heel to toe, increase FRANKO,    Sadaf received the following manual therapy techniques: IASTM to (right) knee anterior and posterior chains  to improve mobility, increases collagen production, and restore proper functioning.        Written Home Exercises Provided: previously provided  Pt demo good understanding of the education provided. Sadaf demonstrated good return demonstration of activities.     Education provided re: standing more at home, being active    Sadaf verbalized good understanding of education provided.   No spiritual or educational barriers to learning provided        Assessment     Patient tolerated treatment well. " Patient reports (right) knee pain and tightness reduced after manual therapy. Patient with improvement in quad recruitment. Patient encouraged to continue performing home exercise program. Physical therapist feels patient could continue to benefit form physical therapist intervention to progress towards achievement of goals.     This is a 44 y.o. female referred to outpatient physical therapy and presents with a medical diagnosis of s/p meniscectomy and demonstrates limitations as described in the problem list. Pt prognosis is Good. Pt will continue to benefit from skilled outpatient physical therapy to address the deficits listed in the problem list, provide pt/family education and to maximize pt's level of independence in the home and community environment.     Goals as follows:  Short Term GOALS: 3 weeks. Pt agrees with goals set.  1. Patient demonstrates independence with HEP. Met CB 7/24/2024  2. Patient demonstrates postural awareness with all activities. Progress CB 7/24/2024  3. Patient reports no falls during functional activities. Progress CB 7/24/2024     Long Term GOALS: 6  weeks. Pt agrees with goals set.  1. Patient demonstrates improvement in FOTO score to 52 or greater to demonstrate improvement in functional activity tolerance. Progress CB 7/24/2024  2. Patient demonstrates improvement in LE strength by 1/2 grade or greater. Progress CB 7/24/2024  3. Patient demonstrates (right) knee range of motion WNL. Progress CB 7/24/2024  4. Patient demonstrates decreased frequency, intensity and duration of pain symptoms. Progress CB 7/24/2024  5. Patient report improvement in standing tolerance to 30 minutes to perform daily activities. Progress CB 7/24/2024  6. Goals PRN.    Plan     Continue with established Plan of Care towards PT goals.     New: Outpatient physical therapy 2 times weekly to include: pt ed, hep, therapeutic exercises, neuromuscular re-education/ balance exercises,Manual therapy and  modalities prn including but not limited to electrical stimulation, MHP, CP, MFD,. Cont PT for  6 weeks. Pt may be seen by PTA as part of the rehabilitation team.   Certification dates: 8/3/2024-9/13/2024    Therapist: Carmelita Jimenez, PT, MPT  8/28/2024

## 2024-09-04 PROBLEM — Z87.828 S/P ARTHROSCOPIC PARTIAL LATERAL MENISCECTOMY OF RIGHT KNEE: Status: ACTIVE | Noted: 2024-09-04

## 2024-09-04 PROBLEM — Z98.890 S/P ARTHROSCOPIC PARTIAL LATERAL MENISCECTOMY OF RIGHT KNEE: Status: ACTIVE | Noted: 2024-09-04

## 2024-09-06 ENCOUNTER — CLINICAL SUPPORT (OUTPATIENT)
Dept: REHABILITATION | Facility: HOSPITAL | Age: 44
End: 2024-09-06
Payer: MEDICAID

## 2024-09-06 DIAGNOSIS — M25.561 ACUTE PAIN OF RIGHT KNEE: ICD-10-CM

## 2024-09-06 DIAGNOSIS — Z98.890 S/P MENISCECTOMY: Primary | ICD-10-CM

## 2024-09-06 DIAGNOSIS — S83.281D ACUTE LATERAL MENISCAL TEAR, RIGHT, SUBSEQUENT ENCOUNTER: ICD-10-CM

## 2024-09-06 PROCEDURE — 97110 THERAPEUTIC EXERCISES: CPT

## 2024-09-06 NOTE — PROGRESS NOTES
Physical Therapy Daily Note     Name: Sadaf Dhaliwal  Clinic Number: [id number]  Diagnosis:   No diagnosis found.      Physician: Jimenez Sandoval MD  Precautions: none  Visit #: 12 of 19  PTA Visit #: 0  Time In: 1300  Time Out: 1357  FOTO: 47 (7/24/2024)  FOTO: 79 (8/16/2024)  PMH: TBI as a child, does not drive     Subjective     Pt reports: she is compliant with HEP and she has been able to perform her exercises daily. She does not report any significant pain this visit     Pain Scale: Sadaf rates pain on a scale of 0-10 to be 1 currently.    Objective     Sadaf received individual therapeutic exercises/ NMR to develop strength, endurance, ROM, flexibility, posture, and core stabilization for 46 minutes including:  Bike resistance 4 - 10 minutes   30 QS  30 SAQ  30 bridges with knees extended  30 supine ball rolls to facilitate knee flexion  30 SLR   30 adductor squeezes  30 supine clams red theraband   30 SL hip abduction with slow eccentric   30 prone knee flexion/hip extension   30 seated LAQ Ankle pumps   30 seated marching  3x10 SL Clamshells RTB  20 Wall squats    Gait training/NMR: patient encouraged to perform heel toe gait pattern   heel to toe, increase FRANKO,    Sadaf received the following manual therapy techniques: IASTM to (right) knee anterior and posterior chains  to improve mobility, increases collagen production, and restore proper functioning.        Written Home Exercises Provided: previously provided  Pt demo good understanding of the education provided. Sadaf demonstrated good return demonstration of activities.     Education provided re: standing more at home, being active    Sadaf verbalized good understanding of education provided.   No spiritual or educational barriers to learning provided        Assessment     Patient is able to tolerate all treatment well this visit with no significant pain being noted this visit. She is able to tolerate progression of  treatment this visit with neville. She is demonstrating poor form that is corrected with verbal and tactile cuing in order to ensure glute med activation in order to improve stability. Patient will continue to progress as tolerated.     This is a 44 y.o. female referred to outpatient physical therapy and presents with a medical diagnosis of s/p meniscectomy and demonstrates limitations as described in the problem list. Pt prognosis is Good. Pt will continue to benefit from skilled outpatient physical therapy to address the deficits listed in the problem list, provide pt/family education and to maximize pt's level of independence in the home and community environment.     Goals as follows:  Short Term GOALS: 3 weeks. Pt agrees with goals set.  1. Patient demonstrates independence with HEP. Met CB 7/24/2024  2. Patient demonstrates postural awareness with all activities. Progress CB 7/24/2024  3. Patient reports no falls during functional activities. Progress CB 7/24/2024     Long Term GOALS: 6  weeks. Pt agrees with goals set.  1. Patient demonstrates improvement in FOTO score to 52 or greater to demonstrate improvement in functional activity tolerance. Progress CB 7/24/2024  2. Patient demonstrates improvement in LE strength by 1/2 grade or greater. Progress CB 7/24/2024  3. Patient demonstrates (right) knee range of motion WNL. Progress CB 7/24/2024  4. Patient demonstrates decreased frequency, intensity and duration of pain symptoms. Progress CB 7/24/2024  5. Patient report improvement in standing tolerance to 30 minutes to perform daily activities. Progress CB 7/24/2024  6. Goals PRN.    Plan     Continue with established Plan of Care towards PT goals.     New: Outpatient physical therapy 2 times weekly to include: pt ed, hep, therapeutic exercises, neuromuscular re-education/ balance exercises,Manual therapy and modalities prn including but not limited to electrical stimulation, MHP, CP, MFD,. Cont PT for  6  weeks. Pt may be seen by PTA as part of the rehabilitation team.   Certification dates: 8/3/2024-9/13/2024    Therapist: Stan Escalera, PT, DPT, MTC  9/6/2024

## 2024-09-14 DIAGNOSIS — J30.89 ENVIRONMENTAL AND SEASONAL ALLERGIES: ICD-10-CM

## 2024-09-14 DIAGNOSIS — R09.81 NOSE CONGESTION: ICD-10-CM

## 2024-09-14 DIAGNOSIS — I10 PRIMARY HYPERTENSION: ICD-10-CM

## 2024-09-17 ENCOUNTER — DOCUMENTATION ONLY (OUTPATIENT)
Dept: REHABILITATION | Facility: HOSPITAL | Age: 44
End: 2024-09-17
Payer: MEDICAID

## 2024-09-17 RX ORDER — LEVOCETIRIZINE DIHYDROCHLORIDE 5 MG/1
TABLET, FILM COATED ORAL
Qty: 30 TABLET | Refills: 5 | Status: SHIPPED | OUTPATIENT
Start: 2024-09-17

## 2024-09-17 RX ORDER — LOSARTAN POTASSIUM 100 MG/1
TABLET ORAL
Qty: 30 TABLET | Refills: 11 | Status: SHIPPED | OUTPATIENT
Start: 2024-09-17

## 2024-09-17 NOTE — PROGRESS NOTES
PHYSICAL THERAPY DISCHARGE:    This patient was originally evaluated at our facility on: 6/20/24         Pt attended PT for a total of 13 Visits receiving: Manual Therapy, Therex, Postural Education, Body Mechanics Training, Home Exercise Instruction     This patient's last visit occurred on: 9/6/24      Patient reports no pain and reports she is able to perform all of her ADLs     Pt was DC'd from our care secondary to: decrease in symptoms        Therapist: Stan Escalera, PT, DPT, MTC  9/17/2024

## 2024-09-27 ENCOUNTER — OFFICE VISIT (OUTPATIENT)
Dept: OTOLARYNGOLOGY | Facility: CLINIC | Age: 44
End: 2024-09-27
Payer: MEDICAID

## 2024-09-27 VITALS
BODY MASS INDEX: 37.69 KG/M2 | DIASTOLIC BLOOD PRESSURE: 84 MMHG | SYSTOLIC BLOOD PRESSURE: 121 MMHG | HEIGHT: 60 IN | HEART RATE: 90 BPM | WEIGHT: 192 LBS

## 2024-09-27 DIAGNOSIS — F17.200 SMOKING: ICD-10-CM

## 2024-09-27 DIAGNOSIS — J38.1 REINKE'S EDEMA OF VOCAL FOLDS: ICD-10-CM

## 2024-09-27 DIAGNOSIS — J34.2 DEVIATED SEPTUM: ICD-10-CM

## 2024-09-27 DIAGNOSIS — J32.9 CHRONIC SINUSITIS, UNSPECIFIED LOCATION: ICD-10-CM

## 2024-09-27 DIAGNOSIS — H91.90 HEARING LOSS, UNSPECIFIED HEARING LOSS TYPE, UNSPECIFIED LATERALITY: ICD-10-CM

## 2024-09-27 DIAGNOSIS — Z98.890 HISTORY OF MYRINGOTOMY: ICD-10-CM

## 2024-09-27 DIAGNOSIS — H60.63 CHRONIC NON-INFECTIVE OTITIS EXTERNA OF BOTH EARS, UNSPECIFIED TYPE: ICD-10-CM

## 2024-09-27 DIAGNOSIS — H68.023 CHRONIC EUSTACHIAN SALPINGITIS, BILATERAL: Primary | ICD-10-CM

## 2024-09-27 PROCEDURE — 99999 PR PBB SHADOW E&M-EST. PATIENT-LVL III: CPT | Mod: PBBFAC,,, | Performed by: OTOLARYNGOLOGY

## 2024-09-27 PROCEDURE — 99213 OFFICE O/P EST LOW 20 MIN: CPT | Mod: PBBFAC,PN | Performed by: OTOLARYNGOLOGY

## 2024-09-27 RX ORDER — MINERAL OIL 1000 MG/ML
LIQUID TOPICAL
Start: 2024-09-27

## 2024-09-27 RX ORDER — DICYCLOMINE HYDROCHLORIDE 10 MG/1
10 CAPSULE ORAL 3 TIMES DAILY
COMMUNITY
Start: 2024-09-17

## 2024-09-27 RX ORDER — FLUOCINOLONE ACETONIDE 0.11 MG/ML
OIL AURICULAR (OTIC)
Qty: 20 ML | Refills: 0 | Status: SHIPPED | OUTPATIENT
Start: 2024-09-27

## 2024-09-27 NOTE — PROGRESS NOTES
[facility]    Subjective:      Patient: Sadaf Dhaliwal Patient PCP: Good Conway DO         :  1980     Sex:  female      MRN:  [id number]          Date of Visit: 2024      Chief Complaint: Ear Fullness (Nasal congestion/SNOT 24/110)      Patient ID: Sadaf Dhaliwal is a 44 y.o. female     2024 established patient visit patient last seen a little over 2 months ago with multiple complaints including Eustachian tube dysfunction, deviated septum and nasal congestion, sleep disturbance and ongoing smoking with Piter's edema on endoscopy.  Audiologic testing and weaning down plan for smoking cessation over 6 weeks was outlined discussed.    Audiogram not completed due to insurance refusing to provide adequate reimbursement for transportation for audiogram.  Patient told she needed a hearing test in [place] or somewhere closer to her home.  No pain or drainage but significant itching in occasional fullness of the ears.  Try not to use Q-tips.    Smoking continues.  She made some headway but then became stressed and started smoking more regularly.     2024 established patient visit routine follow up with multiple ENT issues.  Longstanding history of Eustachian tube dysfunction with repeated sets of tubes in childhood with findings of S deviation of the septum ongoing sinus complaints and congestion with retraction of the tympanic membranes down to the IS joint without adhesion appreciated and some wisps of fluid.  Significant sleep disturbance.      Sleep study, hearing testing, nasal steroids, smoking cessation with Zyban implants for nasal endoscopy/laryngoscopy on return.      No audiogram.  Did not take Zyban as she had issues with bupropion in the past making her angry breaking or glasses and punching walls.    Sleep study with an AHI of 3.1 primarily due to hypopneas not notably elevated in REM sleep.  Consistent with upper airway resistant syndrome with loud  snoring and mild the oxygenation with scott oxygen of 88% was 0 minutes below 88%.  AHI was consistent with mild obstructive sleep apnea at 7 in the supine position.      05/10/2024 initial patient consultation Patient is a  greater than 20 pack year current smoker with a past medical history of immunosuppression with Imuran Humira for Crohn's disease, hypothyroidism, type 2 diabetes, migraine on Ubrelvy and Elavil, HTN, HLD referred to me by Dr. Good Conway in consultation for right-greater-than-left quit otitis media nasal congestion cough.    No audiogram.  No head/ear imaging.    Patient reports dramatic snoring and witnessed apneas with daytime fatigue.  Comorbidities has a above.      Multiple sets of tubes (reported 6 times) in childhood none in recent ears.    Prior nasal trauma nasal reconstructive surgery with chronic nasal obstruction.      We will up to quit smoking has had no benefit from prior nicotine supplementation/replacement therapy.    Labs:  WBC   Date Value Ref Range Status   04/25/2024 8.37 3.90 - 12.70 K/uL Final     Hemoglobin   Date Value Ref Range Status   04/25/2024 14.4 12.0 - 16.0 g/dL Final     Platelets   Date Value Ref Range Status   04/25/2024 231 150 - 450 K/uL Final     Creatinine   Date Value Ref Range Status   04/25/2024 1.0 0.5 - 1.4 mg/dL Final     TSH   Date Value Ref Range Status   04/25/2024 2.260 0.400 - 4.000 uIU/mL Final     Comment:     ATTENTION: The use of Biotin Supplements may interfere with this   assay.       Hemoglobin A1C   Date Value Ref Range Status   04/25/2024 7.3 (H) 4.0 - 5.6 % Final     Comment:     ADA Screening Guidelines:  5.7-6.4%  Consistent with prediabetes  >or=6.5%  Consistent with diabetes    High levels of fetal hemoglobin interfere with the HbA1C  assay. Heterozygous hemoglobin variants (HbS, HgC, etc)do  not significantly interfere with this assay.   However, presence of multiple variants may affect accuracy.         Past Medical  History  She has a past medical history of ADHD (attention deficit hyperactivity disorder), Allergies, Anxiety, Arthritis, Autism, Bipolar disorder, Chronic nausea, Crohn's colitis, Depression, Heart murmur, High cholesterol, History of rectal surgery, Hypertension, Hypothyroidism, Neuropathy, and Pneumonia due to COVID-19 virus.    Family / Surgical / Social History  Her family history includes Breast cancer in her mother; Lung cancer in her mother; No Known Problems in her daughter, father, maternal aunt, maternal grandfather, maternal grandmother, maternal uncle, paternal aunt, paternal grandfather, paternal grandmother, paternal uncle, and sister; Ovarian cancer in her mother.    Past Surgical History:   Procedure Laterality Date    APPENDECTOMY       SECTION      CHOLECYSTECTOMY      COLONOSCOPY N/A 2020    Procedure: COLONOSCOPY;  Surgeon: Brittnee Dueñas MD;  Location: [facility];  Service: Endoscopy;  Laterality: N/A;    COLONOSCOPY N/A 2021    Procedure: COLONOSCOPY;  Surgeon: Brittnee Dueñas MD;  Location: [facility];  Service: Endoscopy;  Laterality: N/A;  needs rapid COVID    KNEE ARTHROSCOPY W/ MENISCECTOMY Right 2024    Procedure: ARTHROSCOPY, KNEE, WITH MENISCECTOMY;  Surgeon: Jimenez Sandoval MD;  Location: [facility];  Service: Orthopedics;  Laterality: Right;    NASAL FRACTURE SURGERY      tubes in ears 6 times         Social History     Tobacco Use    Smoking status: Every Day     Current packs/day: 1.00     Average packs/day: 1 pack/day for 24.1 years (24.1 ttl pk-yrs)     Types: Cigarettes     Start date: 2000     Passive exposure: Never    Smokeless tobacco: Never   Substance and Sexual Activity    Alcohol use: Never    Drug use: Not Currently     Types: Marijuana    Sexual activity: Not Currently     Partners: Female       Medications  She has a current medication list which includes the following prescription(s): humira pen crohns-uc-hs start, amitriptyline,  azathioprine, dicyclomine, hydrochlorothiazide, levocetirizine, levothyroxine, losartan, meloxicam, metformin, rosuvastatin, ubrelvy, and valacyclovir, and the following Facility-Administered Medications: mupirocin.      Allergies  Review of patient's allergies indicates:   Allergen Reactions    Clindamycin Swelling     Tongue swellong    Iodine and iodide containing products Nausea And Vomiting    Aspirin      Pt reports vomiting blood after taking.    Dayhist allergy [clemastine]      Other reaction(s): numbness to head    Flu vac 2023 [id number](pf)      Other Reaction(s): nausea and vomiting    Wellbutrin [bupropion hcl]      Makes angry       All medications, allergies, and past history have been reviewed.    Objective:      Vitals:      8/21/2024    11:18 AM 9/4/2024     3:41 PM 9/27/2024     2:50 PM   Vitals - 1 value per visit   SYSTOLIC 108 144 121   DIASTOLIC 78 83 84   Pulse 82 78 90   Temp 97.9 °F (36.6 °C)     Resp 18 16    Weight (lb) 188.6 189.6 192.02   Weight (kg) 85.55 86 87.1   Height 5' (1.524 m) 5' (1.524 m) 5' (1.524 m)   BMI (Calculated) 36.8 37 37.5   Pain Score Two Five Zero       Body surface area is 1.92 meters squared.    Physical Exam:    GENERAL  APPEARANCE -  alert, appears stated age, and cooperative  BARRIER(S) TO COMMUNICATION -  none VOICE - smokers VOICE     INTEGUMENTARY  no suspicious head and neck lesions    HEENT  HEAD: Normocephalic, without obvious abnormality, atraumatic  FACE: INSPECTION - Symmetric, no signs of trauma, no suspicious lesion(s)      STRENGTH - facial symmetry intact     PALPATION -  No masses     SALIVARY GLANDS - non-tender with no appreciable mass    NECK/THYROID: normal atraumatic, no neck masses, normal thyroid, no jvd    EYES  Normal occular alignment and mobility with no visible nystagmus at rest    EARS/NOSE/MOUTH/THROAT  EARS  PINNAE AND EXTERNAL EARS - no suspicious lesion OTOSCOPIC EXAM (surgical microscopy was used for  visualization/instrumentation): EAR EXAM -both ears with some retraction left appearing to be more so with possibly middle ear effusion.  Some wax on the tympanic membranes bilaterally but not impacted.  HEARING - diminished but intact    NOSE AND SINUSES  EXTERNAL NOSE - flat broad scarred nasal dorsum  SEPTUM - S deformity with bilateral right-greater-than-left obstruction TURBINATES - edematous congested 2-3 + MUCOSA - edematous congested pink with moderately thick mucus without purulence     MOUTH AND THROAT   ORAL CAVITY, LIPS, TEETH, GUMS & TONGUE - Ribera 3b  OROPHARYNX /TONSILS/PHARYNGEAL WALLS/HYPOPHARYNX - no erythema or exudates  NASOPHARYNX - limited mirror exam - unable to visualize due to anatomy/gag  LARYNX -  - limited mirror exam - unable to visualize due to anatomy/gag      CHEST AND LUNG   INSPECTION & AUSCULTATION - normal effort, no stridor    CARDIOVASCULAR  AUSCULTATION & PERIPHERAL VASCULAR - regular rate and rhythm.    NEUROLOGIC  MENTAL STATUS - alert, interactive CRANIAL NERVES - normal    LYMPHATIC  HEAD AND NECK - non-palpable; SUPRACLAVICULAR - deferred      Procedure(s):  None          Assessment:      Problem List Items Addressed This Visit    None  Visit Diagnoses       Chronic eustachian salpingitis, bilateral    -  Primary    History of myringotomy        Deviated septum        Smoking        Chronic sinusitis, unspecified location        Piter's edema of vocal folds        Hearing loss, unspecified hearing loss type, unspecified laterality        Chronic non-infective otitis externa of both ears, unspecified type                         Plan:      Derm otic followed by mineral oil.  Routine instructions and instructions on derm otic provided for itching of the ears.  Printed external audiogram request to be completed somewhere closer to home prior to follow up in 6 weeks.      Nasal sinus care again outlined.      Laryngoscopy on return to reassess Piter's edema and watch for  any suspicious change not appreciated on previous exam.      Voice recognition software was used in the creation of this note/communication and any sound-alike errors which may have occurred from its use should be taken in context when interpreting.  If such errors prevent a clear understanding of the note/communication, please contact the office for clarification.

## 2024-09-27 NOTE — PATIENT INSTRUCTIONS
DERMOTIC/FLUOCINOLONE OIL    Use prescribed fluocinolone/derm otic oil to both ears smearing around the outer ear scaling areas as well as down in the ear canal twice daily for a week no more than 2. At this point follow a routine ear care as outlined.  If not improved with this dose of steroid a higher concentration steroid may prove necessary. If medication is not well covered by insurance consider using good Rx which often bring surprise way down.      ROUTINE EAR CARE    Keep the ears dry in general.  Water and soap dry the ears increases scaling by stripping away the natural oils of the ears.    A twisted single ply of facial tissue can be used to wick out moisture on the rare occasion when water gets stuck in the ear; or the water can be displaced with concentrated alcohol like OTC SwimEar or a few drops of 72-95% isopropyl alcohol to fill the ear canal.  Regular use will cause excess drying of the ears.    The ear should be kept moist in general with mineral oil. Three to four drops into the ear canal 2 to 3 times a week or even every night.    If the ears need to be irrigated use either a 50 50 mixture of white vinegar and distilled water or 50 50 mixture of alcohol and white vinegar.    Painful draining ears that do not resolve with conservative care could represent infection and may need microscopic office debridement/clearing of the wax, and topical antibiotic drops with or without steroids may need to be prescribed.        NASAL SALINE    Still saline comes in many preparations including sprays/mists, gels, and rinses.  Different preparations served different purposes.  Saline spray helps to briefly moisturize the nose and help clear mucus.  Saline gels coat the nose for longer protective benefit of keeping the linings the nose moist.  Saline rinses clear the nose and sinuses and a more thorough way in her best used for significant postnasal drip and sinus complaints.  A combination of saline  sprays/mists, gels and rinses should be used to address routine nasal clearing and dryness issues as well as flushing for better control of allergy and postnasal drip symptoms.  There is no real risk of over use of nasal saline products.  Saline sprays do not have any of the potential rebound or addiction of nasal decongestant sprays.  Nasal saline sprays and rinses should be used prior to the application of any medicated nasal sprays such as nasal steroids or nasal antihistamine sprays.        INTRANASAL STEROID SPRAYS      Intranasal steroid sprays are available both by prescription and over-the-counter both in generic and brand name preparations.  They are all very similar in efficacy and side effect profiles.  Over-the-counter and prescription intranasal steroids include fluticasone propionate (Flonase), fluticasone furoate (Sensimist), triamcinolone (Nasacort), and budesonide (Rhinocort).  While these medications are available as prescriptions as well there are few nasal steroids in her available by prescription only and include mometasone (Nasonex), flunisolide (Nasarel), and beclomethasone (QNASL).    Nasal steroids or the foundation of treatment of both allergy and other inflammatory conditions of the nose and sinuses.  They are safe for regular use and while there are many side effects listed most of these are steroid class effects and not typically encountered.  Typical side effects include dryness and even ulceration and bleeding of the nose.  These side effects can be minimized by proper application and proper moisturization with saline and saline gel.    Sometimes changing between 1 brand of nasal steroid and another can result in improved control of symptoms especially after long term use of one particular nasal steroid.    Proper application of the nasal steroid spray is accomplished by spraying towards the I/ear on the same side with the tip of the superior just barely inside the nostril with the  chin slightly downward.  Any dripping should be gently inhaled not sniff test backwards into the throat.  Labeled instructions should be followed.    SINUS RINSE INSTRUCTIONS    Nasal Saline Irrigation Instructions  You can wash your nasal and sinus passages using nasal saline (salt water) irrigation. This   is simple and effective. Follow the instructions below, as well as the ones provided by your   physician.  Supplies  First, you will need a nasal saline irrigation bottle and rinse solution.   You can purchase nasal rinse kits that include these items (such as   NeilMed®, Ayr®, Simply Saline®, Ocean Complete®) at most drug   stores. You can also make your own saline irrigation solution by   adding kosher (non-iodine) salt and baking soda to distilled water.   Your physician may tell you to add medications like a steroid or   antibiotic to the rinse as needed.  Steps for nasal irrigation  Step 1. Fill the bottle  ? Wash your hands.  ? Fill the irrigation bottle with lukewarm distilled water or boiled water that has cooled.  Step 2. Mix the solution  ? Put the saline and salt packet contents into the bottle.  ? Tighten the top of the bottle and shake it gently to dissolve the mixture.  ? If you are making your own solution:   - Add 1/4 to 1/2 teaspoon of baking soda and 1/8 teaspoon of kosher (non-iodine) salt   into the bottle.   - Tighten the top of the bottle.   - Shake the bottle gently to dissolve the mixture.  Step 3. Get into position  ?  front of the sink.  ? Unless you were instructed to use another position, bend forward.   Then tilt your face down about 45 degrees so that you are looking   down into the sink.  ? Gently place the spout of the saline bottle against 1 of your nostrils.  [facility]  CARE AND TREATMENT  Patient Education  ©2018 [employer]  ©2018 [employer]  Step 4. Rinse  ? Breathing through your mouth, gently squeeze the    bottle. This will squirt the solution into your nostril. The   solution will start to drain from the other nostril. Some   may drain from your mouth. This is normal.  ? Use 2 ounces (half of the bottle) on each nostril.  ? Afterwards, you may need to blow your nose gently to   help drain any solution that is left behind.  Step 5. Repeat  ? Repeat steps 3 and 4 with the other nostril.  You can watch a video to learn how to do nasal saline irrigation. Go to [employer] and   search for NeilMed Sinus Rinse.  Step 6.  Clean the bottle and cap. Air dry the Sinus Rinse bottle, cap, and tube on a clean paper towel, a lint free towel, or use NeilMed® NasaDOCK® or NasaDOCK plus (sold separately) to store the bottle, cap and tube.  Please read Warnings before using.  Our recommendation is to replace the bottle every three months.      NEILMED CLEAING INSTRUCTIONS    It is very important to keep these devices clean and free from any contamination. Replace the bottle every 3 months.  NasaDock Plus  NasaDock NeilMed® SINUS RINSE Squeeze Bottle: Please perform routine inspections of the bottle and tube for any discolorations and cracks. If there are any visual signs of deterioration or permanent color changes, please clean thoroughly. If the discolorations remain after cleansing, discard the items and purchase new ones. Please follow these instructions after each use of the product. Be sure to replace your product after three months.  Step 1: Rinse the cap, tube and bottle using running water. Fill the bottle with distilled, micro-filtered (through 0.2 micron), reverse osmosis filtered, commercially bottled or previously boiled and cooled down water at lukewarm or body temperature..  Step 2: Add a few drops of dish washing liquid or baby shampoo.  Step 3: Attach the cap and tube to the bottle; hold your finger over the opening in the cap and shake the bottle vigorously.  Step 4: Squeeze the bottle hard to allow the soapy  solution to clean the interior of the tube and the cap. Empty out the bottle completely.  Step 5: Rinse the soap from the bottle, cap and tube thoroughly and place the items on a clean paper towel to dry or use the preferred NasaDOCK® or NasaDOCK plus.    The NasaDOCK® is a simple, hygienic way to dry and store the SINUS RINSE bottle, cap and tube. NasaDOCK® comes with various hanging options and is available in different colors. Our newest model also offers storage for our SINUS RINSE mixture packets. We strongly suggest using NasaDOCK® as an inexpensive, easy way to dry the cap, tube and SINUS RINSE bottle.        Cleaning:  Do not use a  to clean the inside of a bottle. While our bottle is  safe, a  will not adequately clean the SINUS RINSE bottle. The water jets in dishwashers cannot enter the narrow neck of the bottle, and portions of the bottles interior will not be cleaned thoroughly. Additional methods of cleaning the bottle include the use of concentrated white vinegar or isopropyl alcohol (70% concentration), followed by scrubbing and rinsing as described above.       Microwave Disinfection  Clean the device with soap and water as mentioned above and shake off the excess water. Now place the bottle, cap and tube in the microwave for 40 seconds. This will disinfect the bottle, cap and tube. If the microwave has been used recently, please make sure that the inside of the microwave has cooled back down to room temperature before using it to disinfect the bottle.    NeilMed NasaFlo® Neti Pot Users:  Use the same procedure as above.    Sinugator® Cleaning Directions:  Clean the Sinugator® by running plain water and dry with a clean lint free towel and then air dry the unit by keeping it open to the air. The nasal  tip, blue reservoir and white soft tube can be disinfected by cleaning with soap and water and shaking off the excess water before placing in the  home microwave for 60 seconds. Clean the entire unit with a few drops of dishwashing liquid and water every three days to keep the unit clean. As a fi nal rinse to wash off any residual soap or tap water, use either distilled, micro-filtered (through 0.2 micron filter), commercially bottled or previously boiled & cooled down water. Please make sure to rinse thoroughly during each wash so no soap is left behind. DO NOT place the white motor unit in microwave for disinfection. Because of the units stainless steel components, this can cause damage or fire hazards.    General Principles of Maintenance & Storage:  When permissible use a microwave periodically to disinfect devices. Always store NeilMed® products in a cool and dry place with adequate ventilation. NasaDOCK® or NasaDOCK plus offer a simple hygienic way to air dry & neatly store the bottle, cap, tube and NasaFlo. Do not store the bottle with the cap screwed on, unless both are dry. Do not store the wet parts in a sealed plastic bag. If you travel before they are dry, wrap parts separately in paper towels. Hand soap or shampoo can be used for cleaning parts while away from home.        USE ONLY AS DIRECTED, IF SYMPTOMS PERSIST SEE YOUR DOCTOR/HEALTHCARE PROFESSIONAL. ALWAYS READ THE LABEL.

## 2024-10-11 ENCOUNTER — PATIENT MESSAGE (OUTPATIENT)
Dept: ADMINISTRATIVE | Facility: HOSPITAL | Age: 44
End: 2024-10-11
Payer: MEDICAID

## 2024-12-06 DIAGNOSIS — E11.9 NEW ONSET TYPE 2 DIABETES MELLITUS: Primary | ICD-10-CM

## 2024-12-14 RX ORDER — METFORMIN HYDROCHLORIDE 500 MG/1
500 TABLET, EXTENDED RELEASE ORAL 2 TIMES DAILY WITH MEALS
Qty: 60 TABLET | Refills: 0 | Status: SHIPPED | OUTPATIENT
Start: 2024-12-14

## 2025-01-03 DIAGNOSIS — E78.5 HYPERLIPIDEMIA, UNSPECIFIED HYPERLIPIDEMIA TYPE: ICD-10-CM

## 2025-01-03 RX ORDER — ROSUVASTATIN CALCIUM 40 MG/1
TABLET, COATED ORAL
Qty: 30 TABLET | Refills: 5 | Status: SHIPPED | OUTPATIENT
Start: 2025-01-03

## 2025-01-15 ENCOUNTER — HOSPITAL ENCOUNTER (EMERGENCY)
Facility: HOSPITAL | Age: 45
Discharge: HOME | End: 2025-01-15
Attending: FAMILY MEDICINE
Payer: MEDICAID

## 2025-01-15 VITALS
DIASTOLIC BLOOD PRESSURE: 95 MMHG | OXYGEN SATURATION: 100 % | RESPIRATION RATE: 18 BRPM | BODY MASS INDEX: 37.3 KG/M2 | HEIGHT: 60 IN | HEART RATE: 70 BPM | WEIGHT: 190 LBS | TEMPERATURE: 98 F | SYSTOLIC BLOOD PRESSURE: 148 MMHG

## 2025-01-15 DIAGNOSIS — R10.9 ABDOMINAL PAIN, UNSPECIFIED ABDOMINAL LOCATION: Primary | ICD-10-CM

## 2025-01-15 LAB
ALBUMIN SERPL BCP-MCNC: 4.3 G/DL (ref 3.5–5.2)
ALP SERPL-CCNC: 78 U/L (ref 55–135)
ALT SERPL W/O P-5'-P-CCNC: 22 U/L (ref 10–44)
ANION GAP SERPL CALC-SCNC: 10 MMOL/L (ref 8–16)
AST SERPL-CCNC: 28 U/L (ref 10–40)
BACTERIA #/AREA URNS HPF: ABNORMAL /HPF
BASOPHILS # BLD AUTO: 0.05 K/UL (ref 0–0.2)
BASOPHILS NFR BLD: 0.6 % (ref 0–1.9)
BILIRUB SERPL-MCNC: 0.4 MG/DL (ref 0.1–1)
BILIRUB UR QL STRIP: NEGATIVE
BUN SERPL-MCNC: 7 MG/DL (ref 6–20)
CALCIUM SERPL-MCNC: 9.8 MG/DL (ref 8.7–10.5)
CHLORIDE SERPL-SCNC: 104 MMOL/L (ref 95–110)
CLARITY UR: CLEAR
CO2 SERPL-SCNC: 29 MMOL/L (ref 23–29)
COLOR UR: YELLOW
CREAT SERPL-MCNC: 0.9 MG/DL (ref 0.5–1.4)
DIFFERENTIAL METHOD BLD: ABNORMAL
EOSINOPHIL # BLD AUTO: 0.2 K/UL (ref 0–0.5)
EOSINOPHIL NFR BLD: 1.9 % (ref 0–8)
ERYTHROCYTE [DISTWIDTH] IN BLOOD BY AUTOMATED COUNT: 13 % (ref 11.5–14.5)
EST. GFR  (NO RACE VARIABLE): >60 ML/MIN/1.73 M^2
GLUCOSE SERPL-MCNC: 114 MG/DL (ref 70–110)
GLUCOSE UR QL STRIP: NEGATIVE
HCT VFR BLD AUTO: 42.8 % (ref 37–48.5)
HGB BLD-MCNC: 14.6 G/DL (ref 12–16)
HGB UR QL STRIP: ABNORMAL
HYALINE CASTS #/AREA URNS LPF: 0.7 /LPF
IMM GRANULOCYTES # BLD AUTO: 0.01 K/UL (ref 0–0.04)
IMM GRANULOCYTES NFR BLD AUTO: 0.1 % (ref 0–0.5)
KETONES UR QL STRIP: NEGATIVE
LEUKOCYTE ESTERASE UR QL STRIP: ABNORMAL
LIPASE SERPL-CCNC: 19 U/L (ref 4–60)
LYMPHOCYTES # BLD AUTO: 3 K/UL (ref 1–4.8)
LYMPHOCYTES NFR BLD: 36.4 % (ref 18–48)
MCH RBC QN AUTO: 33.2 PG (ref 27–31)
MCHC RBC AUTO-ENTMCNC: 34.1 G/DL (ref 32–36)
MCV RBC AUTO: 97 FL (ref 82–98)
MICROSCOPIC COMMENT: ABNORMAL
MONOCYTES # BLD AUTO: 0.5 K/UL (ref 0.3–1)
MONOCYTES NFR BLD: 6.5 % (ref 4–15)
NEUTROPHILS # BLD AUTO: 4.5 K/UL (ref 1.8–7.7)
NEUTROPHILS NFR BLD: 54.5 % (ref 38–73)
NITRITE UR QL STRIP: NEGATIVE
NRBC BLD-RTO: 0 /100 WBC
PH UR STRIP: 7 [PH] (ref 5–8)
PLATELET # BLD AUTO: 203 K/UL (ref 150–450)
PMV BLD AUTO: 8.7 FL (ref 9.2–12.9)
POTASSIUM SERPL-SCNC: 4 MMOL/L (ref 3.5–5.1)
PROT SERPL-MCNC: 8 G/DL (ref 6–8.4)
PROT UR QL STRIP: NEGATIVE
RBC # BLD AUTO: 4.4 M/UL (ref 4–5.4)
RBC #/AREA URNS HPF: 2 /HPF (ref 0–4)
SODIUM SERPL-SCNC: 143 MMOL/L (ref 136–145)
SP GR UR STRIP: 1.01 (ref 1–1.03)
SQUAMOUS #/AREA URNS HPF: 8 /HPF
URN SPEC COLLECT METH UR: ABNORMAL
UROBILINOGEN UR STRIP-ACNC: NEGATIVE EU/DL
WBC # BLD AUTO: 8.29 K/UL (ref 3.9–12.7)
WBC #/AREA URNS HPF: 10 /HPF (ref 0–5)

## 2025-01-15 PROCEDURE — 36415 COLL VENOUS BLD VENIPUNCTURE: CPT | Performed by: CLINICAL NURSE SPECIALIST

## 2025-01-15 PROCEDURE — 85025 COMPLETE CBC W/AUTO DIFF WBC: CPT | Performed by: CLINICAL NURSE SPECIALIST

## 2025-01-15 PROCEDURE — 81000 URINALYSIS NONAUTO W/SCOPE: CPT | Performed by: CLINICAL NURSE SPECIALIST

## 2025-01-15 PROCEDURE — 25000003 PHARM REV CODE 250: Performed by: CLINICAL NURSE SPECIALIST

## 2025-01-15 PROCEDURE — 83690 ASSAY OF LIPASE: CPT | Performed by: CLINICAL NURSE SPECIALIST

## 2025-01-15 PROCEDURE — 80053 COMPREHEN METABOLIC PANEL: CPT | Performed by: CLINICAL NURSE SPECIALIST

## 2025-01-15 PROCEDURE — 99284 EMERGENCY DEPT VISIT MOD MDM: CPT | Mod: 25

## 2025-01-15 RX ORDER — HYDROCODONE BITARTRATE AND ACETAMINOPHEN 5; 325 MG/1; MG/1
1 TABLET ORAL
Status: COMPLETED | OUTPATIENT
Start: 2025-01-15 | End: 2025-01-15

## 2025-01-15 RX ORDER — ONDANSETRON 4 MG/1
4 TABLET, ORALLY DISINTEGRATING ORAL
Status: COMPLETED | OUTPATIENT
Start: 2025-01-15 | End: 2025-01-15

## 2025-01-15 RX ORDER — ONDANSETRON 4 MG/1
4 TABLET, ORALLY DISINTEGRATING ORAL EVERY 6 HOURS PRN
Qty: 10 TABLET | Refills: 0 | Status: SHIPPED | OUTPATIENT
Start: 2025-01-15

## 2025-01-15 RX ORDER — LOPERAMIDE HYDROCHLORIDE 2 MG/1
2 CAPSULE ORAL 4 TIMES DAILY PRN
Qty: 12 CAPSULE | Refills: 0 | Status: SHIPPED | OUTPATIENT
Start: 2025-01-15 | End: 2025-01-25

## 2025-01-15 RX ORDER — DICYCLOMINE HYDROCHLORIDE 10 MG/1
10 CAPSULE ORAL 3 TIMES DAILY PRN
Qty: 21 CAPSULE | Refills: 0 | Status: SHIPPED | OUTPATIENT
Start: 2025-01-15

## 2025-01-15 RX ADMIN — ONDANSETRON 4 MG: 4 TABLET, ORALLY DISINTEGRATING ORAL at 12:01

## 2025-01-15 RX ADMIN — HYDROCODONE BITARTRATE AND ACETAMINOPHEN 1 TABLET: 5; 325 TABLET ORAL at 12:01

## 2025-01-15 NOTE — ED NOTES
NEUROLOGICAL:   Patient is awake , alert , and oriented x 4 .   Moves all extremities without difficulty.   Patient reports no neuro complaints..  GCS 15    CARDIOVASCULAR:   S1 and S2 present, no murmurs, gallops, or rubs, rate regular , and pulses palpable (2+)    On palpation no edema noted , noted to none.   Patient reports no CV complaints.  .     RESPIRATORY:   Airway Clear, Open, and Patent.  Respirations are even and unlabored.   Breath sounds clear  to all lung fields.   Patient reports no respiratory complaints.     GASTROINTESTINAL:   Abdomen is soft  and non-tender x 4 quadrants. Bowel sounds are normoactive to all quadrants .   Patient reports nausea and vomiting . Abd pain    SKIN:   Skin appears warm , dry , good turgor, color normal for race, and intact.

## 2025-01-15 NOTE — ED PROVIDER NOTES
Encounter Date: 1/15/2025       History     Chief Complaint   Patient presents with    Abdominal Pain     Pt stated that she has been experiencing upper abdominal pain with n/v/d for the past week.      44-year-old female presents emergency room with epigastric abdominal pain with nausea, vomiting for the last week.  Patient denies any diarrhea or bloody stools.  Patient has a history of Crohn's        Review of patient's allergies indicates:   Allergen Reactions    Clindamycin Swelling     Tongue swellong    Iodine and iodide containing products Nausea And Vomiting    Aspirin      Pt reports vomiting blood after taking.    Dayhist allergy [clemastine]      Other reaction(s): numbness to head    Flu vac  [id number](pf)      Other Reaction(s): nausea and vomiting    Wellbutrin [bupropion hcl]      Makes angry     Past Medical History:   Diagnosis Date    ADHD (attention deficit hyperactivity disorder)     Allergies     Anxiety     Arthritis     Autism     Bipolar disorder     Chronic nausea     Crohn's colitis     Depression     Heart murmur     High cholesterol     History of rectal surgery     Hypertension     Hypothyroidism     Neuropathy     Pneumonia due to COVID-19 virus 2021     Past Surgical History:   Procedure Laterality Date    APPENDECTOMY       SECTION      CHOLECYSTECTOMY      COLONOSCOPY N/A 2020    Procedure: COLONOSCOPY;  Surgeon: Brittnee Dueñas MD;  Location: [facility];  Service: Endoscopy;  Laterality: N/A;    COLONOSCOPY N/A 2021    Procedure: COLONOSCOPY;  Surgeon: Brittnee Dueñas MD;  Location: [facility];  Service: Endoscopy;  Laterality: N/A;  needs rapid COVID    COLONOSCOPY N/A 2024    Procedure: COLONOSCOPY;  Surgeon: Shanell Mendoza MD;  Location: [facility];  Service: Endoscopy;  Laterality: N/A;    KNEE ARTHROSCOPY W/ MENISCECTOMY Right 2024    Procedure: ARTHROSCOPY, KNEE, WITH MENISCECTOMY;  Surgeon: Jimenez Sandoval MD;  Location:  [facility];  Service: Orthopedics;  Laterality: Right;    NASAL FRACTURE SURGERY      tubes in ears 6 times       Family History   Problem Relation Name Age of Onset    Breast cancer Mother      Ovarian cancer Mother      Lung cancer Mother      No Known Problems Father      No Known Problems Sister 3     No Known Problems Daughter 1     No Known Problems Maternal Aunt      No Known Problems Maternal Uncle      No Known Problems Paternal Aunt      No Known Problems Paternal Uncle      No Known Problems Maternal Grandmother      No Known Problems Maternal Grandfather      No Known Problems Paternal Grandmother      No Known Problems Paternal Grandfather      BRCA 1/2 Neg Hx       Social History     Tobacco Use    Smoking status: Every Day     Current packs/day: 1.00     Average packs/day: 1 pack/day for 24.4 years (24.4 ttl pk-yrs)     Types: Cigarettes     Start date: 8/16/2000     Passive exposure: Current    Smokeless tobacco: Never   Substance Use Topics    Alcohol use: Never    Drug use: Yes     Types: Marijuana     Comment: occ     Review of Systems   Constitutional:  Negative for fever.   HENT:  Negative for sore throat.    Respiratory:  Negative for shortness of breath.    Cardiovascular:  Negative for chest pain.   Gastrointestinal:  Positive for abdominal pain, nausea and vomiting.   Genitourinary:  Negative for dysuria.   Musculoskeletal:  Negative for back pain.   Skin:  Negative for rash.   Neurological:  Negative for weakness.   Hematological:  Does not bruise/bleed easily.   All other systems reviewed and are negative.      Physical Exam     Initial Vitals [01/15/25 1211]   BP Pulse Resp Temp SpO2   (!) 148/95 72 18 98.1 °F (36.7 °C) 96 %      MAP       --         Physical Exam    Nursing note and vitals reviewed.  Constitutional: She appears well-developed and well-nourished.   HENT:   Head: Normocephalic and atraumatic.   Eyes: Pupils are equal, round, and reactive to light.   Neck:   Normal range of  motion.  Cardiovascular:  Normal rate and regular rhythm.           Pulmonary/Chest: Breath sounds normal.   Abdominal: Abdomen is soft. Bowel sounds are normal. There is abdominal tenderness.   Musculoskeletal:         General: Normal range of motion.      Cervical back: Normal range of motion.     Neurological: She is alert and oriented to person, place, and time.   Skin: Skin is warm and dry.   Psychiatric: She has a normal mood and affect.         ED Course   Procedures  Labs Reviewed   CBC W/ AUTO DIFFERENTIAL - Abnormal       Result Value    WBC 8.29      RBC 4.40      Hemoglobin 14.6      Hematocrit 42.8      MCV 97      MCH 33.2 (*)     MCHC 34.1      RDW 13.0      Platelets 203      MPV 8.7 (*)     Immature Granulocytes 0.1      Gran # (ANC) 4.5      Immature Grans (Abs) 0.01      Lymph # 3.0      Mono # 0.5      Eos # 0.2      Baso # 0.05      nRBC 0      Gran % 54.5      Lymph % 36.4      Mono % 6.5      Eosinophil % 1.9      Basophil % 0.6      Differential Method Automated     COMPREHENSIVE METABOLIC PANEL - Abnormal    Sodium 143      Potassium 4.0      Chloride 104      CO2 29      Glucose 114 (*)     BUN 7      Creatinine 0.9      Calcium 9.8      Total Protein 8.0      Albumin 4.3      Total Bilirubin 0.4      Alkaline Phosphatase 78      AST 28      ALT 22      eGFR >60.0      Anion Gap 10     URINALYSIS, REFLEX TO URINE CULTURE - Abnormal    Specimen UA Urine, Clean Catch      Color, UA Yellow      Appearance, UA Clear      pH, UA 7.0      Specific Gravity, UA 1.010      Protein, UA Negative      Glucose, UA Negative      Ketones, UA Negative      Bilirubin (UA) Negative      Occult Blood UA 2+ (*)     Nitrite, UA Negative      Urobilinogen, UA Negative      Leukocytes, UA Trace (*)     Narrative:     Preferred Collection Type->Urine, Clean Catch  Specimen Source->Urine   URINALYSIS MICROSCOPIC - Abnormal    RBC, UA 2      WBC, UA 10 (*)     Bacteria Occasional      Squam Epithel, UA 8       Hyaline Casts, UA 0.7 (*)     Microscopic Comment SEE COMMENT      Narrative:     Preferred Collection Type->Urine, Clean Catch  Specimen Source->Urine   LIPASE    Lipase 19            Imaging Results              CT Abdomen Pelvis  Without Contrast (Final result)  Result time 01/15/25 13:46:43      Final result by Dong Witt MD (01/15/25 13:46:43)                   Impression:      No renal stones or hydronephrosis.    Hepatomegaly with steatosis.    No acute findings.      Electronically signed by: Dong Witt MD  Date:    01/15/2025  Time:    13:46               Narrative:    EXAMINATION:  CT ABDOMEN PELVIS WITHOUT CONTRAST    CLINICAL HISTORY:  Flank pain, kidney stone suspected;    TECHNIQUE:  Axial CT images were obtained. Iterative reconstruction technique was used. CT/cardiac nuclear exam/s in prior 12 months: 0.    COMPARISON:  CT abdomen pelvis with IV contrast 10/12/2022.    FINDINGS:  Hepatomegaly with steatosis.  Cholecystectomy.  The spleen, pancreas, demonstrate no abnormality.  Benign stable bilateral adrenal adenomas.  There is no right renal stone or hydronephrosis.  No left renal stone or hydronephrosis.  No gross gastric abnormality.  No dilated bowel.  Appendectomy.  No free fluid or free air.  No abnormality of urinary bladder.  Uterus and adnexa appear unremarkable.  There is no aortic aneurysm.  No lymph node enlargement.  Visualized lung bases are clear.  No osseous abnormality.                                       Medications   HYDROcodone-acetaminophen 5-325 mg per tablet 1 tablet (1 tablet Oral Given 1/15/25 1244)   ondansetron disintegrating tablet 4 mg (4 mg Oral Given 1/15/25 1244)     Medical Decision Making  Amount and/or Complexity of Data Reviewed  Labs: ordered.  Radiology: ordered.    Risk  Prescription drug management.                                      Clinical Impression:  Final diagnoses:  [R10.9] Abdominal pain, unspecified abdominal location (Primary)           ED Disposition Condition    Discharge Stable          ED Prescriptions       Medication Sig Dispense Start Date End Date Auth. Provider    dicyclomine (BENTYL) 10 MG capsule Take 1 capsule (10 mg total) by mouth 3 (three) times daily as needed (abdominal pain). 21 capsule 1/15/2025 -- Ella Heck NP    ondansetron (ZOFRAN-ODT) 4 MG TbDL Take 1 tablet (4 mg total) by mouth every 6 (six) hours as needed. 10 tablet 1/15/2025 -- Ella Heck NP    loperamide (IMODIUM) 2 mg capsule Take 1 capsule (2 mg total) by mouth 4 (four) times daily as needed for Diarrhea. 12 capsule 1/15/2025 1/25/2025 Ella Heck NP          Follow-up Information       Follow up With Specialties Details Why Contact Info    [place], DO Family Medicine  As needed [address]   [address]  [facility] [address]  [phone]               Ella Heck NP  01/15/25 7009

## 2025-01-23 ENCOUNTER — PATIENT MESSAGE (OUTPATIENT)
Dept: ADMINISTRATIVE | Facility: HOSPITAL | Age: 45
End: 2025-01-23
Payer: MEDICAID

## 2025-01-30 ENCOUNTER — OFFICE VISIT (OUTPATIENT)
Dept: PRIMARY CARE CLINIC | Facility: CLINIC | Age: 45
End: 2025-01-30
Payer: MEDICAID

## 2025-01-30 VITALS
BODY MASS INDEX: 37.5 KG/M2 | WEIGHT: 191 LBS | HEIGHT: 60 IN | HEART RATE: 77 BPM | SYSTOLIC BLOOD PRESSURE: 102 MMHG | OXYGEN SATURATION: 95 % | DIASTOLIC BLOOD PRESSURE: 57 MMHG | TEMPERATURE: 98 F

## 2025-01-30 DIAGNOSIS — F17.210 CIGARETTE NICOTINE DEPENDENCE WITHOUT COMPLICATION: ICD-10-CM

## 2025-01-30 DIAGNOSIS — R01.1 HEART MURMUR: ICD-10-CM

## 2025-01-30 DIAGNOSIS — Z13.0 SCREENING FOR IRON DEFICIENCY ANEMIA: ICD-10-CM

## 2025-01-30 DIAGNOSIS — R04.0 EPISTAXIS: ICD-10-CM

## 2025-01-30 DIAGNOSIS — E11.9 ENCOUNTER FOR DIABETIC FOOT EXAM: ICD-10-CM

## 2025-01-30 DIAGNOSIS — R10.84 GENERALIZED ABDOMINAL PAIN: ICD-10-CM

## 2025-01-30 DIAGNOSIS — D35.00 ADRENAL ADENOMA, UNSPECIFIED LATERALITY: ICD-10-CM

## 2025-01-30 DIAGNOSIS — F12.90 MARIJUANA USE: ICD-10-CM

## 2025-01-30 DIAGNOSIS — Z11.4 ENCOUNTER FOR SCREENING FOR HIV: ICD-10-CM

## 2025-01-30 DIAGNOSIS — E55.9 VITAMIN D DEFICIENCY: ICD-10-CM

## 2025-01-30 DIAGNOSIS — G62.9 NEUROPATHY: ICD-10-CM

## 2025-01-30 DIAGNOSIS — Z76.89 ENCOUNTER TO ESTABLISH CARE: Primary | ICD-10-CM

## 2025-01-30 DIAGNOSIS — F84.0 AUTISM: ICD-10-CM

## 2025-01-30 DIAGNOSIS — E03.9 HYPOTHYROIDISM, UNSPECIFIED TYPE: ICD-10-CM

## 2025-01-30 DIAGNOSIS — E78.5 HYPERLIPIDEMIA, UNSPECIFIED HYPERLIPIDEMIA TYPE: ICD-10-CM

## 2025-01-30 DIAGNOSIS — E11.9 NEW ONSET TYPE 2 DIABETES MELLITUS: ICD-10-CM

## 2025-01-30 DIAGNOSIS — M19.90 ARTHRITIS: ICD-10-CM

## 2025-01-30 DIAGNOSIS — F31.9 BIPOLAR AFFECTIVE DISORDER, REMISSION STATUS UNSPECIFIED: ICD-10-CM

## 2025-01-30 PROBLEM — F90.9 ADHD (ATTENTION DEFICIT HYPERACTIVITY DISORDER): Chronic | Status: RESOLVED | Noted: 2024-02-05 | Resolved: 2025-01-30

## 2025-01-30 LAB
ALBUMIN SERPL BCP-MCNC: 4.2 G/DL (ref 3.5–5.2)
ALBUMIN/CREAT UR: NORMAL UG/MG (ref 0–30)
ALP SERPL-CCNC: 93 U/L (ref 55–135)
ALT SERPL W/O P-5'-P-CCNC: 14 U/L (ref 10–44)
ANION GAP SERPL CALC-SCNC: 10 MMOL/L (ref 8–16)
AST SERPL-CCNC: 22 U/L (ref 10–40)
BACTERIA #/AREA URNS HPF: ABNORMAL /HPF
BASOPHILS # BLD AUTO: 0.05 K/UL (ref 0–0.2)
BASOPHILS NFR BLD: 0.7 % (ref 0–1.9)
BILIRUB SERPL-MCNC: 0.2 MG/DL (ref 0.1–1)
BILIRUB UR QL STRIP: NEGATIVE
BUN SERPL-MCNC: 11 MG/DL (ref 6–20)
CALCIUM SERPL-MCNC: 10 MG/DL (ref 8.7–10.5)
CHLORIDE SERPL-SCNC: 106 MMOL/L (ref 95–110)
CHOLEST SERPL-MCNC: 156 MG/DL (ref 120–199)
CHOLEST/HDLC SERPL: 3.1 {RATIO} (ref 2–5)
CLARITY UR: CLEAR
CO2 SERPL-SCNC: 24 MMOL/L (ref 23–29)
COLOR UR: YELLOW
CREAT SERPL-MCNC: 0.9 MG/DL (ref 0.5–1.4)
CREAT UR-MCNC: 42 MG/DL (ref 15–325)
DIFFERENTIAL METHOD BLD: ABNORMAL
EOSINOPHIL # BLD AUTO: 0.2 K/UL (ref 0–0.5)
EOSINOPHIL NFR BLD: 2.7 % (ref 0–8)
ERYTHROCYTE [DISTWIDTH] IN BLOOD BY AUTOMATED COUNT: 13.2 % (ref 11.5–14.5)
EST. GFR  (NO RACE VARIABLE): >60 ML/MIN/1.73 M^2
ESTIMATED AVG GLUCOSE: 117 MG/DL (ref 68–131)
GLUCOSE SERPL-MCNC: 84 MG/DL (ref 70–110)
GLUCOSE UR QL STRIP: NEGATIVE
HBA1C MFR BLD: 5.7 % (ref 4–5.6)
HCT VFR BLD AUTO: 44 % (ref 37–48.5)
HDLC SERPL-MCNC: 51 MG/DL (ref 40–75)
HDLC SERPL: 32.7 % (ref 20–50)
HGB BLD-MCNC: 14.9 G/DL (ref 12–16)
HGB UR QL STRIP: ABNORMAL
HIV 1+2 AB+HIV1 P24 AG SERPL QL IA: NORMAL
HYALINE CASTS #/AREA URNS LPF: 1.1 /LPF
IMM GRANULOCYTES # BLD AUTO: 0.01 K/UL (ref 0–0.04)
IMM GRANULOCYTES NFR BLD AUTO: 0.1 % (ref 0–0.5)
KETONES UR QL STRIP: NEGATIVE
LDLC SERPL CALC-MCNC: 79.2 MG/DL (ref 63–159)
LEUKOCYTE ESTERASE UR QL STRIP: ABNORMAL
LYMPHOCYTES # BLD AUTO: 2.4 K/UL (ref 1–4.8)
LYMPHOCYTES NFR BLD: 33.3 % (ref 18–48)
MCH RBC QN AUTO: 33.2 PG (ref 27–31)
MCHC RBC AUTO-ENTMCNC: 33.9 G/DL (ref 32–36)
MCV RBC AUTO: 98 FL (ref 82–98)
MICROALBUMIN UR DL<=1MG/L-MCNC: <5 UG/ML
MICROSCOPIC COMMENT: ABNORMAL
MONOCYTES # BLD AUTO: 0.6 K/UL (ref 0.3–1)
MONOCYTES NFR BLD: 8.6 % (ref 4–15)
NEUTROPHILS # BLD AUTO: 3.9 K/UL (ref 1.8–7.7)
NEUTROPHILS NFR BLD: 54.6 % (ref 38–73)
NITRITE UR QL STRIP: NEGATIVE
NONHDLC SERPL-MCNC: 105 MG/DL
NRBC BLD-RTO: 0 /100 WBC
PH UR STRIP: 7 [PH] (ref 5–8)
PLATELET # BLD AUTO: 228 K/UL (ref 150–450)
PMV BLD AUTO: 9.6 FL (ref 9.2–12.9)
POTASSIUM SERPL-SCNC: 3.9 MMOL/L (ref 3.5–5.1)
PROT SERPL-MCNC: 7.7 G/DL (ref 6–8.4)
PROT UR QL STRIP: NEGATIVE
RBC # BLD AUTO: 4.49 M/UL (ref 4–5.4)
RBC #/AREA URNS HPF: 2 /HPF (ref 0–4)
SODIUM SERPL-SCNC: 140 MMOL/L (ref 136–145)
SP GR UR STRIP: 1.01 (ref 1–1.03)
SQUAMOUS #/AREA URNS HPF: 8 /HPF
TRIGL SERPL-MCNC: 129 MG/DL (ref 30–150)
TSH SERPL DL<=0.005 MIU/L-ACNC: 2.27 UIU/ML (ref 0.4–4)
URN SPEC COLLECT METH UR: ABNORMAL
UROBILINOGEN UR STRIP-ACNC: NEGATIVE EU/DL
WBC # BLD AUTO: 7.11 K/UL (ref 3.9–12.7)
WBC #/AREA URNS HPF: 11 /HPF (ref 0–5)

## 2025-01-30 PROCEDURE — 1160F RVW MEDS BY RX/DR IN RCRD: CPT | Mod: CPTII,,, | Performed by: NURSE PRACTITIONER

## 2025-01-30 PROCEDURE — 80061 LIPID PANEL: CPT | Performed by: NURSE PRACTITIONER

## 2025-01-30 PROCEDURE — 99999PBSHW PR PBB SHADOW TECHNICAL ONLY FILED TO HB: Mod: PBBFAC,,,

## 2025-01-30 PROCEDURE — 80053 COMPREHEN METABOLIC PANEL: CPT | Performed by: NURSE PRACTITIONER

## 2025-01-30 PROCEDURE — 3008F BODY MASS INDEX DOCD: CPT | Mod: CPTII,,, | Performed by: NURSE PRACTITIONER

## 2025-01-30 PROCEDURE — 1159F MED LIST DOCD IN RCRD: CPT | Mod: CPTII,,, | Performed by: NURSE PRACTITIONER

## 2025-01-30 PROCEDURE — 3072F LOW RISK FOR RETINOPATHY: CPT | Mod: CPTII,,, | Performed by: NURSE PRACTITIONER

## 2025-01-30 PROCEDURE — 4010F ACE/ARB THERAPY RXD/TAKEN: CPT | Mod: CPTII,,, | Performed by: NURSE PRACTITIONER

## 2025-01-30 PROCEDURE — 83036 HEMOGLOBIN GLYCOSYLATED A1C: CPT | Performed by: NURSE PRACTITIONER

## 2025-01-30 PROCEDURE — 87389 HIV-1 AG W/HIV-1&-2 AB AG IA: CPT | Performed by: NURSE PRACTITIONER

## 2025-01-30 PROCEDURE — 99999 PR PBB SHADOW E&M-EST. PATIENT-LVL V: CPT | Mod: PBBFAC,,, | Performed by: NURSE PRACTITIONER

## 2025-01-30 PROCEDURE — 36415 COLL VENOUS BLD VENIPUNCTURE: CPT | Performed by: NURSE PRACTITIONER

## 2025-01-30 PROCEDURE — 36415 COLL VENOUS BLD VENIPUNCTURE: CPT | Mod: PBBFAC

## 2025-01-30 PROCEDURE — 81000 URINALYSIS NONAUTO W/SCOPE: CPT | Performed by: NURSE PRACTITIONER

## 2025-01-30 PROCEDURE — 82306 VITAMIN D 25 HYDROXY: CPT | Performed by: NURSE PRACTITIONER

## 2025-01-30 PROCEDURE — 82570 ASSAY OF URINE CREATININE: CPT | Performed by: NURSE PRACTITIONER

## 2025-01-30 PROCEDURE — 85025 COMPLETE CBC W/AUTO DIFF WBC: CPT | Performed by: NURSE PRACTITIONER

## 2025-01-30 PROCEDURE — 99215 OFFICE O/P EST HI 40 MIN: CPT | Mod: PBBFAC | Performed by: NURSE PRACTITIONER

## 2025-01-30 PROCEDURE — 99215 OFFICE O/P EST HI 40 MIN: CPT | Mod: S$PBB,,, | Performed by: NURSE PRACTITIONER

## 2025-01-30 PROCEDURE — 3074F SYST BP LT 130 MM HG: CPT | Mod: CPTII,,, | Performed by: NURSE PRACTITIONER

## 2025-01-30 PROCEDURE — 84443 ASSAY THYROID STIM HORMONE: CPT | Performed by: NURSE PRACTITIONER

## 2025-01-30 PROCEDURE — 3078F DIAST BP <80 MM HG: CPT | Mod: CPTII,,, | Performed by: NURSE PRACTITIONER

## 2025-01-30 PROCEDURE — 87086 URINE CULTURE/COLONY COUNT: CPT | Performed by: NURSE PRACTITIONER

## 2025-01-30 RX ORDER — GUANFACINE 1 MG/1
1 TABLET, EXTENDED RELEASE ORAL
COMMUNITY
Start: 2025-01-03

## 2025-01-30 RX ORDER — RISANKIZUMAB-RZAA 180 MG/1.2
KIT SUBCUTANEOUS
COMMUNITY
Start: 2025-01-07

## 2025-01-30 NOTE — PROGRESS NOTES
[facility] Note    HPI:  Sadaf Dhaliwal is a 44 y.o. female who presents today for Health Maintenance (Check up)     Started Skyrizi in October because Humira stopped working after 4 years, abd problems and liver problems started after starting Skirizi    Review of Systems   Constitutional: Negative.    HENT: Negative.     Eyes: Negative.    Respiratory: Negative.     Cardiovascular: Negative.    Gastrointestinal:  Positive for abdominal pain, diarrhea, heartburn, nausea and vomiting.   Genitourinary:  Positive for frequency.   Musculoskeletal: Negative.    Skin: Negative.    Neurological: Negative.    Endo/Heme/Allergies: Negative.    Psychiatric/Behavioral: Negative.        A review of systems was performed and was negative except as noted above.    I personally reviewed allergies, past medical, surgical, social and family history and updated as appropriate.    Medications:    Current Outpatient Medications:     amitriptyline (ELAVIL) 100 MG tablet, Take 100 mg by mouth every evening., Disp: , Rfl:     dicyclomine (BENTYL) 10 MG capsule, Take 1 capsule (10 mg total) by mouth 3 (three) times daily as needed (abdominal pain)., Disp: 21 capsule, Rfl: 0    guanFACINE 1 mg Tb24, Take 1 tablet by mouth., Disp: , Rfl:     hydroCHLOROthiazide (HYDRODIURIL) 12.5 MG Tab, TAKE ONE TABLET BY MOUTH ONCE A DAY **THANK YOU**, Disp: 30 tablet, Rfl: 5    levocetirizine (XYZAL) 5 MG tablet, TAKE ONE TABLET BY MOUTH EVERY EVENING **THANK YOU**, Disp: 30 tablet, Rfl: 5    levothyroxine (SYNTHROID) 112 MCG tablet, Take 1 tablet (112 mcg total) by mouth before breakfast., Disp: 30 tablet, Rfl: 11    losartan (COZAAR) 100 MG tablet, Take 1 tablet (100 mg total) by mouth once daily. **THANK YOU**, Disp: 30 tablet, Rfl: 11    meloxicam (MOBIC) 15 MG tablet, Take 1 tablet (15 mg total) by mouth once daily., Disp: 30 tablet, Rfl: 0    metFORMIN (GLUCOPHAGE-XR) 500 MG ER 24hr tablet, Take 1 tablet (500 mg total) by  mouth 2 (two) times daily with meals., Disp: 60 tablet, Rfl: 0    ondansetron (ZOFRAN-ODT) 4 MG TbDL, Take 1 tablet (4 mg total) by mouth every 6 (six) hours as needed., Disp: 10 tablet, Rfl: 0    rosuvastatin (CRESTOR) 40 MG Tab, TAKE ONE TABLET BY MOUTH ONCE A DAY **THANK YOU**, Disp: 30 tablet, Rfl: 5    SKYRIZI 180 mg/1.2 mL (150 mg/mL) Injt, , Disp: , Rfl:     UBRELVY 100 mg tablet, Take by mouth., Disp: , Rfl:     valACYclovir (VALTREX) 500 MG tablet, Take 500 mg by mouth 2 (two) times daily., Disp: , Rfl:   No current facility-administered medications for this visit.    Facility-Administered Medications Ordered in Other Visits:     mupirocin 2 % ointment, , Nasal, On Call Procedure, Georgina Lin MD, Given at 05/23/24 0535     Health Maintenance:  Immunization History   Administered Date(s) Administered    Influenza 01/01/2009    Tdap 07/01/2013, 03/02/2023      Health Maintenance   Topic Date Due    Diabetes Urine Screening  Never done    Foot Exam  Never done    HIV Screening  Never done    Lipid Panel  05/24/2024    Hemoglobin A1c  10/25/2024    COVID-19 Vaccine (1 - 2024-25 season) 01/30/2026 (Originally 9/1/2024)    Pneumococcal Vaccines (Age 0-49) (1 of 2 - PCV) 01/30/2026 (Originally 8/16/1998)    Mammogram  07/22/2025    Diabetic Eye Exam  10/08/2025    Low Dose Statin  01/30/2026    Cervical Cancer Screening  05/08/2028    TETANUS VACCINE  03/02/2033    Colorectal Cancer Screening  11/26/2034    RSV Vaccine (Age 60+ and Pregnant patients) (1 - 1-dose 75+ series) 08/16/2054    Hepatitis C Screening  Completed    Influenza Vaccine  Discontinued     Health Maintenance Topics with due status: Not Due       Topic Last Completion Date    TETANUS VACCINE 03/02/2023    Cervical Cancer Screening 05/08/2023    Mammogram 07/22/2024    Diabetic Eye Exam 10/08/2024    Colorectal Cancer Screening 11/26/2024    Low Dose Statin 01/30/2025    RSV Vaccine (Age 60+ and Pregnant patients) Not Due     Health Maintenance  Due   Topic Date Due    Diabetes Urine Screening  Never done    Foot Exam  Never done    HIV Screening  Never done    Lipid Panel  05/24/2024    Hemoglobin A1c  10/25/2024       PHYSICAL EXAM:  Vitals:    01/30/25 1316   BP: (!) 102/57   Patient Position: Sitting   Pulse: 77   Temp: 97.7 °F (36.5 °C)   SpO2: 95%   Weight: 86.6 kg (191 lb)   Height: 5' (1.524 m)     Body mass index is 37.3 kg/m².  Physical Exam  Constitutional:       Appearance: Normal appearance. She is normal weight.   HENT:      Head: Normocephalic.      Right Ear: Tympanic membrane, ear canal and external ear normal.      Left Ear: Tympanic membrane, ear canal and external ear normal.      Nose: Nose normal.      Mouth/Throat:      Mouth: Mucous membranes are moist.   Cardiovascular:      Rate and Rhythm: Normal rate and regular rhythm.      Pulses: Normal pulses.      Heart sounds: Normal heart sounds.   Pulmonary:      Effort: Pulmonary effort is normal.      Breath sounds: Normal breath sounds.   Musculoskeletal:         General: Normal range of motion.      Cervical back: Normal range of motion.   Skin:     General: Skin is warm and dry.   Neurological:      General: No focal deficit present.      Mental Status: She is alert and oriented to person, place, and time.          ASSESSMENT/PLAN:  1. Encounter to establish care    2. Epistaxis  -     Ambulatory referral/consult to ENT; Future; Expected date: 01/30/2025    3. Neuropathy    4. Autism    5. Bipolar affective disorder, remission status unspecified    6. Heart murmur    7. Arthritis    8. Cigarette nicotine dependence without complication    9. Marijuana use    10. New onset type 2 diabetes mellitus  -     Comprehensive Metabolic Panel; Future; Expected date: 01/30/2025  -     Hemoglobin A1C; Future; Expected date: 01/30/2025  -     Microalbumin/Creatinine Ratio, Urine; Future; Expected date: 01/30/2025    11. Encounter for screening for HIV  -     HIV 1/2 Ag/Ab (4th Gen); Future;  Expected date: 01/30/2025    12. Screening for iron deficiency anemia  -     CBC Auto Differential; Future; Expected date: 01/30/2025    13. Hypothyroidism, unspecified type  -     TSH; Future; Expected date: 01/30/2025    14. Hyperlipidemia, unspecified hyperlipidemia type  -     Lipid Panel; Future; Expected date: 01/30/2025    15. Vitamin D deficiency  -     Misc Sendout Test, Blood Vitamin D; Future; Expected date: 01/30/2025    16. Encounter for diabetic foot exam  -     Ambulatory referral/consult to Podiatry; Future; Expected date: 01/30/2025    17. Generalized abdominal pain  -     Urinalysis, Reflex to Urine Culture Urine, Clean Catch    18. Adrenal adenoma, unspecified laterality  -     Ambulatory referral/consult to Endocrinology; Future; Expected date: 01/30/2025        Other than changes above, continue current medications and maintain follow up with specialists.      Follow up if symptoms worsen or fail to improve.   Recent Results (from the past 12 weeks)   POCT glucose    Collection Time: 11/26/24  9:52 AM   Result Value Ref Range    POCT Glucose 84 70 - 110 mg/dL   CBC auto differential    Collection Time: 01/15/25 12:24 PM   Result Value Ref Range    WBC 8.29 3.90 - 12.70 K/uL    RBC 4.40 4.00 - 5.40 M/uL    Hemoglobin 14.6 12.0 - 16.0 g/dL    Hematocrit 42.8 37.0 - 48.5 %    MCV 97 82 - 98 fL    MCH 33.2 (H) 27.0 - 31.0 pg    MCHC 34.1 32.0 - 36.0 g/dL    RDW 13.0 11.5 - 14.5 %    Platelets 203 150 - 450 K/uL    MPV 8.7 (L) 9.2 - 12.9 fL    Immature Granulocytes 0.1 0.0 - 0.5 %    Gran # (ANC) 4.5 1.8 - 7.7 K/uL    Immature Grans (Abs) 0.01 0.00 - 0.04 K/uL    Lymph # 3.0 1.0 - 4.8 K/uL    Mono # 0.5 0.3 - 1.0 K/uL    Eos # 0.2 0.0 - 0.5 K/uL    Baso # 0.05 0.00 - 0.20 K/uL    nRBC 0 0 /100 WBC    Gran % 54.5 38.0 - 73.0 %    Lymph % 36.4 18.0 - 48.0 %    Mono % 6.5 4.0 - 15.0 %    Eosinophil % 1.9 0.0 - 8.0 %    Basophil % 0.6 0.0 - 1.9 %    Differential Method Automated    Comprehensive  metabolic panel    Collection Time: 01/15/25 12:24 PM   Result Value Ref Range    Sodium 143 136 - 145 mmol/L    Potassium 4.0 3.5 - 5.1 mmol/L    Chloride 104 95 - 110 mmol/L    CO2 29 23 - 29 mmol/L    Glucose 114 (H) 70 - 110 mg/dL    BUN 7 6 - 20 mg/dL    Creatinine 0.9 0.5 - 1.4 mg/dL    Calcium 9.8 8.7 - 10.5 mg/dL    Total Protein 8.0 6.0 - 8.4 g/dL    Albumin 4.3 3.5 - 5.2 g/dL    Total Bilirubin 0.4 0.1 - 1.0 mg/dL    Alkaline Phosphatase 78 55 - 135 U/L    AST 28 10 - 40 U/L    ALT 22 10 - 44 U/L    eGFR >60.0 >60 mL/min/1.73 m^2    Anion Gap 10 8 - 16 mmol/L   Lipase    Collection Time: 01/15/25 12:24 PM   Result Value Ref Range    Lipase 19 4 - 60 U/L   Urinalysis, Reflex to Urine Culture Urine, Clean Catch    Collection Time: 01/15/25 12:46 PM    Specimen: Urine, Clean Catch   Result Value Ref Range    Specimen UA Urine, Clean Catch     Color, UA Yellow Yellow, Straw, Latisha    Appearance, UA Clear Clear    pH, UA 7.0 5.0 - 8.0    Specific Gravity, UA 1.010 1.005 - 1.030    Protein, UA Negative Negative    Glucose, UA Negative Negative    Ketones, UA Negative Negative    Bilirubin (UA) Negative Negative    Occult Blood UA 2+ (A) Negative    Nitrite, UA Negative Negative    Urobilinogen, UA Negative <2.0 EU/dL    Leukocytes, UA Trace (A) Negative   Urinalysis Microscopic    Collection Time: 01/15/25 12:46 PM   Result Value Ref Range    RBC, UA 2 0 - 4 /hpf    WBC, UA 10 (H) 0 - 5 /hpf    Bacteria Occasional None-Occ /hpf    Squam Epithel, UA 8 /hpf    Hyaline Casts, UA 0.7 (A) 0-1/lpf /lpf    Microscopic Comment SEE COMMENT          Kathy Saxena, MONICA  [facility]

## 2025-01-31 DIAGNOSIS — N39.0 URINARY TRACT INFECTION WITHOUT HEMATURIA, SITE UNSPECIFIED: Primary | ICD-10-CM

## 2025-01-31 DIAGNOSIS — N39.0 URINARY TRACT INFECTION WITH HEMATURIA, SITE UNSPECIFIED: ICD-10-CM

## 2025-01-31 DIAGNOSIS — E55.9 VITAMIN D DEFICIENCY: ICD-10-CM

## 2025-01-31 DIAGNOSIS — R31.9 URINARY TRACT INFECTION WITH HEMATURIA, SITE UNSPECIFIED: ICD-10-CM

## 2025-01-31 LAB — 25(OH)D3+25(OH)D2 SERPL-MCNC: 11 NG/ML (ref 30–96)

## 2025-01-31 RX ORDER — NITROFURANTOIN 25; 75 MG/1; MG/1
100 CAPSULE ORAL 2 TIMES DAILY
Qty: 14 CAPSULE | Refills: 0 | Status: SHIPPED | OUTPATIENT
Start: 2025-01-31 | End: 2025-02-07

## 2025-01-31 RX ORDER — ERGOCALCIFEROL 1.25 MG/1
50000 CAPSULE ORAL
Qty: 8 CAPSULE | Refills: 1 | Status: SHIPPED | OUTPATIENT
Start: 2025-02-03 | End: 2025-04-04

## 2025-02-01 LAB
BACTERIA UR CULT: NORMAL
BACTERIA UR CULT: NORMAL

## 2025-02-11 ENCOUNTER — OFFICE VISIT (OUTPATIENT)
Dept: ENDOCRINOLOGY | Facility: CLINIC | Age: 45
End: 2025-02-11
Payer: MEDICAID

## 2025-02-11 VITALS — BODY MASS INDEX: 37.58 KG/M2 | WEIGHT: 192.38 LBS

## 2025-02-11 DIAGNOSIS — E11.9 NEW ONSET TYPE 2 DIABETES MELLITUS: Primary | ICD-10-CM

## 2025-02-11 DIAGNOSIS — D35.00 ADRENAL ADENOMA, UNSPECIFIED LATERALITY: ICD-10-CM

## 2025-02-11 DIAGNOSIS — E87.6 HYPOKALEMIA: ICD-10-CM

## 2025-02-11 DIAGNOSIS — E06.3 HYPOTHYROIDISM DUE TO HASHIMOTO THYROIDITIS: ICD-10-CM

## 2025-02-11 DIAGNOSIS — I10 PRIMARY HYPERTENSION: ICD-10-CM

## 2025-02-11 PROCEDURE — 1159F MED LIST DOCD IN RCRD: CPT | Mod: CPTII,,, | Performed by: STUDENT IN AN ORGANIZED HEALTH CARE EDUCATION/TRAINING PROGRAM

## 2025-02-11 PROCEDURE — 99213 OFFICE O/P EST LOW 20 MIN: CPT | Mod: PBBFAC | Performed by: STUDENT IN AN ORGANIZED HEALTH CARE EDUCATION/TRAINING PROGRAM

## 2025-02-11 PROCEDURE — 1160F RVW MEDS BY RX/DR IN RCRD: CPT | Mod: CPTII,,, | Performed by: STUDENT IN AN ORGANIZED HEALTH CARE EDUCATION/TRAINING PROGRAM

## 2025-02-11 PROCEDURE — 99204 OFFICE O/P NEW MOD 45 MIN: CPT | Mod: S$PBB,,, | Performed by: STUDENT IN AN ORGANIZED HEALTH CARE EDUCATION/TRAINING PROGRAM

## 2025-02-11 PROCEDURE — 3044F HG A1C LEVEL LT 7.0%: CPT | Mod: CPTII,,, | Performed by: STUDENT IN AN ORGANIZED HEALTH CARE EDUCATION/TRAINING PROGRAM

## 2025-02-11 PROCEDURE — 3072F LOW RISK FOR RETINOPATHY: CPT | Mod: CPTII,,, | Performed by: STUDENT IN AN ORGANIZED HEALTH CARE EDUCATION/TRAINING PROGRAM

## 2025-02-11 PROCEDURE — 4010F ACE/ARB THERAPY RXD/TAKEN: CPT | Mod: CPTII,,, | Performed by: STUDENT IN AN ORGANIZED HEALTH CARE EDUCATION/TRAINING PROGRAM

## 2025-02-11 PROCEDURE — 3008F BODY MASS INDEX DOCD: CPT | Mod: CPTII,,, | Performed by: STUDENT IN AN ORGANIZED HEALTH CARE EDUCATION/TRAINING PROGRAM

## 2025-02-11 PROCEDURE — 3066F NEPHROPATHY DOC TX: CPT | Mod: CPTII,,, | Performed by: STUDENT IN AN ORGANIZED HEALTH CARE EDUCATION/TRAINING PROGRAM

## 2025-02-11 PROCEDURE — 3061F NEG MICROALBUMINURIA REV: CPT | Mod: CPTII,,, | Performed by: STUDENT IN AN ORGANIZED HEALTH CARE EDUCATION/TRAINING PROGRAM

## 2025-02-11 PROCEDURE — 99999 PR PBB SHADOW E&M-EST. PATIENT-LVL III: CPT | Mod: PBBFAC,,, | Performed by: STUDENT IN AN ORGANIZED HEALTH CARE EDUCATION/TRAINING PROGRAM

## 2025-02-11 NOTE — PROGRESS NOTES
Subjective:      Patient ID: Sadaf Dhaliwal is a 44 y.o. female.    Chief Complaint:  Adrenal adenomas    History of Present Illness  This is a 44 y.o. female. with a past medical history of DM2, MAFLD, HTN, HLD here for evaluation of bilateral adrenal adenomas.        Bilateral adrenal adenomas  CT from Jan 2025 with bilateral adrenal adenomas not well characterized given type of CT  Noted adrenals similar in appearance when compared to another CT in 2020  No biochemical work up  She does have BMI 37, HTN, HLD          Type 2 diabetes mellitus     Latest Reference Range & Units 04/25/24 15:21   Hemoglobin A1C External 4.0 - 5.6 % 7.3 (H)       Current diabetes medications:  - Metformin 500 mg BID      Lab Results   Component Value Date    CREATININE 0.9 01/30/2025    EGFRNORACEVR >60.0 01/30/2025         Screening or Prevention Patient's value   HgA1C Testing and Control   Lab Results   Component Value Date    HGBA1C 5.7 (H) 01/30/2025        LDL control Lab Results   Component Value Date    LDLCALC 79.2 01/30/2025      Nephropathy screening Lab Results   Component Value Date    MICALBCREAT Unable to calculate 01/30/2025        Lab Results   Component Value Date    TSH 2.274 01/30/2025       Last eye exam: : 10/08/2024      Hypothyroidism  Currently on levothyroxine 112 mcg daily    Lab Results   Component Value Date    TSH 2.274 01/30/2025         Review of Systems  As above    Social and family history reviewed  Current medications and allergies reviewed    Objective:   Wt 87.3 kg (192 lb 6.4 oz)   LMP 01/05/2022   BMI 37.58 kg/m²   Physical Exam  Alert, oriented    BP Readings from Last 1 Encounters:   01/30/25 (!) 102/57      Wt Readings from Last 1 Encounters:   02/11/25 1518 87.3 kg (192 lb 6.4 oz)     Body mass index is 37.58 kg/m².    Lab Review:   Lab Results   Component Value Date    HGBA1C 5.7 (H) 01/30/2025     Lab Results   Component Value Date    CHOL 156 01/30/2025    HDL 51 01/30/2025     LDLCALC 79.2 01/30/2025    TRIG 129 01/30/2025    CHOLHDL 32.7 01/30/2025     Lab Results   Component Value Date     01/30/2025    K 3.9 01/30/2025     01/30/2025    CO2 24 01/30/2025    GLU 84 01/30/2025    BUN 11 01/30/2025    CREATININE 0.9 01/30/2025    CALCIUM 10.0 01/30/2025    PROT 7.7 01/30/2025    ALBUMIN 4.2 01/30/2025    BILITOT 0.2 01/30/2025    ALKPHOS 93 01/30/2025    AST 22 01/30/2025    ALT 14 01/30/2025    ANIONGAP 10 01/30/2025    ESTGFRAFRICA >60.0 08/25/2021    EGFRNONAA >60.0 08/25/2021    TSH 2.274 01/30/2025       All pertinent labs reviewed    Assessment and Plan     Adrenal adenoma  She has bilateral adrenal enlargement   No clear adenomas are defined giving CT scan is not with adrenal protocol  It is quite reassuring that appearance of adrenals is similar compared to 2020  Would prefer to have at least 1 CT adrenal protocol to evaluate for size, density, contrast washout  She has a history of an allergy to iodinated contrast, so we will ask Radiology about their protocol for this   We will start biochemical workup    New onset type 2 diabetes mellitus  Controlled on metformin   A1c at goal   Rule out hypercortisolism    Hypothyroidism  Last TSH at goal for age  Continue levothyroxine 112 mcg daily    Hypokalemia  Rule out hyper aldosteronism as above    Primary hypertension  Rule out secondary causes from adrenal origin as above        Yosvany Mckee MD  Endocrinology

## 2025-02-11 NOTE — ASSESSMENT & PLAN NOTE
She has bilateral adrenal enlargement   No clear adenomas are defined giving CT scan is not with adrenal protocol  It is quite reassuring that appearance of adrenals is similar compared to 2020  Would prefer to have at least 1 CT adrenal protocol to evaluate for size, density, contrast washout  She has a history of an allergy to iodinated contrast, so we will ask Radiology about their protocol for this   We will start biochemical workup

## 2025-02-18 DIAGNOSIS — E11.9 NEW ONSET TYPE 2 DIABETES MELLITUS: ICD-10-CM

## 2025-02-18 RX ORDER — METFORMIN HYDROCHLORIDE 500 MG/1
500 TABLET, EXTENDED RELEASE ORAL 2 TIMES DAILY WITH MEALS
Qty: 60 TABLET | Refills: 0 | Status: SHIPPED | OUTPATIENT
Start: 2025-02-18

## 2025-03-03 DIAGNOSIS — I10 PRIMARY HYPERTENSION: ICD-10-CM

## 2025-03-03 RX ORDER — HYDROCHLOROTHIAZIDE 12.5 MG/1
12.5 TABLET ORAL DAILY
Qty: 30 TABLET | Refills: 5 | Status: SHIPPED | OUTPATIENT
Start: 2025-03-03

## 2025-03-05 DIAGNOSIS — I10 PRIMARY HYPERTENSION: ICD-10-CM

## 2025-03-05 RX ORDER — HYDROCHLOROTHIAZIDE 12.5 MG/1
12.5 TABLET ORAL DAILY
Qty: 30 TABLET | Refills: 5 | OUTPATIENT
Start: 2025-03-05

## 2025-06-30 DIAGNOSIS — E78.5 HYPERLIPIDEMIA, UNSPECIFIED HYPERLIPIDEMIA TYPE: ICD-10-CM

## 2025-06-30 RX ORDER — ROSUVASTATIN CALCIUM 40 MG/1
40 TABLET, COATED ORAL DAILY
Qty: 90 TABLET | Refills: 3 | Status: SHIPPED | OUTPATIENT
Start: 2025-06-30